# Patient Record
Sex: FEMALE | Race: WHITE | NOT HISPANIC OR LATINO | Employment: OTHER | URBAN - METROPOLITAN AREA
[De-identification: names, ages, dates, MRNs, and addresses within clinical notes are randomized per-mention and may not be internally consistent; named-entity substitution may affect disease eponyms.]

---

## 2019-05-15 ENCOUNTER — HISTORICAL (OUTPATIENT)
Dept: ADMINISTRATIVE | Facility: HOSPITAL | Age: 64
End: 2019-05-15

## 2019-05-15 LAB
ABS NEUT (OLG): 7.54 X10(3)/MCL (ref 2.1–9.2)
ALBUMIN SERPL-MCNC: 3.1 GM/DL (ref 3.4–5)
ALBUMIN/GLOB SERPL: 0.9 RATIO (ref 1.1–2)
ALP SERPL-CCNC: 129 UNIT/L (ref 45–117)
ALT SERPL-CCNC: 19 UNIT/L (ref 12–78)
APPEARANCE, UA: CLEAR
AST SERPL-CCNC: 20 UNIT/L (ref 15–37)
BACTERIA #/AREA URNS AUTO: ABNORMAL /[HPF]
BASOPHILS # BLD AUTO: 0.11 X10(3)/MCL
BASOPHILS NFR BLD AUTO: 1 %
BILIRUB SERPL-MCNC: 0.4 MG/DL (ref 0.2–1)
BILIRUB UR QL STRIP: NEGATIVE
BILIRUBIN DIRECT+TOT PNL SERPL-MCNC: 0.1 MG/DL
BILIRUBIN DIRECT+TOT PNL SERPL-MCNC: 0.3 MG/DL
BUN SERPL-MCNC: 19 MG/DL (ref 7–18)
CALCIUM SERPL-MCNC: 8.9 MG/DL (ref 8.5–10.1)
CHLORIDE SERPL-SCNC: 109 MMOL/L (ref 98–107)
CHOLEST SERPL-MCNC: 148 MG/DL
CHOLEST/HDLC SERPL: 2.6 {RATIO} (ref 0–4.4)
CO2 SERPL-SCNC: 25 MMOL/L (ref 21–32)
COLOR UR: YELLOW
CREAT SERPL-MCNC: 0.8 MG/DL (ref 0.6–1.3)
DEPRECATED CALCIDIOL+CALCIFEROL SERPL-MC: 19.59 NG/ML (ref 30–80)
EOSINOPHIL # BLD AUTO: 0.14 10*3/UL
EOSINOPHIL NFR BLD AUTO: 1 %
ERYTHROCYTE [DISTWIDTH] IN BLOOD BY AUTOMATED COUNT: 13 % (ref 11.5–14.5)
EST. AVERAGE GLUCOSE BLD GHB EST-MCNC: 114 MG/DL
GLOBULIN SER-MCNC: 3.6 GM/ML (ref 2.3–3.5)
GLUCOSE (UA): NORMAL
GLUCOSE SERPL-MCNC: 123 MG/DL (ref 74–106)
HAV IGM SERPL QL IA: NONREACTIVE
HBA1C MFR BLD: 5.6 % (ref 4.2–6.3)
HBV CORE IGM SERPL QL IA: NONREACTIVE
HBV SURFACE AG SERPL QL IA: NEGATIVE
HCT VFR BLD AUTO: 46 % (ref 35–46)
HCV AB SERPL QL IA: NONREACTIVE
HDLC SERPL-MCNC: 58 MG/DL
HGB BLD-MCNC: 14.5 GM/DL (ref 12–16)
HGB UR QL STRIP: NEGATIVE
HIV 1+2 AB+HIV1 P24 AG SERPL QL IA: NONREACTIVE
HYALINE CASTS #/AREA URNS LPF: ABNORMAL /[LPF]
IMM GRANULOCYTES # BLD AUTO: 0.04 10*3/UL
IMM GRANULOCYTES NFR BLD AUTO: 0 %
KETONES UR QL STRIP: NEGATIVE
LDLC SERPL CALC-MCNC: 54 MG/DL (ref 0–130)
LEUKOCYTE ESTERASE UR QL STRIP: NEGATIVE
LYMPHOCYTES # BLD AUTO: 2.56 X10(3)/MCL
LYMPHOCYTES NFR BLD AUTO: 23 % (ref 13–40)
MCH RBC QN AUTO: 29.6 PG (ref 26–34)
MCHC RBC AUTO-ENTMCNC: 31.5 GM/DL (ref 31–37)
MCV RBC AUTO: 93.9 FL (ref 80–100)
MONOCYTES # BLD AUTO: 0.57 X10(3)/MCL
MONOCYTES NFR BLD AUTO: 5 % (ref 4–12)
NEUTROPHILS # BLD AUTO: 7.54 X10(3)/MCL
NEUTROPHILS NFR BLD AUTO: 69 X10(3)/MCL
NITRITE UR QL STRIP: ABNORMAL
PH UR STRIP: 5.5 [PH] (ref 4.5–8)
PLATELET # BLD AUTO: 304 X10(3)/MCL (ref 130–400)
PMV BLD AUTO: 11.5 FL (ref 7.4–10.4)
POTASSIUM SERPL-SCNC: 3.8 MMOL/L (ref 3.5–5.1)
PROT SERPL-MCNC: 6.7 GM/DL (ref 6.4–8.2)
PROT UR QL STRIP: 10 MG/DL
RBC # BLD AUTO: 4.9 X10(6)/MCL (ref 4–5.2)
RBC #/AREA URNS AUTO: ABNORMAL /[HPF]
SODIUM SERPL-SCNC: 140 MMOL/L (ref 136–145)
SP GR UR STRIP: 1.03 (ref 1–1.03)
SQUAMOUS #/AREA URNS LPF: ABNORMAL /[LPF]
TRIGL SERPL-MCNC: 182 MG/DL
TSH SERPL-ACNC: 1.32 MIU/L (ref 0.36–3.74)
UROBILINOGEN UR STRIP-ACNC: NORMAL
VLDLC SERPL CALC-MCNC: 36 MG/DL
WBC # SPEC AUTO: 11 X10(3)/MCL (ref 4.5–11)
WBC #/AREA URNS AUTO: ABNORMAL /HPF

## 2019-05-17 ENCOUNTER — HISTORICAL (OUTPATIENT)
Dept: INTERNAL MEDICINE | Facility: CLINIC | Age: 64
End: 2019-05-17

## 2019-05-23 ENCOUNTER — HISTORICAL (OUTPATIENT)
Dept: RADIOLOGY | Facility: HOSPITAL | Age: 64
End: 2019-05-23

## 2019-05-28 ENCOUNTER — HISTORICAL (OUTPATIENT)
Dept: INTERNAL MEDICINE | Facility: CLINIC | Age: 64
End: 2019-05-28

## 2019-06-03 ENCOUNTER — HISTORICAL (OUTPATIENT)
Dept: ADMINISTRATIVE | Facility: HOSPITAL | Age: 64
End: 2019-06-03

## 2019-06-03 LAB
APPEARANCE, UA: CLEAR
BACTERIA #/AREA URNS AUTO: ABNORMAL /[HPF]
BILIRUB UR QL STRIP: 0.5 MG/DL
COLOR UR: YELLOW
GLUCOSE (UA): NORMAL
HGB UR QL STRIP: NEGATIVE
HYALINE CASTS #/AREA URNS LPF: ABNORMAL /[LPF]
KETONES UR QL STRIP: ABNORMAL
LEUKOCYTE ESTERASE UR QL STRIP: NEGATIVE
NITRITE UR QL STRIP: NEGATIVE
PH UR STRIP: 5.5 [PH] (ref 4.5–8)
PROT UR QL STRIP: 20 MG/DL
RBC #/AREA URNS AUTO: ABNORMAL /[HPF]
SP GR UR STRIP: 1.03 (ref 1–1.03)
SQUAMOUS #/AREA URNS LPF: ABNORMAL /[LPF]
UROBILINOGEN UR STRIP-ACNC: 3 MG/DL
WBC #/AREA URNS AUTO: ABNORMAL /HPF

## 2019-06-05 LAB — FINAL CULTURE: NORMAL

## 2019-06-11 ENCOUNTER — HISTORICAL (OUTPATIENT)
Dept: RESPIRATORY THERAPY | Facility: HOSPITAL | Age: 64
End: 2019-06-11

## 2019-06-20 ENCOUNTER — HISTORICAL (OUTPATIENT)
Dept: RADIOLOGY | Facility: HOSPITAL | Age: 64
End: 2019-06-20

## 2019-06-26 ENCOUNTER — HISTORICAL (OUTPATIENT)
Dept: INTERNAL MEDICINE | Facility: CLINIC | Age: 64
End: 2019-06-26

## 2019-06-26 LAB — CANCER AG19-9 SERPL-ACNC: 18.9 IU/ML (ref 0–35)

## 2019-09-23 ENCOUNTER — HISTORICAL (OUTPATIENT)
Dept: ADMINISTRATIVE | Facility: HOSPITAL | Age: 64
End: 2019-09-23

## 2019-09-23 LAB
APPEARANCE, UA: CLEAR
BACTERIA #/AREA URNS AUTO: ABNORMAL /[HPF]
BILIRUB UR QL STRIP: NEGATIVE
COLOR UR: YELLOW
GLUCOSE (UA): NORMAL
HGB UR QL STRIP: NEGATIVE
HYALINE CASTS #/AREA URNS LPF: ABNORMAL /[LPF]
KETONES UR QL STRIP: NEGATIVE
LEUKOCYTE ESTERASE UR QL STRIP: 75 LEU/UL
NITRITE UR QL STRIP: ABNORMAL
PH UR STRIP: 5.5 [PH] (ref 4.5–8)
PROT UR QL STRIP: NEGATIVE
RBC #/AREA URNS AUTO: ABNORMAL /[HPF]
SP GR UR STRIP: 1.02 (ref 1–1.03)
SQUAMOUS #/AREA URNS LPF: ABNORMAL /[LPF]
UROBILINOGEN UR STRIP-ACNC: NORMAL
WBC #/AREA URNS AUTO: ABNORMAL /HPF

## 2019-10-07 ENCOUNTER — HISTORICAL (OUTPATIENT)
Dept: ENDOSCOPY | Facility: HOSPITAL | Age: 64
End: 2019-10-07

## 2019-11-25 ENCOUNTER — HISTORICAL (OUTPATIENT)
Dept: ADMINISTRATIVE | Facility: HOSPITAL | Age: 64
End: 2019-11-25

## 2019-11-25 LAB
ABS NEUT (OLG): 7.32 X10(3)/MCL (ref 2.1–9.2)
ALBUMIN SERPL-MCNC: 3.2 GM/DL (ref 3.4–5)
ALBUMIN/GLOB SERPL: 0.9 RATIO (ref 1.1–2)
ALP SERPL-CCNC: 109 UNIT/L (ref 45–117)
ALT SERPL-CCNC: 16 UNIT/L (ref 12–78)
APPEARANCE, UA: CLEAR
AST SERPL-CCNC: 14 UNIT/L (ref 15–37)
BACTERIA #/AREA URNS AUTO: ABNORMAL /HPF
BASOPHILS # BLD AUTO: 0.2 X10(3)/MCL (ref 0–0.2)
BASOPHILS NFR BLD AUTO: 1 %
BILIRUB SERPL-MCNC: 0.2 MG/DL (ref 0.2–1)
BILIRUB UR QL STRIP: NEGATIVE
BILIRUBIN DIRECT+TOT PNL SERPL-MCNC: <0.1 MG/DL (ref 0–0.2)
BILIRUBIN DIRECT+TOT PNL SERPL-MCNC: >0.1 MG/DL
BUN SERPL-MCNC: 22 MG/DL (ref 7–18)
CALCIUM SERPL-MCNC: 9.2 MG/DL (ref 8.5–10.1)
CHLORIDE SERPL-SCNC: 114 MMOL/L (ref 98–107)
CO2 SERPL-SCNC: 27 MMOL/L (ref 21–32)
COLOR UR: YELLOW
CREAT SERPL-MCNC: 1 MG/DL (ref 0.6–1.3)
DEPRECATED CALCIDIOL+CALCIFEROL SERPL-MC: 35.22 NG/ML (ref 30–80)
EOSINOPHIL # BLD AUTO: 0.3 X10(3)/MCL (ref 0–0.9)
EOSINOPHIL NFR BLD AUTO: 2 %
ERYTHROCYTE [DISTWIDTH] IN BLOOD BY AUTOMATED COUNT: 16.2 % (ref 11.5–14.5)
GLOBULIN SER-MCNC: 3.6 GM/ML (ref 2.3–3.5)
GLUCOSE (UA): NEGATIVE
GLUCOSE SERPL-MCNC: 82 MG/DL (ref 74–106)
HCT VFR BLD AUTO: 46 % (ref 35–46)
HGB BLD-MCNC: 14.1 GM/DL (ref 12–16)
HGB UR QL STRIP: 0.03 MG/DL
HYALINE CASTS #/AREA URNS LPF: ABNORMAL /LPF
IMM GRANULOCYTES # BLD AUTO: 0.03 10*3/UL
IMM GRANULOCYTES NFR BLD AUTO: 0 %
KETONES UR QL STRIP: NEGATIVE
LEUKOCYTE ESTERASE UR QL STRIP: 500 LEU/UL
LYMPHOCYTES # BLD AUTO: 2.9 X10(3)/MCL (ref 0.6–4.6)
LYMPHOCYTES NFR BLD AUTO: 26 %
MCH RBC QN AUTO: 29.4 PG (ref 26–34)
MCHC RBC AUTO-ENTMCNC: 30.7 GM/DL (ref 31–37)
MCV RBC AUTO: 95.8 FL (ref 80–100)
MONOCYTES # BLD AUTO: 0.7 X10(3)/MCL (ref 0.1–1.3)
MONOCYTES NFR BLD AUTO: 6 %
MUCOUS THREADS URNS QL MICRO: ABNORMAL
NEUTROPHILS # BLD AUTO: 7.32 X10(3)/MCL (ref 2.1–9.2)
NEUTROPHILS NFR BLD AUTO: 64 %
NITRITE UR QL STRIP: NEGATIVE
PH UR STRIP: 5.5 [PH] (ref 4.5–8)
PLATELET # BLD AUTO: 284 X10(3)/MCL (ref 130–400)
PMV BLD AUTO: 11.5 FL (ref 7.4–10.4)
POTASSIUM SERPL-SCNC: 4.3 MMOL/L (ref 3.5–5.1)
PROT SERPL-MCNC: 6.8 GM/DL (ref 6.4–8.2)
PROT UR QL STRIP: 10 MG/DL
RBC # BLD AUTO: 4.8 X10(6)/MCL (ref 4–5.2)
RBC #/AREA URNS AUTO: ABNORMAL /HPF
SODIUM SERPL-SCNC: 146 MMOL/L (ref 136–145)
SP GR UR STRIP: 1.03 (ref 1–1.03)
SQUAMOUS #/AREA URNS LPF: ABNORMAL /LPF
UROBILINOGEN UR STRIP-ACNC: 3 MG/DL
WBC # SPEC AUTO: 11.4 X10(3)/MCL (ref 4.5–11)
WBC #/AREA URNS AUTO: ABNORMAL /HPF

## 2020-05-26 ENCOUNTER — HISTORICAL (OUTPATIENT)
Dept: ADMINISTRATIVE | Facility: HOSPITAL | Age: 65
End: 2020-05-26

## 2020-05-26 LAB
APPEARANCE, UA: CLEAR
BACTERIA #/AREA URNS AUTO: ABNORMAL /HPF
BILIRUB UR QL STRIP: NEGATIVE
COLOR UR: YELLOW
GLUCOSE (UA): NEGATIVE
HGB UR QL STRIP: NEGATIVE
HYALINE CASTS #/AREA URNS LPF: ABNORMAL /LPF
KETONES UR QL STRIP: ABNORMAL
LEUKOCYTE ESTERASE UR QL STRIP: 500 LEU/UL
NITRITE UR QL STRIP: ABNORMAL
PH UR STRIP: 5.5 [PH] (ref 4.5–8)
PROT UR QL STRIP: 30 MG/DL
RBC #/AREA URNS AUTO: ABNORMAL /HPF
SP GR UR STRIP: 1.04 (ref 1–1.03)
SQUAMOUS #/AREA URNS LPF: ABNORMAL /LPF
UROBILINOGEN UR STRIP-ACNC: 3 MG/DL
WBC #/AREA URNS AUTO: ABNORMAL /HPF

## 2020-05-29 ENCOUNTER — HISTORICAL (OUTPATIENT)
Dept: INTERNAL MEDICINE | Facility: CLINIC | Age: 65
End: 2020-05-29

## 2020-05-29 LAB
ABS NEUT (OLG): 9.52 X10(3)/MCL (ref 2.1–9.2)
ALBUMIN SERPL-MCNC: 3.3 GM/DL (ref 3.4–5)
ALBUMIN/GLOB SERPL: 0.8 RATIO (ref 1.1–2)
ALP SERPL-CCNC: 93 UNIT/L (ref 45–117)
ALT SERPL-CCNC: 17 UNIT/L (ref 12–78)
AST SERPL-CCNC: 14 UNIT/L (ref 15–37)
BASOPHILS # BLD AUTO: 0.1 X10(3)/MCL (ref 0–0.2)
BASOPHILS NFR BLD AUTO: 1 %
BILIRUB SERPL-MCNC: 0.2 MG/DL (ref 0.2–1)
BILIRUBIN DIRECT+TOT PNL SERPL-MCNC: <0.1 MG/DL (ref 0–0.2)
BILIRUBIN DIRECT+TOT PNL SERPL-MCNC: ABNORMAL MG/DL
BUN SERPL-MCNC: 17 MG/DL (ref 7–18)
CALCIUM SERPL-MCNC: 9 MG/DL (ref 8.5–10.1)
CHLORIDE SERPL-SCNC: 109 MMOL/L (ref 98–107)
CHOLEST SERPL-MCNC: 176 MG/DL
CHOLEST/HDLC SERPL: 2.6 {RATIO} (ref 0–4.4)
CO2 SERPL-SCNC: 25 MMOL/L (ref 21–32)
CREAT SERPL-MCNC: 1 MG/DL (ref 0.6–1.3)
DEPRECATED CALCIDIOL+CALCIFEROL SERPL-MC: 52.3 NG/ML (ref 30–80)
EOSINOPHIL # BLD AUTO: 0.1 X10(3)/MCL (ref 0–0.9)
EOSINOPHIL NFR BLD AUTO: 1 %
ERYTHROCYTE [DISTWIDTH] IN BLOOD BY AUTOMATED COUNT: 14.7 % (ref 11.5–14.5)
EST. AVERAGE GLUCOSE BLD GHB EST-MCNC: 111 MG/DL
GLOBULIN SER-MCNC: 3.9 GM/ML (ref 2.3–3.5)
GLUCOSE SERPL-MCNC: 86 MG/DL (ref 74–106)
HBA1C MFR BLD: 5.5 % (ref 4.2–6.3)
HCT VFR BLD AUTO: 41.6 % (ref 35–46)
HDLC SERPL-MCNC: 67 MG/DL (ref 40–59)
HGB BLD-MCNC: 12.9 GM/DL (ref 12–16)
IMM GRANULOCYTES # BLD AUTO: 0.05 10*3/UL
IMM GRANULOCYTES NFR BLD AUTO: 0 %
LDLC SERPL CALC-MCNC: 64 MG/DL
LYMPHOCYTES # BLD AUTO: 2.8 X10(3)/MCL (ref 0.6–4.6)
LYMPHOCYTES NFR BLD AUTO: 21 %
MCH RBC QN AUTO: 28 PG (ref 26–34)
MCHC RBC AUTO-ENTMCNC: 31 GM/DL (ref 31–37)
MCV RBC AUTO: 90.4 FL (ref 80–100)
MONOCYTES # BLD AUTO: 0.8 X10(3)/MCL (ref 0.1–1.3)
MONOCYTES NFR BLD AUTO: 6 %
NEUTROPHILS # BLD AUTO: 9.52 X10(3)/MCL (ref 2.1–9.2)
NEUTROPHILS NFR BLD AUTO: 71 %
PLATELET # BLD AUTO: 388 X10(3)/MCL (ref 130–400)
PMV BLD AUTO: 10.4 FL (ref 7.4–10.4)
POTASSIUM SERPL-SCNC: 3.8 MMOL/L (ref 3.5–5.1)
PROT SERPL-MCNC: 7.2 GM/DL (ref 6.4–8.2)
RBC # BLD AUTO: 4.6 X10(6)/MCL (ref 4–5.2)
SODIUM SERPL-SCNC: 141 MMOL/L (ref 136–145)
TRIGL SERPL-MCNC: 224 MG/DL
TSH SERPL-ACNC: 1.87 MIU/L (ref 0.36–3.74)
VLDLC SERPL CALC-MCNC: 45 MG/DL
WBC # SPEC AUTO: 13.4 X10(3)/MCL (ref 4.5–11)

## 2020-06-16 ENCOUNTER — HISTORICAL (OUTPATIENT)
Dept: INFUSION THERAPY | Facility: HOSPITAL | Age: 65
End: 2020-06-16

## 2020-06-30 ENCOUNTER — HISTORICAL (OUTPATIENT)
Dept: LAB | Facility: HOSPITAL | Age: 65
End: 2020-06-30

## 2020-06-30 LAB
APPEARANCE, UA: CLEAR
BACTERIA #/AREA URNS AUTO: ABNORMAL /HPF
BILIRUB UR QL STRIP: NEGATIVE
COLOR UR: YELLOW
GLUCOSE (UA): NEGATIVE
HGB UR QL STRIP: 0.2
HYALINE CASTS #/AREA URNS LPF: ABNORMAL /LPF
KETONES UR QL STRIP: NEGATIVE
LEUKOCYTE ESTERASE UR QL STRIP: 25 LEU/UL
MUCOUS THREADS URNS QL MICRO: ABNORMAL
NITRITE UR QL STRIP: NEGATIVE
PH UR STRIP: 5.5 [PH] (ref 4.5–8)
PROT UR QL STRIP: 20 MG/DL
RBC #/AREA URNS AUTO: ABNORMAL /HPF
SP GR UR STRIP: 1.03 (ref 1–1.03)
SQUAMOUS #/AREA URNS LPF: ABNORMAL /LPF
UROBILINOGEN UR STRIP-ACNC: NORMAL
WBC #/AREA URNS AUTO: ABNORMAL /HPF

## 2020-07-02 LAB — FINAL CULTURE: NO GROWTH

## 2020-09-04 LAB
ABS NEUT (OLG): 6.27 X10(3)/MCL (ref 2.1–9.2)
ALBUMIN SERPL-MCNC: 3.3 GM/DL (ref 3.4–4.8)
ALBUMIN/GLOB SERPL: 1.3 RATIO (ref 1.1–2)
ALP SERPL-CCNC: 73 UNIT/L (ref 40–150)
ALT SERPL-CCNC: 11 UNIT/L (ref 0–55)
AST SERPL-CCNC: 19 UNIT/L (ref 5–34)
BASOPHILS # BLD AUTO: 0.1 X10(3)/MCL (ref 0–0.2)
BASOPHILS NFR BLD AUTO: 1 %
BILIRUB SERPL-MCNC: 0.2 MG/DL
BILIRUBIN DIRECT+TOT PNL SERPL-MCNC: 0.1 MG/DL (ref 0–0.5)
BILIRUBIN DIRECT+TOT PNL SERPL-MCNC: 0.1 MG/DL (ref 0–0.8)
BUN SERPL-MCNC: 12.6 MG/DL (ref 9.8–20.1)
CALCIUM SERPL-MCNC: 8.4 MG/DL (ref 8.4–10.2)
CHLORIDE SERPL-SCNC: 105 MMOL/L (ref 98–107)
CO2 SERPL-SCNC: 23 MMOL/L (ref 23–31)
CREAT SERPL-MCNC: 0.72 MG/DL (ref 0.55–1.02)
EOSINOPHIL # BLD AUTO: 0.1 X10(3)/MCL (ref 0–0.9)
EOSINOPHIL NFR BLD AUTO: 2 %
ERYTHROCYTE [DISTWIDTH] IN BLOOD BY AUTOMATED COUNT: 16.4 % (ref 11.5–17)
GLOBULIN SER-MCNC: 2.6 GM/DL (ref 2.4–3.5)
GLUCOSE SERPL-MCNC: 78 MG/DL (ref 82–115)
HCT VFR BLD AUTO: 40.1 % (ref 37–47)
HGB BLD-MCNC: 11.9 GM/DL (ref 12–16)
INR PPP: 1 (ref 0–1.3)
LYMPHOCYTES # BLD AUTO: 1.7 X10(3)/MCL (ref 0.6–4.6)
LYMPHOCYTES NFR BLD AUTO: 19 %
MCH RBC QN AUTO: 27.4 PG (ref 27–31)
MCHC RBC AUTO-ENTMCNC: 29.7 GM/DL (ref 33–36)
MCV RBC AUTO: 92.4 FL (ref 80–94)
MONOCYTES # BLD AUTO: 0.6 X10(3)/MCL (ref 0.1–1.3)
MONOCYTES NFR BLD AUTO: 7 %
NEUTROPHILS # BLD AUTO: 6.27 X10(3)/MCL (ref 2.1–9.2)
NEUTROPHILS NFR BLD AUTO: 71 %
PLATELET # BLD AUTO: 334 X10(3)/MCL (ref 130–400)
PMV BLD AUTO: 10.6 FL (ref 9.4–12.4)
POTASSIUM SERPL-SCNC: 4.7 MMOL/L (ref 3.5–5.1)
PROT SERPL-MCNC: 5.9 GM/DL (ref 5.8–7.6)
PROTHROMBIN TIME: 12.6 SECOND(S) (ref 11.1–13.7)
RBC # BLD AUTO: 4.34 X10(6)/MCL (ref 4.2–5.4)
SODIUM SERPL-SCNC: 142 MMOL/L (ref 136–145)
WBC # SPEC AUTO: 8.8 X10(3)/MCL (ref 4.5–11.5)

## 2020-09-11 ENCOUNTER — HISTORICAL (OUTPATIENT)
Dept: ADMINISTRATIVE | Facility: HOSPITAL | Age: 65
End: 2020-09-11

## 2020-09-29 ENCOUNTER — HISTORICAL (OUTPATIENT)
Dept: ADMINISTRATIVE | Facility: HOSPITAL | Age: 65
End: 2020-09-29

## 2020-09-29 LAB
FERRITIN SERPL-MCNC: 12.2 NG/ML (ref 8–388)
IRON SATN MFR SERPL: 8.3 % (ref 15–50)
IRON SERPL-MCNC: 34 MCG/DL (ref 50–170)
RET# (OHS): 0.09 X10(6)/MCL (ref 0.02–0.08)
RETICULOCYTE COUNT AUTOMATED (OLG): 1.8 % (ref 0.5–1.5)
TIBC SERPL-MCNC: 409 MCG/DL (ref 250–450)
TRANSFERRIN SERPL-MCNC: 300 MG/DL (ref 200–360)

## 2020-10-05 ENCOUNTER — HISTORICAL (OUTPATIENT)
Dept: RADIOLOGY | Facility: HOSPITAL | Age: 65
End: 2020-10-05

## 2020-10-06 ENCOUNTER — HISTORICAL (OUTPATIENT)
Dept: RADIOLOGY | Facility: HOSPITAL | Age: 65
End: 2020-10-06

## 2020-12-15 ENCOUNTER — HISTORICAL (OUTPATIENT)
Dept: INFUSION THERAPY | Facility: HOSPITAL | Age: 65
End: 2020-12-15

## 2021-04-06 ENCOUNTER — HISTORICAL (OUTPATIENT)
Dept: RADIOLOGY | Facility: HOSPITAL | Age: 66
End: 2021-04-06

## 2021-04-23 ENCOUNTER — HISTORICAL (OUTPATIENT)
Dept: RADIOLOGY | Facility: HOSPITAL | Age: 66
End: 2021-04-23

## 2021-05-03 ENCOUNTER — HISTORICAL (OUTPATIENT)
Dept: ADMINISTRATIVE | Facility: HOSPITAL | Age: 66
End: 2021-05-03

## 2021-05-03 LAB — SARS-COV-2 RNA RESP QL NAA+PROBE: NOT DETECTED

## 2021-05-05 ENCOUNTER — HISTORICAL (OUTPATIENT)
Dept: SURGERY | Facility: HOSPITAL | Age: 66
End: 2021-05-05

## 2021-05-05 LAB
ABS NEUT (OLG): 8.41 X10(3)/MCL (ref 2.1–9.2)
ALBUMIN SERPL-MCNC: 2.9 GM/DL (ref 3.4–4.8)
ALP SERPL-CCNC: 61 UNIT/L (ref 40–150)
ALT SERPL-CCNC: 11 UNIT/L (ref 0–55)
AST SERPL-CCNC: 20 UNIT/L (ref 5–34)
BASOPHILS # BLD AUTO: 0.1 X10(3)/MCL (ref 0–0.2)
BASOPHILS NFR BLD AUTO: 1 %
BILIRUB SERPL-MCNC: 0.2 MG/DL
BILIRUBIN DIRECT+TOT PNL SERPL-MCNC: 0.1 MG/DL (ref 0–0.5)
BILIRUBIN DIRECT+TOT PNL SERPL-MCNC: 0.1 MG/DL (ref 0–0.8)
EOSINOPHIL # BLD AUTO: 0.1 X10(3)/MCL (ref 0–0.9)
EOSINOPHIL NFR BLD AUTO: 1 %
ERYTHROCYTE [DISTWIDTH] IN BLOOD BY AUTOMATED COUNT: 13.9 % (ref 11.5–14.5)
HCT VFR BLD AUTO: 41.6 % (ref 35–46)
HGB BLD-MCNC: 13.3 GM/DL (ref 12–16)
IMM GRANULOCYTES # BLD AUTO: 0.07 10*3/UL
IMM GRANULOCYTES NFR BLD AUTO: 1 %
LYMPHOCYTES # BLD AUTO: 1.4 X10(3)/MCL (ref 0.6–4.6)
LYMPHOCYTES NFR BLD AUTO: 14 %
MCH RBC QN AUTO: 33 PG (ref 26–34)
MCHC RBC AUTO-ENTMCNC: 32 GM/DL (ref 31–37)
MCV RBC AUTO: 103.2 FL (ref 80–100)
MONOCYTES # BLD AUTO: 0.5 X10(3)/MCL (ref 0.1–1.3)
MONOCYTES NFR BLD AUTO: 4 %
NEUTROPHILS # BLD AUTO: 8.41 X10(3)/MCL (ref 2.1–9.2)
NEUTROPHILS NFR BLD AUTO: 80 %
PLATELET # BLD AUTO: 248 X10(3)/MCL (ref 130–400)
PMV BLD AUTO: 9.6 FL (ref 7.4–10.4)
PROT SERPL-MCNC: 5.6 GM/DL (ref 5.8–7.6)
RBC # BLD AUTO: 4.03 X10(6)/MCL (ref 4–5.2)
WBC # SPEC AUTO: 10.6 X10(3)/MCL (ref 4.5–11)

## 2021-06-15 ENCOUNTER — HISTORICAL (OUTPATIENT)
Dept: ADMINISTRATIVE | Facility: HOSPITAL | Age: 66
End: 2021-06-15

## 2021-06-15 ENCOUNTER — HISTORICAL (OUTPATIENT)
Dept: INFUSION THERAPY | Facility: HOSPITAL | Age: 66
End: 2021-06-15

## 2021-06-15 LAB
APPEARANCE, UA: ABNORMAL
BACTERIA #/AREA URNS AUTO: ABNORMAL /HPF
BILIRUB UR QL STRIP: NEGATIVE
COLOR UR: YELLOW
GLUCOSE (UA): NEGATIVE
HGB UR QL STRIP: 0.03 MG/DL
HYALINE CASTS #/AREA URNS LPF: ABNORMAL /LPF
KETONES UR QL STRIP: NEGATIVE
LEUKOCYTE ESTERASE UR QL STRIP: 25 LEU/UL
NITRITE UR QL STRIP: ABNORMAL
PH UR STRIP: 5.5 [PH] (ref 4.5–8)
PROT UR QL STRIP: 30 MG/DL
RBC #/AREA URNS AUTO: ABNORMAL /HPF
SP GR UR STRIP: 1.02 (ref 1–1.03)
SQUAMOUS #/AREA URNS LPF: ABNORMAL /LPF
UROBILINOGEN UR STRIP-ACNC: NORMAL
WBC #/AREA URNS AUTO: ABNORMAL /HPF

## 2021-07-07 ENCOUNTER — HISTORICAL (OUTPATIENT)
Dept: ADMINISTRATIVE | Facility: HOSPITAL | Age: 66
End: 2021-07-07

## 2021-07-07 LAB
ABS NEUT (OLG): 11.75 X10(3)/MCL (ref 2.1–9.2)
ALBUMIN SERPL-MCNC: 2.4 GM/DL (ref 3.4–4.8)
ALBUMIN/GLOB SERPL: 0.7 RATIO (ref 1.1–2)
ALP SERPL-CCNC: 145 UNIT/L (ref 40–150)
ALT SERPL-CCNC: 20 UNIT/L (ref 0–55)
AST SERPL-CCNC: 19 UNIT/L (ref 5–34)
BASOPHILS # BLD AUTO: 0.2 X10(3)/MCL (ref 0–0.2)
BASOPHILS NFR BLD AUTO: 1 %
BILIRUB SERPL-MCNC: 0.1 MG/DL
BILIRUBIN DIRECT+TOT PNL SERPL-MCNC: 0 MG/DL (ref 0–0.8)
BILIRUBIN DIRECT+TOT PNL SERPL-MCNC: 0.1 MG/DL (ref 0–0.5)
BUN SERPL-MCNC: 16.4 MG/DL (ref 9.8–20.1)
CALCIUM SERPL-MCNC: 8.4 MG/DL (ref 8.4–10.2)
CHLORIDE SERPL-SCNC: 113 MMOL/L (ref 98–107)
CO2 SERPL-SCNC: 21 MMOL/L (ref 23–31)
CREAT SERPL-MCNC: 0.86 MG/DL (ref 0.55–1.02)
CREAT UR-MCNC: 81.3 MG/DL (ref 45–106)
EOSINOPHIL # BLD AUTO: 0.1 X10(3)/MCL (ref 0–0.9)
EOSINOPHIL NFR BLD AUTO: 0 %
ERYTHROCYTE [DISTWIDTH] IN BLOOD BY AUTOMATED COUNT: 19 % (ref 11.5–14.5)
ERYTHROCYTE [SEDIMENTATION RATE] IN BLOOD: 3 MM/HR (ref 0–20)
EST. AVERAGE GLUCOSE BLD GHB EST-MCNC: <68.1 MG/DL
GGT SERPL-CCNC: 49 U/L (ref 9–36)
GLOBULIN SER-MCNC: 3.4 GM/DL (ref 2.4–3.5)
GLUCOSE SERPL-MCNC: 83 MG/DL (ref 82–115)
HAPTOGLOB SERPL-MCNC: 208 MG/DL (ref 63–273)
HBA1C MFR BLD: <4 %
HCT VFR BLD AUTO: 37.2 % (ref 35–46)
HGB BLD-MCNC: 11.2 GM/DL (ref 12–16)
IMM GRANULOCYTES # BLD AUTO: 0.08 10*3/UL
IMM GRANULOCYTES NFR BLD AUTO: 0 %
IRON SATN MFR SERPL: 17 % (ref 20–50)
IRON SERPL-MCNC: 29 UG/DL (ref 50–170)
LDH SERPL-CCNC: 273 UNIT/L (ref 140–271)
LYMPHOCYTES # BLD AUTO: 1.8 X10(3)/MCL (ref 0.6–4.6)
LYMPHOCYTES NFR BLD AUTO: 12 %
MCH RBC QN AUTO: 33.9 PG (ref 26–34)
MCHC RBC AUTO-ENTMCNC: 30.1 GM/DL (ref 31–37)
MCV RBC AUTO: 112.7 FL (ref 80–100)
MONOCYTES # BLD AUTO: 0.9 X10(3)/MCL (ref 0.1–1.3)
MONOCYTES NFR BLD AUTO: 6 %
NEUTROPHILS # BLD AUTO: 11.75 X10(3)/MCL (ref 2.1–9.2)
NEUTROPHILS NFR BLD AUTO: 80 %
NRBC BLD AUTO-RTO: 0 % (ref 0–0.2)
PLATELET # BLD AUTO: 355 X10(3)/MCL (ref 130–400)
PMV BLD AUTO: 8.9 FL (ref 7.4–10.4)
POTASSIUM SERPL-SCNC: 4.8 MMOL/L (ref 3.5–5.1)
PROT SERPL-MCNC: 5.8 GM/DL (ref 5.8–7.6)
PROT UR STRIP-MCNC: 19.8 MG/DL
PROT/CREAT UR-RTO: 243.5 MG/GM CR
RBC # BLD AUTO: 3.3 X10(6)/MCL (ref 4–5.2)
SODIUM SERPL-SCNC: 142 MMOL/L (ref 136–145)
TIBC SERPL-MCNC: 145 UG/DL (ref 70–310)
TIBC SERPL-MCNC: 174 UG/DL (ref 250–450)
TRANSFERRIN SERPL-MCNC: 144 MG/DL (ref 173–360)
WBC # SPEC AUTO: 14.8 X10(3)/MCL (ref 4.5–11)

## 2021-07-19 ENCOUNTER — HISTORICAL (OUTPATIENT)
Dept: RADIOLOGY | Facility: HOSPITAL | Age: 66
End: 2021-07-19

## 2021-07-22 ENCOUNTER — HISTORICAL (OUTPATIENT)
Dept: CARDIOLOGY | Facility: HOSPITAL | Age: 66
End: 2021-07-22

## 2021-07-29 ENCOUNTER — HISTORICAL (OUTPATIENT)
Dept: INTERNAL MEDICINE | Facility: CLINIC | Age: 66
End: 2021-07-29

## 2021-07-29 LAB
ABS NEUT (OLG): 11.14 X10(3)/MCL (ref 2.1–9.2)
ALBUMIN % SPEP (OHS): 47.74 % (ref 48.1–59.5)
ALBUMIN SERPL-MCNC: 1.9 GM/DL (ref 3.4–4.8)
ALBUMIN/GLOB SERPL: 0.6 RATIO (ref 1.1–2)
ALPHA 1 GLOB (OHS): 0.29 GM/DL (ref 0–0.4)
ALPHA 1 GLOB% (OHS): 5.71 % (ref 2.3–4.9)
ALPHA 2 GLOB % (OHS): 15.67 % (ref 6.9–13)
ALPHA 2 GLOB (OHS): 0.8 GM/DL (ref 0.4–1)
BASOPHILS # BLD AUTO: 0.2 X10(3)/MCL (ref 0–0.2)
BASOPHILS NFR BLD AUTO: 1 %
BETA GLOB (OHS): 0.73 GM/DL (ref 0.7–1.3)
BETA GLOB% (OHS): 14.24 % (ref 13.8–19.7)
EOSINOPHIL # BLD AUTO: 0.1 X10(3)/MCL (ref 0–0.9)
EOSINOPHIL NFR BLD AUTO: 1 %
ERYTHROCYTE [DISTWIDTH] IN BLOOD BY AUTOMATED COUNT: 15 % (ref 11.5–14.5)
GAMMA GLOBULIN % (OHS): 16.63 % (ref 10.1–21.9)
GAMMA GLOBULIN (OHS): 0.85 GM/DL (ref 0.4–1.8)
GLOBULIN SER-MCNC: 3.2 GM/DL (ref 2.4–3.5)
HCT VFR BLD AUTO: 39.1 % (ref 35–46)
HGB BLD-MCNC: 12.2 GM/DL (ref 12–16)
IFE INTERPRETATION (OHS): ABNORMAL
IGA SERPL-MCNC: 122 MG/DL (ref 69–517)
IGG SERPL-MCNC: 653 MG/DL (ref 552–1632)
IGM SERPL-MCNC: 332 MG/DL (ref 33–293)
IMM GRANULOCYTES # BLD AUTO: 0.09 10*3/UL
IMM GRANULOCYTES NFR BLD AUTO: 1 %
LYMPHOCYTES # BLD AUTO: 2 X10(3)/MCL (ref 0.6–4.6)
LYMPHOCYTES NFR BLD AUTO: 14 %
M SPIKE % (OHS): ABNORMAL
M SPIKE (OHS): ABNORMAL
MCH RBC QN AUTO: 34.1 PG (ref 26–34)
MCHC RBC AUTO-ENTMCNC: 31.2 GM/DL (ref 31–37)
MCV RBC AUTO: 109.2 FL (ref 80–100)
MONOCYTES # BLD AUTO: 0.8 X10(3)/MCL (ref 0.1–1.3)
MONOCYTES NFR BLD AUTO: 6 %
NEUTROPHILS # BLD AUTO: 11.14 X10(3)/MCL (ref 2.1–9.2)
NEUTROPHILS NFR BLD AUTO: 78 %
NRBC BLD AUTO-RTO: 0 % (ref 0–0.2)
PEP GRAPH (OHS): ABNORMAL
PLATELET # BLD AUTO: 378 X10(3)/MCL (ref 130–400)
PMV BLD AUTO: 9.1 FL (ref 7.4–10.4)
PROT SERPL-MCNC: 5.1 GM/DL (ref 5.8–7.6)
RBC # BLD AUTO: 3.58 X10(6)/MCL (ref 4–5.2)
WBC # SPEC AUTO: 14.2 X10(3)/MCL (ref 4.5–11)

## 2021-08-11 ENCOUNTER — HISTORICAL (OUTPATIENT)
Dept: RADIOLOGY | Facility: HOSPITAL | Age: 66
End: 2021-08-11

## 2021-08-20 ENCOUNTER — HISTORICAL (OUTPATIENT)
Dept: ADMINISTRATIVE | Facility: HOSPITAL | Age: 66
End: 2021-08-20

## 2021-08-20 LAB
ABS NEUT (OLG): 9.62 X10(3)/MCL (ref 2.1–9.2)
ALBUMIN SERPL-MCNC: 2.5 GM/DL (ref 3.4–4.8)
ALBUMIN/GLOB SERPL: 0.7 RATIO (ref 1.1–2)
ALP SERPL-CCNC: 103 UNIT/L (ref 40–150)
ALT SERPL-CCNC: 11 UNIT/L (ref 0–55)
AST SERPL-CCNC: 19 UNIT/L (ref 5–34)
BASOPHILS # BLD AUTO: 0.1 X10(3)/MCL (ref 0–0.2)
BASOPHILS NFR BLD AUTO: 1 %
BILIRUB SERPL-MCNC: 0.1 MG/DL
BILIRUBIN DIRECT+TOT PNL SERPL-MCNC: 0 MG/DL (ref 0–0.8)
BILIRUBIN DIRECT+TOT PNL SERPL-MCNC: 0.1 MG/DL (ref 0–0.5)
BUN SERPL-MCNC: 16.5 MG/DL (ref 9.8–20.1)
CALCIUM SERPL-MCNC: 8.4 MG/DL (ref 8.4–10.2)
CHLORIDE SERPL-SCNC: 109 MMOL/L (ref 98–107)
CO2 SERPL-SCNC: 23 MMOL/L (ref 23–31)
CREAT SERPL-MCNC: 0.77 MG/DL (ref 0.55–1.02)
CRP SERPL-MCNC: 0.44 MG/DL
EOSINOPHIL # BLD AUTO: 0 X10(3)/MCL (ref 0–0.9)
EOSINOPHIL NFR BLD AUTO: 0 %
ERYTHROCYTE [DISTWIDTH] IN BLOOD BY AUTOMATED COUNT: 13.4 % (ref 11.5–14.5)
ERYTHROCYTE [SEDIMENTATION RATE] IN BLOOD: 7 MM/HR (ref 0–20)
FERRITIN SERPL-MCNC: 27.92 NG/ML (ref 4.63–204)
GLOBULIN SER-MCNC: 3.4 GM/DL (ref 2.4–3.5)
GLUCOSE SERPL-MCNC: 60 MG/DL (ref 82–115)
HCT VFR BLD AUTO: 40.1 % (ref 35–46)
HGB BLD-MCNC: 12.2 GM/DL (ref 12–16)
IMM GRANULOCYTES # BLD AUTO: 0.13 10*3/UL
IMM GRANULOCYTES NFR BLD AUTO: 1 %
IRON SATN MFR SERPL: 13 % (ref 20–50)
IRON SERPL-MCNC: 32 UG/DL (ref 50–170)
LYMPHOCYTES # BLD AUTO: 1.9 X10(3)/MCL (ref 0.6–4.6)
LYMPHOCYTES NFR BLD AUTO: 16 %
MCH RBC QN AUTO: 32.9 PG (ref 26–34)
MCHC RBC AUTO-ENTMCNC: 30.4 GM/DL (ref 31–37)
MCV RBC AUTO: 108.1 FL (ref 80–100)
MONOCYTES # BLD AUTO: 0.6 X10(3)/MCL (ref 0.1–1.3)
MONOCYTES NFR BLD AUTO: 4 %
NEUTROPHILS # BLD AUTO: 9.62 X10(3)/MCL (ref 2.1–9.2)
NEUTROPHILS NFR BLD AUTO: 78 %
NRBC BLD AUTO-RTO: 0 % (ref 0–0.2)
PLATELET # BLD AUTO: 326 X10(3)/MCL (ref 130–400)
PMV BLD AUTO: 9.7 FL (ref 7.4–10.4)
POTASSIUM SERPL-SCNC: 3.9 MMOL/L (ref 3.5–5.1)
PROT SERPL-MCNC: 5.9 GM/DL (ref 5.8–7.6)
RBC # BLD AUTO: 3.71 X10(6)/MCL (ref 4–5.2)
RET# (OHS): 0.08 X10(6)/MCL (ref 0.02–0.08)
RETICULOCYTE COUNT AUTOMATED (OLG): 2.2 % (ref 0.5–1.5)
SODIUM SERPL-SCNC: 142 MMOL/L (ref 136–145)
TIBC SERPL-MCNC: 206 UG/DL (ref 70–310)
TIBC SERPL-MCNC: 238 UG/DL (ref 250–450)
TRANSFERRIN SERPL-MCNC: 200 MG/DL (ref 173–360)
WBC # SPEC AUTO: 12.4 X10(3)/MCL (ref 4.5–11)

## 2021-09-14 ENCOUNTER — HISTORICAL (OUTPATIENT)
Dept: ENDOSCOPY | Facility: HOSPITAL | Age: 66
End: 2021-09-14

## 2021-09-14 LAB
ERYTHROCYTE [DISTWIDTH] IN BLOOD BY AUTOMATED COUNT: 13.2 % (ref 11.5–14.5)
HCT VFR BLD AUTO: 43.3 % (ref 35–46)
HGB BLD-MCNC: 13.7 GM/DL (ref 12–16)
INR PPP: 0.92 (ref 0.9–1.2)
MCH RBC QN AUTO: 32.5 PG (ref 26–34)
MCHC RBC AUTO-ENTMCNC: 31.6 GM/DL (ref 31–37)
MCV RBC AUTO: 102.6 FL (ref 80–100)
NRBC BLD AUTO-RTO: 0 % (ref 0–0.2)
PLATELET # BLD AUTO: 334 X10(3)/MCL (ref 130–400)
PMV BLD AUTO: 8.7 FL (ref 7.4–10.4)
PROTHROMBIN TIME: 12.1 SECOND(S) (ref 11.9–14.4)
RBC # BLD AUTO: 4.22 X10(6)/MCL (ref 4–5.2)
WBC # SPEC AUTO: 10.4 X10(3)/MCL (ref 4.5–11)

## 2021-11-10 ENCOUNTER — HISTORICAL (OUTPATIENT)
Dept: INTERNAL MEDICINE | Facility: CLINIC | Age: 66
End: 2021-11-10

## 2021-11-10 LAB
ALBUMIN SERPL-MCNC: 2.3 GM/DL (ref 3.4–4.8)
ALBUMIN/GLOB SERPL: 0.6 RATIO (ref 1.1–2)
ALP SERPL-CCNC: 180 UNIT/L (ref 40–150)
ALT SERPL-CCNC: 17 UNIT/L (ref 0–55)
APPEARANCE, UA: CLEAR
AST SERPL-CCNC: 23 UNIT/L (ref 5–34)
BACTERIA #/AREA URNS AUTO: ABNORMAL /HPF
BILIRUB SERPL-MCNC: 0.2 MG/DL
BILIRUB UR QL STRIP: NEGATIVE
BILIRUBIN DIRECT+TOT PNL SERPL-MCNC: 0.1 MG/DL (ref 0–0.5)
BILIRUBIN DIRECT+TOT PNL SERPL-MCNC: 0.1 MG/DL (ref 0–0.8)
BUN SERPL-MCNC: 16 MG/DL (ref 9.8–20.1)
CALCIUM SERPL-MCNC: 8.8 MG/DL (ref 8.7–10.5)
CHLORIDE SERPL-SCNC: 111 MMOL/L (ref 98–107)
CHOLEST SERPL-MCNC: 146 MG/DL
CHOLEST/HDLC SERPL: 2 {RATIO} (ref 0–5)
CO2 SERPL-SCNC: 20 MMOL/L (ref 23–31)
COLOR UR: YELLOW
CREAT SERPL-MCNC: 0.74 MG/DL (ref 0.55–1.02)
GLOBULIN SER-MCNC: 3.8 GM/DL (ref 2.4–3.5)
GLUCOSE (UA): NEGATIVE
GLUCOSE SERPL-MCNC: 72 MG/DL (ref 82–115)
HDLC SERPL-MCNC: 79 MG/DL (ref 35–60)
HGB UR QL STRIP: 0.03 MG/DL
HYALINE CASTS #/AREA URNS LPF: ABNORMAL /LPF
KETONES UR QL STRIP: NEGATIVE
LDLC SERPL CALC-MCNC: 45 MG/DL (ref 50–140)
LEUKOCYTE ESTERASE UR QL STRIP: 250 LEU/UL
NITRITE UR QL STRIP: ABNORMAL
PH UR STRIP: 5.5 [PH] (ref 4.5–8)
POTASSIUM SERPL-SCNC: 4.6 MMOL/L (ref 3.5–5.1)
PROT SERPL-MCNC: 6.1 GM/DL (ref 5.8–7.6)
PROT UR QL STRIP: 10 MG/DL
RBC #/AREA URNS AUTO: ABNORMAL /HPF
SODIUM SERPL-SCNC: 141 MMOL/L (ref 136–145)
SP GR UR STRIP: 1.02 (ref 1–1.03)
SQUAMOUS #/AREA URNS LPF: ABNORMAL /LPF
TRIGL SERPL-MCNC: 108 MG/DL (ref 37–140)
UROBILINOGEN UR STRIP-ACNC: NORMAL
VLDLC SERPL CALC-MCNC: 22 MG/DL
WBC #/AREA URNS AUTO: ABNORMAL /HPF

## 2021-12-08 ENCOUNTER — HISTORICAL (OUTPATIENT)
Dept: RADIOLOGY | Facility: HOSPITAL | Age: 66
End: 2021-12-08

## 2021-12-15 ENCOUNTER — HISTORICAL (OUTPATIENT)
Dept: INFUSION THERAPY | Facility: HOSPITAL | Age: 66
End: 2021-12-15

## 2021-12-15 LAB
ABS NEUT (OLG): 10.73 X10(3)/MCL (ref 2.1–9.2)
ALBUMIN SERPL-MCNC: 2.3 GM/DL (ref 3.4–4.8)
ALBUMIN/GLOB SERPL: 0.7 RATIO (ref 1.1–2)
ALP SERPL-CCNC: 170 UNIT/L (ref 40–150)
ALT SERPL-CCNC: 16 UNIT/L (ref 0–55)
AST SERPL-CCNC: 20 UNIT/L (ref 5–34)
BASOPHILS # BLD AUTO: 0.2 X10(3)/MCL (ref 0–0.2)
BASOPHILS NFR BLD AUTO: 1 %
BILIRUB SERPL-MCNC: 0.2 MG/DL
BILIRUBIN DIRECT+TOT PNL SERPL-MCNC: 0.1 MG/DL (ref 0–0.5)
BILIRUBIN DIRECT+TOT PNL SERPL-MCNC: 0.1 MG/DL (ref 0–0.8)
BUN SERPL-MCNC: 18.3 MG/DL (ref 9.8–20.1)
CALCIUM SERPL-MCNC: 8.6 MG/DL (ref 8.7–10.5)
CHLORIDE SERPL-SCNC: 107 MMOL/L (ref 98–107)
CO2 SERPL-SCNC: 24 MMOL/L (ref 23–31)
CREAT SERPL-MCNC: 0.85 MG/DL (ref 0.55–1.02)
EOSINOPHIL # BLD AUTO: 0 X10(3)/MCL (ref 0–0.9)
EOSINOPHIL NFR BLD AUTO: 0 %
ERYTHROCYTE [DISTWIDTH] IN BLOOD BY AUTOMATED COUNT: 14.7 % (ref 11.5–14.5)
FERRITIN SERPL-MCNC: 31.33 NG/ML (ref 4.63–204)
GLOBULIN SER-MCNC: 3.5 GM/DL (ref 2.4–3.5)
GLUCOSE SERPL-MCNC: 95 MG/DL (ref 82–115)
HCT VFR BLD AUTO: 41 % (ref 35–46)
HGB BLD-MCNC: 13 GM/DL (ref 12–16)
IMM GRANULOCYTES # BLD AUTO: 0.1 10*3/UL
IMM GRANULOCYTES NFR BLD AUTO: 1 %
IRON SATN MFR SERPL: 23 % (ref 20–50)
IRON SERPL-MCNC: 45 UG/DL (ref 50–170)
LYMPHOCYTES # BLD AUTO: 2.6 X10(3)/MCL (ref 0.6–4.6)
LYMPHOCYTES NFR BLD AUTO: 18 %
MCH RBC QN AUTO: 31.9 PG (ref 26–34)
MCHC RBC AUTO-ENTMCNC: 31.7 GM/DL (ref 31–37)
MCV RBC AUTO: 100.5 FL (ref 80–100)
MONOCYTES # BLD AUTO: 0.6 X10(3)/MCL (ref 0.1–1.3)
MONOCYTES NFR BLD AUTO: 4 %
NEUTROPHILS # BLD AUTO: 10.73 X10(3)/MCL (ref 2.1–9.2)
NEUTROPHILS NFR BLD AUTO: 75 %
NRBC BLD AUTO-RTO: 0 % (ref 0–0.2)
PLATELET # BLD AUTO: 420 X10(3)/MCL (ref 130–400)
PMV BLD AUTO: 8.9 FL (ref 7.4–10.4)
POTASSIUM SERPL-SCNC: 4.3 MMOL/L (ref 3.5–5.1)
PROT SERPL-MCNC: 5.8 GM/DL (ref 5.8–7.6)
RBC # BLD AUTO: 4.08 X10(6)/MCL (ref 4–5.2)
SODIUM SERPL-SCNC: 141 MMOL/L (ref 136–145)
TIBC SERPL-MCNC: 154 UG/DL (ref 70–310)
TIBC SERPL-MCNC: 199 UG/DL (ref 250–450)
TRANSFERRIN SERPL-MCNC: 185 MG/DL (ref 173–360)
WBC # SPEC AUTO: 14.3 X10(3)/MCL (ref 4.5–11)

## 2022-04-05 PROBLEM — M85.80 OSTEOPENIA: Status: ACTIVE | Noted: 2022-04-05

## 2022-04-05 PROBLEM — M81.0 OSTEOPOROSIS: Status: ACTIVE | Noted: 2022-04-05

## 2022-04-11 ENCOUNTER — HISTORICAL (OUTPATIENT)
Dept: ADMINISTRATIVE | Facility: HOSPITAL | Age: 67
End: 2022-04-11

## 2022-04-27 VITALS
DIASTOLIC BLOOD PRESSURE: 73 MMHG | HEIGHT: 64 IN | BODY MASS INDEX: 17.5 KG/M2 | OXYGEN SATURATION: 96 % | SYSTOLIC BLOOD PRESSURE: 120 MMHG | WEIGHT: 102.5 LBS

## 2022-04-30 NOTE — OP NOTE
Patient:   Nimo Dill            MRN: [id number]            FIN: [id number]               Age:   65 years     Sex:  Female     :  1955   Associated Diagnoses:   None   Author:   Brandon Esparza MD      Name: Nimo Dill  : [birth year]    Date: 21    Operation performed: Laparoscopic cholecystectomy    Staff surgeon: Dr. Arana    Resident surgeon: Dr. Esparza & Dr. Carcamo    Pre-op diagnosis: symptomatic cholelithiasis    Post-op diagnosis: same    Indication: 65 year old female with multiple abdominal surgeries including Art patch for perforated duodenal ulcer, vagotomy, partial gastrectomy, bypass for duodenal stricture and hysterectomy that presented to surgery clinic with complaints of intermittent RUQ pain exacerbated by fatty foods. US showed stones and sludge. Risks, benefits and alternatives to surgical therapy were discussed and consents obtained.    Anesthesia: General Endotracheal    Technique: Patient was taken to the OR and laid supine. General anesthesia was induced without complications. The abdominal wall was prepped and draped. Time out was held. Abdominal cavity was entered in the supraumbilical position with a Landaverde technique. Insufflation was obtained and diagnostic laparoscopy performed. There were adhesions in upper abdomen making it difficult to locate the liver, stomach and gallbladder. There was an opening in the RUQ allowing us to place a 5 mm trocar under direct visualization. The capsule of the liver was torn with this trocar placement. There was venous bleeding and the decision was made to observe it for the time being. Hot scissors were used to take down filmy adhesions from the epigastric region and around the liver allowing us to place a second 5 mm trochar in the right ramakrishna abdomen. A 12 mm trocar was placed in the subxiphoid position. Both were placed under direct visualization. Omentum and adhesions were taken down from the liver exposing the  "fundus of the gallbladder. This was grasped and lifted cephalad. Further omentum was and adhesions were removed from the body and neck. The neck was splayed laterally and the peritoneum removed exposing the cystic duct. The cystic artery was isolated, clipped and ligated. The duct was doubly clipped and ligated. The gallbladder was dissected from the liver bed with cautery. Hemostasis on the liver bed was obtained. The gallbladder was removed from the subxiphoid trocar site without an Endocatch bag. Reinspection of the liver from the previous trocar injury revealed hemostasis. Blood was suctioned from the left paracolic gutter. Omentum that was taken down from the gallbladder and liver was noted to be hemostatic. Insufflation was evacuated and trocars removed. The subxiphoid and supraumbilical port site fascia was closed with 0-Vicryl. Skin was closed with Monocryl. Final lap and instrument counts were correct x2. Sterile dressings were applied. She was transported to PACU in stable condition. Dr. Arana was present for the entire operation.     EBL: 20 mL    Drains: none    Plan: return to same-day surgery for observation and discharge after recovery from anesthesia. Return to clinic in two weeks for post operative check.     JUVENCIO Esparza MD  [facility], PGY3    This certifies "I was present during the entire period between opening and closing" of the surgical field.      Robin Arana MD        "

## 2022-05-05 NOTE — HISTORICAL [facility] CERNER
This is a historical note converted from [employer]. Formatting and pictures may have been removed.  Please reference [employer] for original formatting and attached multimedia. Patient seen and examined, no changes to H&P completed within the last 30 days.  -To OR today for laparoscopic cholecystectomy  -Informed consent in chart  ?  Shara Carcamo MD  [facility], PGY-1  ?  Pt seen in holding, history reviewed with pt and pt examined.  Pt cautioned that her procedure may be converted to open blanca.  ?  Robin Arana MD  ?

## 2022-05-05 NOTE — HISTORICAL [facility] CERNER
This is a historical note converted from [employer]. Formatting and pictures may have been removed.  Please reference Chidi for original formatting and attached multimedia. Chief Complaint  FOLLOW UP APPOINTMENT  History of Present Illness  This patient is a 63 year old  female here today for follow up to discuss scan and lab results. She has a past medical history of PUD?(with ulcer perforation in the 1980s - NSAID induced), osteoporosis, and cervical cancer (s/p hysterectomy in 1998).?Reports no improvement of dysuria, low back pain or urinary frequency?since?completing second round of Cipro. Urine Culture positive for?10-25k E Coli sensitive to Cipro. Will?repeat today. Discussed CT?Thorax and Abdomen results.?Multiple subcentimeter pulmonary nodules.?She is a current smoker of 1/2 ppd x 1 year - previously smoked 1.5 ppd x 24?years. She is not yet ready to quit. CT Abdomen shows intrahepatic biliary ductal dilatation as well as extrahepatic biliary ductal dilatation with dilatation of the pancreatic duct. Denies illicit drug use or alcohol use.?Reviewed and discussed lab results.?No other complaints today.  Review of Systems  See HPI for details  ?   Constitutional: Denies Change in appetite. Denies Chills. Denies?Fever. Denies Night sweats.  Eye: Denies Blurred vision. Denies Discharge. Denies?Eye pain.  ENT: Denies Decreased hearing. Denies Sore throat. Denies Swollen glands.  Respiratory: Denies Cough. Denies Shortness of breath. Denies Shortness of breath with exertion. Denies Wheezing.  Cardiovascular: Denies Chest pain at rest. Denies Chest pain with exertion. Denies Irregular heartbeat. Denies Palpitations.  Gastrointestinal: Denies Abdominal pain.?Admits Diarrhea. Denies Nausea. Denies Vomiting. Denies Hematemesis or Hematochezia.  Genitourinary: Denies Dysuria. Denies Urinary frequency. Denies Urinary urgency. Denies Blood in urine.  Endocrine: Denies Cold intolerance. Denies Excessive thirst.  Denies Heat intolerance. Denies Weight loss. Denies Weight gain.  Musculoskeletal: Denies Painful joints. Denies Weakness.  Integumentary: Denies Rash. Denies Itching. Denies Dry skin.  Neurologic: Denies Dizziness. Denies Fainting. Denies Headache.  Psychiatric: Denies Depression. Denies Anxiety. Denies Suicidal/Homicidal ideations.  ?   All Other ROS: Negative except as stated in HPI.  Physical Exam  Vitals & Measurements  T:?38? ?C (Oral)? HR:?70(Peripheral)? RR:?18? BP:?126/75?  HT:?163?cm? WT:?57?kg? BMI:?21.45?  General: Alert and oriented, No acute distress.  Head: Normocephalic, Atraumatic.  Eye: Pupils are equal, round and reactive to light, Extraocular movements are intact, Sclera non-icteric.  Ears/Nose/Throat:Normal, Mucosa moist,Clear.  Neck/Thyroid: Supple, Non-tender,No carotid bruit,No palpable thyromegaly or thyroid nodule,?No lymphadenopathy, No JVD, Full range of motion.  Respiratory:Clear to auscultation bilaterally; No wheezes, rales or rhonchi,Non-labored respirations, Symmetrical chest wall expansion.  Cardiovascular:Regular rate and rhythm,S1/S2 normal,No murmurs, rubs or gallops.  Gastrointestinal:Soft,Non-tender,Non-distended,Normal bowel sounds,No palpable organomegaly. Multiple healed post surgical scars /?previous PEG placement?noted.  Musculoskeletal: Normal range of motion.  Integumentary: Warm, Dry, Intact,No suspicious lesions or rashes.  Extremities:No clubbing, cyanosis or edema  Neurologic: No focal deficits, Cranial Nerves II-XII are grossly intact, Motor strength normal upper and lower extremities, Sensory exam intact.  Psychiatric: Normal interaction,Coherent speech,?Euthymic mood,Appropriate affect  Assessment/Plan  1.?Positive FIT (fecal immunochemical test)?R19.5  ?Referred for colonoscopy - appt pending.  ?  2.?Urinary tract infection?N39.0  ?+ Klebsiella in 4/2019 and E Coli in 5/2019 x 2 cultures.  Treated with Cipro x 2.  Repeat UA + C/S  today.  Ordered:  Office/Outpatient Visit Level 4 Established 42762 PC, Urinary tract infection  Splenic lesion  Abnormal liver CT  Pulmonary nodule, [facility], 06/03/19 11:20:00 CDT  Urine Culture 13527, Routine collect, 06/03/19 11:29:00 CDT, Urine, Nurse collect, by CLIENT, Urine, Stop date 06/03/19 11:29:00 CDT, Urinary tract infection  ?  3.?Abnormal liver CT?R93.2  CT Abdomen and Pelvis W Contrast 5/2019 - Intrahepatic biliary ductal dilatation as well as extrahepatic biliary ductal dilatation with dilatation of the pancreatic duct. No evidence for mass. Ampullary mass versus stricture cannot be excluded. 2cm nonenhancing splenic lesion.  MRCP ordered.  Ordered:  Office/Outpatient Visit Level 4 Established 94742 PC, Urinary tract infection  Splenic lesion  Abnormal liver CT  Pulmonary nodule, [facility], 06/03/19 11:20:00 CDT  ?  4.?Pulmonary nodule?R91.1  ?CT Thorax 5/2019 - Stable subcentimeter pulmonary nodules. Emphysema.  CT Abdomen 8/2018 - Multiple subcentimeter bibasilar pulmonary nodules noted.  Repeat CT Thorax in 1 year.  Ordered:  Office/Outpatient Visit Level 4 Established 35556 PC, Urinary tract infection  Splenic lesion  Abnormal liver CT  Pulmonary nodule, [facility], 06/03/19 11:20:00 CDT  ?  5.?Splenic lesion?D73.9  Ordered:  Office/Outpatient Visit Level 4 Established 34162 PC, Urinary tract infection  Splenic lesion  Abnormal liver CT  Pulmonary nodule, [facility], 06/03/19 11:20:00 CDT  ?  6.?Tobacco user?Z72.0  Consider Smoking?Cessation.?Patient is not yet ready to quit.?36 pack year smoking history.  Consider decreasing.  Discussed benefits?including improved health, decreased cardiac/stroke/vascular/pulmonary risks?and saving money.  Will discuss readiness to quit at next?visit.  ?  Follow up as scheduled for 7/2019 and PRN.   Problem List/Past Medical History  Ongoing  Tobacco user  Historical  Cachexia  Smoker  Ulcerative colitis  Procedure/Surgical  History  Colon operation (06/01/2018)  Right hip fracture s/p repair (2013)  hysterectomy (1998)  PEG - Percutaneous endoscopic gastrostomy (01/01/1988)  VAGOTOMY AND PYLOROPLASTY (01/01/1986)  PREFORATED STOMACH ULCER (01/01/1985)   Medications  Carafate 1 g/10 mL oral suspension, 1 gm= 10 mL, Oral, QIDACHS, 5 refills  cholecalciferol 50,000 intl units oral capsule, 17127 IntUnit= 1 cap(s), Oral, qWeek  ciprofloxacin 250 mg oral tablet, 250 mg= 1 tab(s), Oral, BID,? ?Not Taking, Completed Rx  ciprofloxacin 500 mg oral tablet, 500 mg= 1 tab(s), Oral, BID,? ?Not Taking, Completed Rx  Pantoprazole 40 mg ORAL EC-Tablet, 40 mg= 1 tab(s), Oral, Daily, 11 refills  Allergies  penicillins?(blisters)  Social History  Alcohol  Never, 04/24/2019  Employment/School  Unemployed, 05/15/2019  Exercise  Exercise type: Walking., 05/15/2019  Home/Environment  Lives with Alone., 05/15/2019  Nutrition/Health  Regular, 05/15/2019  Substance Abuse  Never, 04/24/2019  Tobacco  10 or more cigarettes (1/2 pack or more)/day in last 30 days, Cigarettes, No, 30 per day., 05/15/2019  Family History  Alcohol abuse: Mother.  Aneurysm: Father.  CAD - Coronary artery disease: Father.  Hypertension.: Father.  Immunizations  Vaccine Date Status   tetanus/diphtheria/pertussis, acel(Tdap) 05/15/2019 Given   Health Maintenance  Health Maintenance  ???Pending?(in the next year)  ??? ??OverDue  ??? ? ? ?Diabetes Screening due??and every?  ??? ? ? ?Smoking Cessation due??01/01/19??Variable frequency  ??? ??Due?  ??? ? ? ?Aspirin Therapy for CVD Prevention due??06/03/19??and every 1??year(s)  ??? ? ? ?Breast Cancer Screening due??06/03/19??and every?  ??? ? ? ?Cervical Cancer Screening due??06/03/19??and every?  ??? ? ? ?Depression Screening due??06/03/19??and every?  ??? ? ? ?Lung Cancer Screening due??06/03/19??and every 1??year(s)  ??? ? ? ?Zoster Vaccine due??06/03/19??and every 100??year(s)  ??? ??Due In Future?  ??? ? ? ?Alcohol Misuse Screening not  due until??01/01/20??and every 1??year(s)  ??? ? ? ?Obesity Screening not due until??01/01/20??and every 1??year(s)  ??? ? ? ?ADL Screening not due until??05/15/20??and every 1??year(s)  ??? ? ? ?Colorectal Screening not due until??05/16/20??and every 1??year(s)  ??? ? ? ?Blood Pressure Screening not due until??06/02/20??and every 1??year(s)  ??? ? ? ?Body Mass Index Check not due until??06/02/20??and every 1??year(s)  ???Satisfied?(in the past 1 year)  ??? ??Satisfied?  ??? ? ? ?ADL Screening on??05/15/19.??Satisfied by rKistie Patrick LPN.  ??? ? ? ?Alcohol Misuse Screening on??05/15/19.??Satisfied by Mesfin Burnette  ??? ? ? ?Blood Pressure Screening on??06/03/19.??Satisfied by Kristie Patrick LPN.  ??? ? ? ?Body Mass Index Check on??06/03/19.??Satisfied by Kristie Patrick LPN.  ??? ? ? ?Colorectal Screening on??05/17/19.??Satisfied by Ro Garrison  ??? ? ? ?Diabetes Screening on??05/15/19.??Satisfied by Atul Shelton Jr.  ??? ? ? ?Lipid Screening on??05/15/19.??Satisfied by Atul Shelton Jr.  ??? ? ? ?Obesity Screening on??06/03/19.??Satisfied by Kristie Patrick LPN.  ??? ? ? ?Tetanus Vaccine on??05/15/19.??Satisfied by Kristie Patrick LPN.  ?  ?
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
## 2022-05-17 DIAGNOSIS — R42 DIZZINESS: Primary | ICD-10-CM

## 2022-06-15 ENCOUNTER — INFUSION (OUTPATIENT)
Dept: INFUSION THERAPY | Facility: HOSPITAL | Age: 67
End: 2022-06-15
Attending: INTERNAL MEDICINE
Payer: MEDICARE

## 2022-06-15 ENCOUNTER — HOSPITAL ENCOUNTER (EMERGENCY)
Facility: HOSPITAL | Age: 67
Discharge: HOME | End: 2022-06-15
Attending: INTERNAL MEDICINE
Payer: MEDICARE

## 2022-06-15 ENCOUNTER — APPOINTMENT (OUTPATIENT)
Dept: HEMATOLOGY/ONCOLOGY | Facility: CLINIC | Age: 67
End: 2022-06-15
Payer: COMMERCIAL

## 2022-06-15 VITALS
BODY MASS INDEX: 17.54 KG/M2 | DIASTOLIC BLOOD PRESSURE: 87 MMHG | OXYGEN SATURATION: 96 % | HEART RATE: 100 BPM | HEIGHT: 64 IN | SYSTOLIC BLOOD PRESSURE: 146 MMHG | TEMPERATURE: 99 F | RESPIRATION RATE: 18 BRPM

## 2022-06-15 VITALS
OXYGEN SATURATION: 99 % | HEIGHT: 64 IN | DIASTOLIC BLOOD PRESSURE: 70 MMHG | SYSTOLIC BLOOD PRESSURE: 128 MMHG | WEIGHT: 102.19 LBS | HEART RATE: 87 BPM | BODY MASS INDEX: 17.45 KG/M2 | RESPIRATION RATE: 20 BRPM

## 2022-06-15 DIAGNOSIS — M85.80 OSTEOPENIA, UNSPECIFIED LOCATION: ICD-10-CM

## 2022-06-15 DIAGNOSIS — R19.7 DIARRHEA: ICD-10-CM

## 2022-06-15 DIAGNOSIS — R21 RASH: Primary | ICD-10-CM

## 2022-06-15 DIAGNOSIS — N76.0 ACUTE VULVOVAGINITIS: ICD-10-CM

## 2022-06-15 DIAGNOSIS — K25.1 ACUTE GASTRIC ULCER WITH PERFORATION: Primary | ICD-10-CM

## 2022-06-15 DIAGNOSIS — M81.0 AGE-RELATED OSTEOPOROSIS WITHOUT CURRENT PATHOLOGICAL FRACTURE: Primary | ICD-10-CM

## 2022-06-15 LAB
ANION GAP SERPL CALC-SCNC: 10 MEQ/L
ANION GAP SERPL CALC-SCNC: 9 MEQ/L
APPEARANCE UR: CLEAR
BACTERIA #/AREA URNS AUTO: ABNORMAL /HPF
BASOPHILS # BLD AUTO: 0.15 X10(3)/MCL (ref 0–0.2)
BASOPHILS NFR BLD AUTO: 0.7 %
BILIRUB UR QL STRIP.AUTO: NEGATIVE MG/DL
BUN SERPL-MCNC: 17 MG/DL (ref 9.8–20.1)
BUN SERPL-MCNC: 17.5 MG/DL (ref 9.8–20.1)
CALCIUM SERPL-MCNC: 8.5 MG/DL (ref 8.4–10.2)
CALCIUM SERPL-MCNC: 8.7 MG/DL (ref 8.4–10.2)
CHLORIDE SERPL-SCNC: 105 MMOL/L (ref 98–107)
CHLORIDE SERPL-SCNC: 108 MMOL/L (ref 98–107)
CO2 SERPL-SCNC: 24 MMOL/L (ref 23–31)
CO2 SERPL-SCNC: 28 MMOL/L (ref 23–31)
COLOR UR AUTO: ABNORMAL
CREAT SERPL-MCNC: 0.76 MG/DL (ref 0.55–1.02)
CREAT SERPL-MCNC: 0.78 MG/DL (ref 0.55–1.02)
CREAT/UREA NIT SERPL: 22
CREAT/UREA NIT SERPL: 23
EOSINOPHIL # BLD AUTO: 0.13 X10(3)/MCL (ref 0–0.9)
EOSINOPHIL NFR BLD AUTO: 0.6 %
ERYTHROCYTE [DISTWIDTH] IN BLOOD BY AUTOMATED COUNT: 16 % (ref 11.5–17)
GLUCOSE SERPL-MCNC: 81 MG/DL (ref 82–115)
GLUCOSE SERPL-MCNC: 87 MG/DL (ref 82–115)
GLUCOSE UR QL STRIP.AUTO: NORMAL MG/DL
HCT VFR BLD AUTO: 35.3 % (ref 37–47)
HGB BLD-MCNC: 10.2 GM/DL (ref 12–16)
HYALINE CASTS #/AREA URNS LPF: ABNORMAL /LPF
IMM GRANULOCYTES # BLD AUTO: 0.23 X10(3)/MCL (ref 0–0.02)
IMM GRANULOCYTES NFR BLD AUTO: 1.1 % (ref 0–0.43)
KETONES UR QL STRIP.AUTO: NEGATIVE MG/DL
LEUKOCYTE ESTERASE UR QL STRIP.AUTO: NEGATIVE UNIT/L
LYMPHOCYTES # BLD AUTO: 2.27 X10(3)/MCL (ref 0.6–4.6)
LYMPHOCYTES NFR BLD AUTO: 11.3 %
MCH RBC QN AUTO: 28.1 PG (ref 27–31)
MCHC RBC AUTO-ENTMCNC: 28.9 MG/DL (ref 33–36)
MCV RBC AUTO: 97.2 FL (ref 80–94)
MONOCYTES # BLD AUTO: 0.91 X10(3)/MCL (ref 0.1–1.3)
MONOCYTES NFR BLD AUTO: 4.5 %
MUCOUS THREADS URNS QL MICRO: ABNORMAL /LPF
NEUTROPHILS # BLD AUTO: 16.4 X10(3)/MCL (ref 2.1–9.2)
NEUTROPHILS NFR BLD AUTO: 81.8 %
NITRITE UR QL STRIP.AUTO: ABNORMAL
NRBC BLD AUTO-RTO: 0 %
PH UR STRIP.AUTO: 5.5 [PH]
PLATELET # BLD AUTO: 426 X10(3)/MCL (ref 130–400)
PMV BLD AUTO: 8.8 FL (ref 9.4–12.4)
POTASSIUM SERPL-SCNC: 4.6 MMOL/L (ref 3.5–5.1)
POTASSIUM SERPL-SCNC: 5 MMOL/L (ref 3.5–5.1)
PROT UR QL STRIP.AUTO: NEGATIVE MG/DL
RBC # BLD AUTO: 3.63 X10(6)/MCL (ref 4.2–5.4)
RBC #/AREA URNS AUTO: ABNORMAL /HPF
RBC UR QL AUTO: NEGATIVE UNIT/L
SODIUM SERPL-SCNC: 142 MMOL/L (ref 136–145)
SODIUM SERPL-SCNC: 142 MMOL/L (ref 136–145)
SP GR UR STRIP.AUTO: 1.02
SQUAMOUS #/AREA URNS LPF: ABNORMAL /HPF
UROBILINOGEN UR STRIP-ACNC: NORMAL MG/DL
WBC # SPEC AUTO: 20.1 X10(3)/MCL (ref 4.5–11.5)
WBC #/AREA URNS AUTO: ABNORMAL /HPF

## 2022-06-15 PROCEDURE — 96372 THER/PROPH/DIAG INJ SC/IM: CPT | Performed by: NURSE PRACTITIONER

## 2022-06-15 PROCEDURE — 96372 THER/PROPH/DIAG INJ SC/IM: CPT | Performed by: PHYSICIAN ASSISTANT

## 2022-06-15 PROCEDURE — 96372 THER/PROPH/DIAG INJ SC/IM: CPT

## 2022-06-15 PROCEDURE — 80048 BASIC METABOLIC PNL TOTAL CA: CPT | Performed by: PHYSICIAN ASSISTANT

## 2022-06-15 PROCEDURE — 36415 COLL VENOUS BLD VENIPUNCTURE: CPT

## 2022-06-15 PROCEDURE — 87077 CULTURE AEROBIC IDENTIFY: CPT | Performed by: PHYSICIAN ASSISTANT

## 2022-06-15 PROCEDURE — 63600175 PHARM REV CODE 636 W HCPCS: Mod: JG | Performed by: NURSE PRACTITIONER

## 2022-06-15 PROCEDURE — 85025 COMPLETE CBC W/AUTO DIFF WBC: CPT | Performed by: PHYSICIAN ASSISTANT

## 2022-06-15 PROCEDURE — 80048 BASIC METABOLIC PNL TOTAL CA: CPT

## 2022-06-15 PROCEDURE — 99285 EMERGENCY DEPT VISIT HI MDM: CPT | Mod: 25

## 2022-06-15 PROCEDURE — 63600175 PHARM REV CODE 636 W HCPCS: Performed by: PHYSICIAN ASSISTANT

## 2022-06-15 PROCEDURE — 25000003 PHARM REV CODE 250: Performed by: PHYSICIAN ASSISTANT

## 2022-06-15 PROCEDURE — 81001 URINALYSIS AUTO W/SCOPE: CPT | Performed by: PHYSICIAN ASSISTANT

## 2022-06-15 RX ORDER — KETOCONAZOLE 20 MG/G
CREAM TOPICAL DAILY
Qty: 60 G | Refills: 0 | Status: ON HOLD | OUTPATIENT
Start: 2022-06-15 | End: 2022-10-28 | Stop reason: HOSPADM

## 2022-06-15 RX ORDER — MORPHINE SULFATE 2 MG/ML
4 INJECTION, SOLUTION INTRAMUSCULAR; INTRAVENOUS
Status: COMPLETED | OUTPATIENT
Start: 2022-06-15 | End: 2022-06-15

## 2022-06-15 RX ORDER — FLUCONAZOLE 200 MG/1
200 TABLET ORAL NIGHTLY
Qty: 3 TABLET | Refills: 0 | Status: SHIPPED | OUTPATIENT
Start: 2022-06-15 | End: 2022-06-23

## 2022-06-15 RX ORDER — HYDROCODONE BITARTRATE AND ACETAMINOPHEN 7.5; 325 MG/1; MG/1
1 TABLET ORAL EVERY 8 HOURS PRN
Qty: 12 TABLET | Refills: 0 | Status: SHIPPED | OUTPATIENT
Start: 2022-06-15 | End: 2022-06-20

## 2022-06-15 RX ORDER — SULFAMETHOXAZOLE AND TRIMETHOPRIM 800; 160 MG/1; MG/1
1 TABLET ORAL 2 TIMES DAILY
Qty: 14 TABLET | Refills: 0 | Status: SHIPPED | OUTPATIENT
Start: 2022-06-15 | End: 2022-06-22

## 2022-06-15 RX ORDER — HYDROCODONE BITARTRATE AND ACETAMINOPHEN 5; 325 MG/1; MG/1
1 TABLET ORAL ONCE
Status: COMPLETED | OUTPATIENT
Start: 2022-06-15 | End: 2022-06-15

## 2022-06-15 RX ORDER — KETOROLAC TROMETHAMINE 30 MG/ML
15 INJECTION, SOLUTION INTRAMUSCULAR; INTRAVENOUS
Status: COMPLETED | OUTPATIENT
Start: 2022-06-15 | End: 2022-06-15

## 2022-06-15 RX ADMIN — MORPHINE SULFATE 4 MG: 2 INJECTION, SOLUTION INTRAMUSCULAR; INTRAVENOUS at 07:06

## 2022-06-15 RX ADMIN — DENOSUMAB 60 MG: 60 INJECTION SUBCUTANEOUS at 02:06

## 2022-06-15 RX ADMIN — KETOROLAC TROMETHAMINE 15 MG: 30 INJECTION, SOLUTION INTRAMUSCULAR at 07:06

## 2022-06-15 RX ADMIN — HYDROCODONE BITARTRATE AND ACETAMINOPHEN 1 TABLET: 5; 325 TABLET ORAL at 05:06

## 2022-06-15 NOTE — ED PROVIDER NOTES
Encounter Date: 6/15/2022       History     Chief Complaint   Patient presents with    Rash     PT REPORTS HAVING PERIODS OF DIARRHEA AS SIDE AFFECT FROM STOMACH SURG 4 YRS AGO.  REPORTS HAVING SEVERE RASH TO VAGINAL AND  BUTTOCKS FROM FREQ. BM.       Patient reports a rash to her groin/buttocks region for the past x3 days; patient reports a chronic history of diarrhea following an abdominal surgery several years ago with occasional bouts of a similar rash that are typically resolved with OTC A&D cream    The history is provided by the patient.   Rash   This is a recurrent problem. The current episode started two days ago. The problem has been unchanged. The rash is present on the genitalia. The pain is at a severity of 3/10. The pain has been constant since onset. Associated symptoms include pain. Treatments tried: A&D cream. The treatment provided no relief.     Review of patient's allergies indicates:   Allergen Reactions    Pcn [penicillins] Hives     Past Medical History:   Diagnosis Date    Common bile duct dilatation     Epigastric abdominal pain     Gastric ulcer, acute with hemorrhage and perforation     PUD (peptic ulcer disease)      Past Surgical History:   Procedure Laterality Date    APPENDECTOMY       SECTION, CLASSIC      CHOLECYSTECTOMY      Distal gastrectomy      GASTRECTOMY      HYSTERECTOMY      Rectovaginal fistula repair      TONSILLECTOMY      VAGOTOMY AND PYLOROPLASTY       Family History   Problem Relation Age of Onset    Stroke Father     Hypertension Father      Social History     Tobacco Use    Smoking status: Current Every Day Smoker     Packs/day: 1.00     Years: 40.00     Pack years: 40.00     Types: Cigarettes    Smokeless tobacco: Never Used   Substance Use Topics    Alcohol use: No    Drug use: No     Review of Systems   Constitutional: Negative for fever.   HENT: Negative for sore throat.    Eyes: Negative.    Respiratory: Negative for shortness of  breath.    Cardiovascular: Negative for chest pain.   Gastrointestinal: Positive for diarrhea. Negative for abdominal pain, blood in stool and nausea.   Genitourinary: Negative for dysuria.   Musculoskeletal: Negative for back pain.   Skin: Positive for rash.   Neurological: Negative for weakness.   Hematological: Does not bruise/bleed easily.   Psychiatric/Behavioral: Negative.        Physical Exam     Initial Vitals [06/15/22 1521]   BP Pulse Resp Temp SpO2   (!) 143/69 96 16 97.9 °F (36.6 °C) 97 %      MAP       --         Physical Exam    Nursing note and vitals reviewed.  Constitutional: She appears well-developed and well-nourished.   HENT:   Head: Normocephalic and atraumatic.   Eyes: EOM are normal.   Neck: Neck supple.   Normal range of motion.  Cardiovascular: Normal rate and regular rhythm.   Pulmonary/Chest: Breath sounds normal.   Abdominal: Abdomen is soft. Bowel sounds are normal.   Musculoskeletal:         General: Normal range of motion.      Cervical back: Normal range of motion and neck supple.     Neurological: She is alert and oriented to person, place, and time.   Skin: Skin is warm and dry. No abrasion, no ecchymosis, no laceration and no abscess noted. There is erythema.        consistent with dermatitis   Psychiatric: She has a normal mood and affect. Her behavior is normal. Judgment and thought content normal.         ED Course   Procedures  Labs Reviewed   BASIC METABOLIC PANEL - Abnormal; Notable for the following components:       Result Value    Glucose Level 81 (*)     All other components within normal limits   URINALYSIS, REFLEX TO URINE CULTURE - Abnormal; Notable for the following components:    Color, UA Light-Yellow (*)     Nitrites, UA 2+ (*)     Bacteria, UA Occ (*)     Squamous Epithelial Cells, UA Trace (*)     Mucous, UA Trace (*)     Hyaline Casts, UA 3-5 (*)     All other components within normal limits   CBC WITH DIFFERENTIAL - Abnormal; Notable for the following  components:    WBC 20.1 (*)     RBC 3.63 (*)     Hgb 10.2 (*)     Hct 35.3 (*)     MCV 97.2 (*)     MCHC 28.9 (*)     Platelet 426 (*)     MPV 8.8 (*)     Neut # 16.4 (*)     IG# 0.23 (*)     IG% 1.1 (*)     All other components within normal limits   CULTURE, URINE   CBC W/ AUTO DIFFERENTIAL    Narrative:     The following orders were created for panel order CBC auto differential.  Procedure                               Abnormality         Status                     ---------                               -----------         ------                     CBC with Differential[[id number]]         Abnormal            Final result                 Please view results for these tests on the individual orders.   EXTRA TUBES    Narrative:     The following orders were created for panel order EXTRA TUBES.  Procedure                               Abnormality         Status                     ---------                               -----------         ------                     Light Blue Top Hold[[id number]]                               In process                 Gold Top Hold[[id number]]                                     In process                   Please view results for these tests on the individual orders.   LIGHT BLUE TOP HOLD   GOLD TOP HOLD          Imaging Results          CT Abdomen Pelvis  Without Contrast (Final result)  Result time 06/15/22 21:23:20    Final result by Link Crane MD (06/15/22 21:23:20)                 Impression:      1. No acute abdominopelvic findings on this noncontrast scan.  2. Irregular bibasilar opacities are new compared to 2021.  Multifocal pneumonia is possible.  Recommend a follow-up chest CT in 3 months to monitor.      Electronically signed by: Link Crane  Date:    06/15/2022  Time:    21:23             Narrative:    EXAMINATION:  CT ABDOMEN PELVIS WITHOUT CONTRAST    CLINICAL HISTORY:  rash to her groin/lower buttocks region for the past x3 days - similar to diaper dermatitis;  patient reports a chronic history of diarrhea that often causes similar symptoms;    TECHNIQUE:  Helical acquisition through the abdomen and pelvis without IV contrast.  Lack of contrast limits evaluation of solid organs and vascular structures .  Three plane reconstructions were provided for review.  mGycm. Automatic exposure control, adjustment of mA/kV or iterative reconstruction technique was used to reduce radiation.    COMPARISON:  6 April 2021    FINDINGS:  There are irregular bibasilar opacities which are new compared to 2021.    No acute findings noncontrast liver, spleen or adrenals.  There is some pancreatic atrophy.  The gallbladder is not clearly seen, question cholecystectomy.  Mild biliary ductal dilatation may relate to cholecystectomy.    There is no hydronephrosis.  There are bilateral renal calculi.  No ureteral calculus is seen.    No bowel obstruction. No significant inflammatory changes of the bowel.  There is colonic diverticulosis.  No free air.  There is no drainable peritoneal collection.  There are postoperative changes of the stomach.    Urinary bladder is unremarkable.  No pelvic free fluid.  Abdominal aorta normal in caliber.  Dense atherosclerotic disease.    There are no acute osseous findings.  There is right hip heidi and screw.  Mild fat stranding is seen along bilateral inferior buttocks but no drainable collection.                               X-Ray Abdomen Flat And Erect (Final result)  Result time 06/15/22 18:14:24    Final result by Dorothea Pollard MD (06/15/22 18:14:24)                 Impression:      Nonspecific bowel gas pattern with paucity of small bowel gas    Calcifications project over the lower pole right kidney.      Electronically signed by: Dorothea Pollard  Date:    06/15/2022  Time:    18:14             Narrative:    EXAMINATION:  XR ABDOMEN FLAT AND ERECT    CLINICAL HISTORY:  Diarrhea, unspecified    TECHNIQUE:  Supine and upright views of the  abdomen performed.    COMPARISON:  CT chest 07/19/2021, abdominal radiograph 04/24/2019    FINDINGS:  No dilated bowel loops seen.  There is a paucity of small bowel gas.  Surgical clips and chain suture is seen within the abdomen.    No evidence of free intraperitoneal air.    There are basilar interstitial opacities.    Partially visible orthopedic hardware at the right hip.    Calcified phleboliths in the pelvis.  Calcifications project over the lower pole of the right kidney.                                 Medications   HYDROcodone-acetaminophen 5-325 mg per tablet 1 tablet (1 tablet Oral Given 6/15/22 1701)   morphine injection 4 mg (4 mg Intramuscular Given 6/15/22 1932)   ketorolac injection 15 mg (15 mg Intramuscular Given 6/15/22 1932)                Attending Attestation:   Physician Attestation Statement for Resident:  As the supervising MD   Physician Attestation Statement: I have personally seen and examined this patient.   I agree with the above history. -:   As the supervising MD I agree with the above PE.   -: Patient has significant rash around her and all opening and lower part of the Vulvovaginal opening, there is excoriation, minimal serous discharge, no major in duration in the area,   As the supervising MD I agree with the above treatment, course, plan, and disposition.   -: We have decided to put patient on medicine for vulvovaginal Candida DSs, also Bactrim for infection, CT scan has been done which is negative for any intra-abdominal or pelvic pathology, she does not have any abscess formation either in the perineum, patient has had these symptoms for a long time because of diarrhea.  I have advised her that she must see a gastroenterologist for follow-up we are giving her referral for further evaluation.  She will be seeing her family doctor.  I will discharge her home.  I have reviewed and agree with the residents interpretation of the following: lab data.                ED Course as of  06/15/22 2141   Wed Nico 15, 2022   1807 Patient reports decreased pain to the rash area [AL]   2109 Patient reports complete resolution of pain [AL]   2119 Transitioned to Dr Yuen [AL]      ED Course User Index  [AL] ARIANA Aquino             Clinical Impression:   Final diagnoses:  [R19.7] Diarrhea  [R21] Rash (Primary)  [N76.0] Acute vulvovaginitis                 Meredith Singh MD  06/15/22 2141

## 2022-06-18 LAB — BACTERIA UR CULT: ABNORMAL

## 2022-10-06 ENCOUNTER — HOSPITAL ENCOUNTER (INPATIENT)
Facility: HOSPITAL | Age: 67
LOS: 5 days | Discharge: HOME | DRG: 871 | End: 2022-10-11
Attending: STUDENT IN AN ORGANIZED HEALTH CARE EDUCATION/TRAINING PROGRAM | Admitting: INTERNAL MEDICINE
Payer: MEDICARE

## 2022-10-06 DIAGNOSIS — K91.89 IRON DEFICIENCY ANEMIA AFTER GASTRECTOMY: Chronic | ICD-10-CM

## 2022-10-06 DIAGNOSIS — M81.0 AGE-RELATED OSTEOPOROSIS WITHOUT CURRENT PATHOLOGICAL FRACTURE: ICD-10-CM

## 2022-10-06 DIAGNOSIS — R55 NEAR SYNCOPE: ICD-10-CM

## 2022-10-06 DIAGNOSIS — D50.8 IRON DEFICIENCY ANEMIA AFTER GASTRECTOMY: Chronic | ICD-10-CM

## 2022-10-06 DIAGNOSIS — R29.6 FALLS FREQUENTLY: Chronic | ICD-10-CM

## 2022-10-06 DIAGNOSIS — J18.9 COMMUNITY ACQUIRED PNEUMONIA, UNSPECIFIED LATERALITY: ICD-10-CM

## 2022-10-06 DIAGNOSIS — R53.1 WEAKNESS: ICD-10-CM

## 2022-10-06 DIAGNOSIS — D72.829 LEUKOCYTOSIS, UNSPECIFIED TYPE: Primary | ICD-10-CM

## 2022-10-06 DIAGNOSIS — M85.80 OSTEOPENIA, UNSPECIFIED LOCATION: ICD-10-CM

## 2022-10-06 DIAGNOSIS — R42 DIZZINESS: ICD-10-CM

## 2022-10-06 DIAGNOSIS — N30.00 ACUTE CYSTITIS WITHOUT HEMATURIA: ICD-10-CM

## 2022-10-06 DIAGNOSIS — I63.9 CVA (CEREBRAL VASCULAR ACCIDENT): ICD-10-CM

## 2022-10-06 DIAGNOSIS — I63.40 CEREBROVASCULAR ACCIDENT (CVA) DUE TO EMBOLISM OF CEREBRAL ARTERY: ICD-10-CM

## 2022-10-06 DIAGNOSIS — D62 ACUTE BLOOD LOSS ANEMIA: ICD-10-CM

## 2022-10-06 DIAGNOSIS — E44.0 MODERATE PROTEIN-CALORIE MALNUTRITION: Chronic | ICD-10-CM

## 2022-10-06 DIAGNOSIS — R79.89 ELEVATED TROPONIN: ICD-10-CM

## 2022-10-06 DIAGNOSIS — I10 PRIMARY HYPERTENSION: ICD-10-CM

## 2022-10-06 DIAGNOSIS — K25.5 CHRONIC GASTRIC ULCER WITH PERFORATION: ICD-10-CM

## 2022-10-06 DIAGNOSIS — F17.200 TOBACCO DEPENDENCE: ICD-10-CM

## 2022-10-06 DIAGNOSIS — R07.9 CHEST PAIN: ICD-10-CM

## 2022-10-06 DIAGNOSIS — R55 SYNCOPE: ICD-10-CM

## 2022-10-06 LAB
ALBUMIN SERPL-MCNC: 2 GM/DL (ref 3.4–4.8)
ALBUMIN/GLOB SERPL: 0.5 RATIO (ref 1.1–2)
ALP SERPL-CCNC: 195 UNIT/L (ref 40–150)
ALT SERPL-CCNC: 26 UNIT/L (ref 0–55)
APPEARANCE UR: ABNORMAL
AST SERPL-CCNC: 20 UNIT/L (ref 5–34)
BACTERIA #/AREA URNS AUTO: ABNORMAL /HPF
BASOPHILS # BLD AUTO: 0.08 X10(3)/MCL (ref 0–0.2)
BASOPHILS NFR BLD AUTO: 0.4 %
BILIRUB UR QL STRIP.AUTO: NEGATIVE MG/DL
BILIRUBIN DIRECT+TOT PNL SERPL-MCNC: 0.8 MG/DL
BUN SERPL-MCNC: 31.5 MG/DL (ref 9.8–20.1)
CALCIUM SERPL-MCNC: 8.5 MG/DL (ref 8.4–10.2)
CHLORIDE SERPL-SCNC: 109 MMOL/L (ref 98–107)
CO2 SERPL-SCNC: 21 MMOL/L (ref 23–31)
COLOR UR AUTO: YELLOW
CREAT SERPL-MCNC: 0.74 MG/DL (ref 0.55–1.02)
EOSINOPHIL # BLD AUTO: 0.06 X10(3)/MCL (ref 0–0.9)
EOSINOPHIL NFR BLD AUTO: 0.3 %
ERYTHROCYTE [DISTWIDTH] IN BLOOD BY AUTOMATED COUNT: 17.9 % (ref 11.5–17)
GFR SERPLBLD CREATININE-BSD FMLA CKD-EPI: >60 MLS/MIN/1.73/M2
GLOBULIN SER-MCNC: 4.3 GM/DL (ref 2.4–3.5)
GLUCOSE SERPL-MCNC: 80 MG/DL (ref 82–115)
GLUCOSE UR QL STRIP.AUTO: NEGATIVE MG/DL
HCT VFR BLD AUTO: 32.5 % (ref 37–47)
HGB BLD-MCNC: 9.7 GM/DL (ref 12–16)
IMM GRANULOCYTES # BLD AUTO: 0.28 X10(3)/MCL (ref 0–0.04)
IMM GRANULOCYTES NFR BLD AUTO: 1.5 %
KETONES UR QL STRIP.AUTO: NEGATIVE MG/DL
LEUKOCYTE ESTERASE UR QL STRIP.AUTO: ABNORMAL UNIT/L
LYMPHOCYTES # BLD AUTO: 0.94 X10(3)/MCL (ref 0.6–4.6)
LYMPHOCYTES NFR BLD AUTO: 5.1 %
MAGNESIUM SERPL-MCNC: 1.7 MG/DL (ref 1.6–2.6)
MCH RBC QN AUTO: 25.2 PG (ref 27–31)
MCHC RBC AUTO-ENTMCNC: 29.8 MG/DL (ref 33–36)
MCV RBC AUTO: 84.4 FL (ref 80–94)
MONOCYTES # BLD AUTO: 0.66 X10(3)/MCL (ref 0.1–1.3)
MONOCYTES NFR BLD AUTO: 3.5 %
NEUTROPHILS # BLD AUTO: 16.6 X10(3)/MCL (ref 2.1–9.2)
NEUTROPHILS NFR BLD AUTO: 89.2 %
NITRITE UR QL STRIP.AUTO: POSITIVE
NRBC BLD AUTO-RTO: 0.1 %
PH UR STRIP.AUTO: 5.5 [PH]
PLATELET # BLD AUTO: 133 X10(3)/MCL (ref 130–400)
PMV BLD AUTO: ABNORMAL FL
POTASSIUM SERPL-SCNC: 4 MMOL/L (ref 3.5–5.1)
PROT SERPL-MCNC: 6.3 GM/DL (ref 5.8–7.6)
PROT UR QL STRIP.AUTO: ABNORMAL MG/DL
RBC # BLD AUTO: 3.85 X10(6)/MCL (ref 4.2–5.4)
RBC #/AREA URNS AUTO: 14 /HPF
RBC UR QL AUTO: ABNORMAL UNIT/L
SODIUM SERPL-SCNC: 139 MMOL/L (ref 136–145)
SP GR UR STRIP.AUTO: 1.02 (ref 1–1.03)
SQUAMOUS #/AREA URNS AUTO: <5 /HPF
TROPONIN I SERPL-MCNC: 0.35 NG/ML (ref 0–0.04)
UROBILINOGEN UR STRIP-ACNC: 1 MG/DL
WBC # SPEC AUTO: 18.6 X10(3)/MCL (ref 4.5–11.5)
WBC #/AREA URNS AUTO: 30 /HPF

## 2022-10-06 PROCEDURE — 93010 ELECTROCARDIOGRAM REPORT: CPT | Mod: ,,, | Performed by: INTERNAL MEDICINE

## 2022-10-06 PROCEDURE — 80053 COMPREHEN METABOLIC PANEL: CPT | Performed by: PHYSICIAN ASSISTANT

## 2022-10-06 PROCEDURE — 85025 COMPLETE CBC W/AUTO DIFF WBC: CPT | Performed by: PHYSICIAN ASSISTANT

## 2022-10-06 PROCEDURE — 87040 BLOOD CULTURE FOR BACTERIA: CPT | Performed by: NURSE PRACTITIONER

## 2022-10-06 PROCEDURE — 25000003 PHARM REV CODE 250: Performed by: PHYSICIAN ASSISTANT

## 2022-10-06 PROCEDURE — 93005 ELECTROCARDIOGRAM TRACING: CPT

## 2022-10-06 PROCEDURE — 87186 SC STD MICRODIL/AGAR DIL: CPT | Performed by: PHYSICIAN ASSISTANT

## 2022-10-06 PROCEDURE — 63600175 PHARM REV CODE 636 W HCPCS: Performed by: NURSE PRACTITIONER

## 2022-10-06 PROCEDURE — 36415 COLL VENOUS BLD VENIPUNCTURE: CPT | Performed by: PHYSICIAN ASSISTANT

## 2022-10-06 PROCEDURE — 81001 URINALYSIS AUTO W/SCOPE: CPT | Performed by: PHYSICIAN ASSISTANT

## 2022-10-06 PROCEDURE — 99285 EMERGENCY DEPT VISIT HI MDM: CPT | Mod: 25

## 2022-10-06 PROCEDURE — 36415 COLL VENOUS BLD VENIPUNCTURE: CPT | Performed by: NURSE PRACTITIONER

## 2022-10-06 PROCEDURE — 84484 ASSAY OF TROPONIN QUANT: CPT | Performed by: PHYSICIAN ASSISTANT

## 2022-10-06 PROCEDURE — 21400001 HC TELEMETRY ROOM

## 2022-10-06 PROCEDURE — 83735 ASSAY OF MAGNESIUM: CPT | Performed by: PHYSICIAN ASSISTANT

## 2022-10-06 PROCEDURE — 11000001 HC ACUTE MED/SURG PRIVATE ROOM

## 2022-10-06 PROCEDURE — 93010 EKG 12-LEAD: ICD-10-PCS | Mod: ,,, | Performed by: INTERNAL MEDICINE

## 2022-10-06 RX ORDER — SODIUM CHLORIDE, SODIUM LACTATE, POTASSIUM CHLORIDE, CALCIUM CHLORIDE 600; 310; 30; 20 MG/100ML; MG/100ML; MG/100ML; MG/100ML
INJECTION, SOLUTION INTRAVENOUS CONTINUOUS
Status: DISCONTINUED | OUTPATIENT
Start: 2022-10-06 | End: 2022-10-08

## 2022-10-06 RX ORDER — ENOXAPARIN SODIUM 100 MG/ML
40 INJECTION SUBCUTANEOUS
Status: COMPLETED | OUTPATIENT
Start: 2022-10-06 | End: 2022-10-06

## 2022-10-06 RX ADMIN — SODIUM CHLORIDE 1000 ML: 9 INJECTION, SOLUTION INTRAVENOUS at 08:10

## 2022-10-06 RX ADMIN — ENOXAPARIN SODIUM 40 MG: 40 INJECTION SUBCUTANEOUS at 10:10

## 2022-10-06 NOTE — Clinical Note
Diagnosis: Leukocytosis, unspecified type [[id number]]   Admitting Provider:: JIMY RODRIGUEZ [[id number]]   Future Attending Provider: JIMY RODRIGUEZ [[id number]]   Reason for IP Medical Treatment  (Clinical interventions that can only be accomplished in the IP setting? ) :: iv   Estimated Length of Stay:: 2 midnights   I certify that Inpatient services for greater than or equal to 2 midnights are medically necessary:: Yes   Plans for Post-Acute care--if anticipated (pick the single best option):: A. No post acute care anticipated at this time   Special Needs:: No Special Needs [1]

## 2022-10-07 PROBLEM — I63.9 STROKE: Status: ACTIVE | Noted: 2022-10-07

## 2022-10-07 LAB
ANION GAP SERPL CALC-SCNC: 9 MEQ/L
APTT PPP: 31.1 SECONDS (ref 23.2–33.7)
AV INDEX (PROSTH): 0.53
AV MEAN GRADIENT: 7 MMHG
AV PEAK GRADIENT: 13 MMHG
AV VALVE AREA: 1.67 CM2
AV VELOCITY RATIO: 0.56
BASOPHILS # BLD AUTO: 0.03 X10(3)/MCL (ref 0–0.2)
BASOPHILS NFR BLD AUTO: 0.2 %
BSA FOR ECHO PROCEDURE: 1.42 M2
BUN SERPL-MCNC: 23.2 MG/DL (ref 9.8–20.1)
CALCIUM SERPL-MCNC: 8.3 MG/DL (ref 8.4–10.2)
CHLORIDE SERPL-SCNC: 111 MMOL/L (ref 98–107)
CO2 SERPL-SCNC: 20 MMOL/L (ref 23–31)
CREAT SERPL-MCNC: 0.78 MG/DL (ref 0.55–1.02)
CREAT/UREA NIT SERPL: 30
CRP SERPL-MCNC: 208.9 MG/L
CV ECHO LV RWT: 0.42 CM
D DIMER PPP IA.FEU-MCNC: 2.59 UG/ML FEU (ref 0–0.5)
DOP CALC AO PEAK VEL: 1.79 M/S
DOP CALC AO VTI: 28.6 CM
DOP CALC LVOT AREA: 3.1 CM2
DOP CALC LVOT DIAMETER: 2 CM
DOP CALC LVOT PEAK VEL: 1 M/S
DOP CALC LVOT STROKE VOLUME: 47.73 CM3
DOP CALC MV VTI: 25 CM
DOP CALCLVOT PEAK VEL VTI: 15.2 CM
E WAVE DECELERATION TIME: 156 MSEC
E/A RATIO: 0.77
ECHO LV POSTERIOR WALL: 0.9 CM (ref 0.6–1.1)
EJECTION FRACTION: 57 %
EOSINOPHIL # BLD AUTO: 0.03 X10(3)/MCL (ref 0–0.9)
EOSINOPHIL NFR BLD AUTO: 0.2 %
ERYTHROCYTE [DISTWIDTH] IN BLOOD BY AUTOMATED COUNT: 17.8 % (ref 11.5–17)
ERYTHROCYTE [SEDIMENTATION RATE] IN BLOOD: 73 MM/HR (ref 0–20)
EST. AVERAGE GLUCOSE BLD GHB EST-MCNC: 122.6 MG/DL
FERRITIN SERPL-MCNC: 184.35 NG/ML (ref 4.63–204)
FLUAV AG UPPER RESP QL IA.RAPID: NOT DETECTED
FLUBV AG UPPER RESP QL IA.RAPID: NOT DETECTED
FOLATE SERPL-MCNC: 8.9 NG/ML (ref 7–31.4)
FRACTIONAL SHORTENING: 37 % (ref 28–44)
GFR SERPLBLD CREATININE-BSD FMLA CKD-EPI: >60 MLS/MIN/1.73/M2
GLUCOSE SERPL-MCNC: 62 MG/DL (ref 82–115)
GROUP & RH: NORMAL
HBA1C MFR BLD: 5.9 %
HCT VFR BLD AUTO: 24.5 % (ref 37–47)
HCT VFR BLD AUTO: 28.1 % (ref 37–47)
HGB BLD-MCNC: 7.4 GM/DL (ref 12–16)
HGB BLD-MCNC: 8.3 GM/DL (ref 12–16)
IMM GRANULOCYTES # BLD AUTO: 0.17 X10(3)/MCL (ref 0–0.04)
IMM GRANULOCYTES NFR BLD AUTO: 1.1 %
INDIRECT COOMBS GEL: NORMAL
INTERVENTRICULAR SEPTUM: 0.78 CM (ref 0.6–1.1)
IRON SATN MFR SERPL: 6 % (ref 20–50)
IRON SERPL-MCNC: 10 UG/DL (ref 50–170)
LACTATE SERPL-SCNC: 1.6 MMOL/L (ref 0.5–2.2)
LACTATE SERPL-SCNC: 2.2 MMOL/L (ref 0.5–2.2)
LEFT ATRIUM SIZE: 2.9 CM
LEFT ATRIUM VOLUME INDEX MOD: 19.4 ML/M2
LEFT ATRIUM VOLUME MOD: 28.1 CM3
LEFT CCA DIST DIAS: 11 CM/S
LEFT CCA DIST SYS: 79 CM/S
LEFT CCA PROX DIAS: 13 CM/S
LEFT CCA PROX SYS: 80 CM/S
LEFT ECA DIAS: 15 CM/S
LEFT ECA SYS: 80 CM/S
LEFT ICA DIST DIAS: 29 CM/S
LEFT ICA DIST SYS: 77 CM/S
LEFT ICA MID DIAS: 56 CM/S
LEFT ICA MID SYS: 93 CM/S
LEFT ICA PROX DIAS: 12 CM/S
LEFT ICA PROX SYS: 62 CM/S
LEFT INTERNAL DIMENSION IN SYSTOLE: 2.69 CM (ref 2.1–4)
LEFT VENTRICLE DIASTOLIC VOLUME INDEX: 56.34 ML/M2
LEFT VENTRICLE DIASTOLIC VOLUME: 81.7 ML
LEFT VENTRICLE MASS INDEX: 77 G/M2
LEFT VENTRICLE SYSTOLIC VOLUME INDEX: 18.5 ML/M2
LEFT VENTRICLE SYSTOLIC VOLUME: 26.8 ML
LEFT VENTRICULAR INTERNAL DIMENSION IN DIASTOLE: 4.27 CM (ref 3.5–6)
LEFT VENTRICULAR MASS: 111.08 G
LEFT VERTEBRAL DIAS: 17 CM/S
LEFT VERTEBRAL SYS: 100 CM/S
LVOT MG: 2 MMHG
LVOT MV: 0.67 CM/S
LYMPHOCYTES # BLD AUTO: 0.76 X10(3)/MCL (ref 0.6–4.6)
LYMPHOCYTES NFR BLD AUTO: 5 %
MAGNESIUM SERPL-MCNC: 1.7 MG/DL (ref 1.6–2.6)
MCH RBC QN AUTO: 24.8 PG (ref 27–31)
MCHC RBC AUTO-ENTMCNC: 29.5 MG/DL (ref 33–36)
MCV RBC AUTO: 83.9 FL (ref 80–94)
MONOCYTES # BLD AUTO: 0.59 X10(3)/MCL (ref 0.1–1.3)
MONOCYTES NFR BLD AUTO: 3.9 %
MR PISA EROA: 0.27 CM2
MV MEAN GRADIENT: 3 MMHG
MV PEAK A VEL: 1.14 M/S
MV PEAK E VEL: 0.88 M/S
MV PEAK GRADIENT: 5 MMHG
MV STENOSIS PRESSURE HALF TIME: 39 MS
MV VALVE AREA BY CONTINUITY EQUATION: 1.91 CM2
MV VALVE AREA P 1/2 METHOD: 5.64 CM2
NEUTROPHILS # BLD AUTO: 13.5 X10(3)/MCL (ref 2.1–9.2)
NEUTROPHILS NFR BLD AUTO: 89.6 %
NRBC BLD AUTO-RTO: 0.1 %
OHS CV CAROTID RIGHT ICA EDV HIGHEST: 15
OHS CV CAROTID ULTRASOUND LEFT ICA/CCA RATIO: 1.18
OHS CV CAROTID ULTRASOUND RIGHT ICA/CCA RATIO: 0.91
OHS CV PV CAROTID LEFT HIGHEST CCA: 80
OHS CV PV CAROTID LEFT HIGHEST ICA: 93
OHS CV PV CAROTID RIGHT HIGHEST CCA: 73
OHS CV PV CAROTID RIGHT HIGHEST ICA: 61
OHS CV US CAROTID LEFT HIGHEST EDV: 56
PHOSPHATE SERPL-MCNC: 2.9 MG/DL (ref 2.3–4.7)
PISA MRMAX VEL: 5.7 M/S
PISA RADIUS: 0.8 CM
PISA TR MAX VEL: 2.83 M/S
PISA VN NYQUIST MS: 0.39 M/S
PISA VN NYQUIST: 0.39 M/S
PLATELET # BLD AUTO: 142 X10(3)/MCL (ref 130–400)
PMV BLD AUTO: 11.8 FL (ref 7.4–10.4)
POTASSIUM SERPL-SCNC: 3.3 MMOL/L (ref 3.5–5.1)
PV PEAK VELOCITY: 0.87 CM/S
RA PRESSURE: 3 MMHG
RBC # BLD AUTO: 3.35 X10(6)/MCL (ref 4.2–5.4)
RIGHT CCA DIST DIAS: 16 CM/S
RIGHT CCA DIST SYS: 67 CM/S
RIGHT CCA PROX DIAS: 16 CM/S
RIGHT CCA PROX SYS: 73 CM/S
RIGHT ECA DIAS: 7 CM/S
RIGHT ECA SYS: 90 CM/S
RIGHT ICA DIST DIAS: 13 CM/S
RIGHT ICA DIST SYS: 61 CM/S
RIGHT ICA MID DIAS: 15 CM/S
RIGHT ICA MID SYS: 58 CM/S
RIGHT ICA PROX DIAS: 12 CM/S
RIGHT ICA PROX SYS: 46 CM/S
RIGHT VERTEBRAL DIAS: 14 CM/S
RIGHT VERTEBRAL SYS: 75 CM/S
SARS-COV-2 RNA RESP QL NAA+PROBE: NOT DETECTED
SODIUM SERPL-SCNC: 140 MMOL/L (ref 136–145)
TIBC SERPL-MCNC: 168 UG/DL (ref 70–310)
TIBC SERPL-MCNC: 178 UG/DL (ref 250–450)
TR MAX PG: 32 MMHG
TRANSFERRIN SERPL-MCNC: 158 MG/DL (ref 173–360)
TRICUSPID ANNULAR PLANE SYSTOLIC EXCURSION: 2.17 CM
TROPONIN I SERPL-MCNC: 0.36 NG/ML (ref 0–0.04)
TROPONIN I SERPL-MCNC: 0.37 NG/ML (ref 0–0.04)
TROPONIN I SERPL-MCNC: 0.39 NG/ML (ref 0–0.04)
TSH SERPL-ACNC: 3.24 UIU/ML (ref 0.35–4.94)
TV REST PULMONARY ARTERY PRESSURE: 35 MMHG
VIT B12 SERPL-MCNC: >2000 PG/ML (ref 213–816)
WBC # SPEC AUTO: 15.1 X10(3)/MCL (ref 4.5–11.5)

## 2022-10-07 PROCEDURE — 82607 VITAMIN B-12: CPT | Performed by: NURSE PRACTITIONER

## 2022-10-07 PROCEDURE — 85379 FIBRIN DEGRADATION QUANT: CPT | Performed by: NURSE PRACTITIONER

## 2022-10-07 PROCEDURE — 80061 LIPID PANEL: CPT | Performed by: NURSE PRACTITIONER

## 2022-10-07 PROCEDURE — 97162 PT EVAL MOD COMPLEX 30 MIN: CPT

## 2022-10-07 PROCEDURE — 99223 PR INITIAL HOSPITAL CARE,LEVL III: ICD-10-PCS | Mod: ,,, | Performed by: PSYCHIATRY & NEUROLOGY

## 2022-10-07 PROCEDURE — 83540 ASSAY OF IRON: CPT | Performed by: NURSE PRACTITIONER

## 2022-10-07 PROCEDURE — 84100 ASSAY OF PHOSPHORUS: CPT | Performed by: NURSE PRACTITIONER

## 2022-10-07 PROCEDURE — 25000003 PHARM REV CODE 250: Performed by: NURSE PRACTITIONER

## 2022-10-07 PROCEDURE — 85730 THROMBOPLASTIN TIME PARTIAL: CPT | Performed by: INTERNAL MEDICINE

## 2022-10-07 PROCEDURE — 36415 COLL VENOUS BLD VENIPUNCTURE: CPT | Performed by: NURSE PRACTITIONER

## 2022-10-07 PROCEDURE — 97166 OT EVAL MOD COMPLEX 45 MIN: CPT

## 2022-10-07 PROCEDURE — 25500020 PHARM REV CODE 255: Performed by: INTERNAL MEDICINE

## 2022-10-07 PROCEDURE — 85025 COMPLETE CBC W/AUTO DIFF WBC: CPT | Performed by: NURSE PRACTITIONER

## 2022-10-07 PROCEDURE — S4991 NICOTINE PATCH NONLEGEND: HCPCS | Performed by: NURSE PRACTITIONER

## 2022-10-07 PROCEDURE — 25000003 PHARM REV CODE 250: Performed by: INTERNAL MEDICINE

## 2022-10-07 PROCEDURE — 87798 DETECT AGENT NOS DNA AMP: CPT | Performed by: NURSE PRACTITIONER

## 2022-10-07 PROCEDURE — 86140 C-REACTIVE PROTEIN: CPT | Performed by: NURSE PRACTITIONER

## 2022-10-07 PROCEDURE — C9113 INJ PANTOPRAZOLE SODIUM, VIA: HCPCS | Performed by: NURSE PRACTITIONER

## 2022-10-07 PROCEDURE — 85651 RBC SED RATE NONAUTOMATED: CPT | Performed by: NURSE PRACTITIONER

## 2022-10-07 PROCEDURE — 84484 ASSAY OF TROPONIN QUANT: CPT | Performed by: NURSE PRACTITIONER

## 2022-10-07 PROCEDURE — 83605 ASSAY OF LACTIC ACID: CPT | Performed by: NURSE PRACTITIONER

## 2022-10-07 PROCEDURE — 83735 ASSAY OF MAGNESIUM: CPT | Performed by: NURSE PRACTITIONER

## 2022-10-07 PROCEDURE — 0241U COVID/FLU A&B PCR: CPT | Performed by: NURSE PRACTITIONER

## 2022-10-07 PROCEDURE — 85014 HEMATOCRIT: CPT | Performed by: INTERNAL MEDICINE

## 2022-10-07 PROCEDURE — 82746 ASSAY OF FOLIC ACID SERUM: CPT | Performed by: NURSE PRACTITIONER

## 2022-10-07 PROCEDURE — 86850 RBC ANTIBODY SCREEN: CPT | Performed by: INTERNAL MEDICINE

## 2022-10-07 PROCEDURE — 21400001 HC TELEMETRY ROOM

## 2022-10-07 PROCEDURE — 99223 1ST HOSP IP/OBS HIGH 75: CPT | Mod: ,,, | Performed by: PSYCHIATRY & NEUROLOGY

## 2022-10-07 PROCEDURE — 82728 ASSAY OF FERRITIN: CPT | Performed by: NURSE PRACTITIONER

## 2022-10-07 PROCEDURE — 63600175 PHARM REV CODE 636 W HCPCS: Performed by: INTERNAL MEDICINE

## 2022-10-07 PROCEDURE — 83036 HEMOGLOBIN GLYCOSYLATED A1C: CPT | Performed by: NURSE PRACTITIONER

## 2022-10-07 PROCEDURE — 84443 ASSAY THYROID STIM HORMONE: CPT | Performed by: NURSE PRACTITIONER

## 2022-10-07 PROCEDURE — 80048 BASIC METABOLIC PNL TOTAL CA: CPT | Performed by: NURSE PRACTITIONER

## 2022-10-07 PROCEDURE — 63600175 PHARM REV CODE 636 W HCPCS: Performed by: NURSE PRACTITIONER

## 2022-10-07 RX ORDER — PROCHLORPERAZINE EDISYLATE 5 MG/ML
5 INJECTION INTRAMUSCULAR; INTRAVENOUS EVERY 6 HOURS PRN
Status: DISCONTINUED | OUTPATIENT
Start: 2022-10-07 | End: 2022-10-11 | Stop reason: HOSPADM

## 2022-10-07 RX ORDER — ATORVASTATIN CALCIUM 40 MG/1
40 TABLET, FILM COATED ORAL DAILY
Status: DISCONTINUED | OUTPATIENT
Start: 2022-10-07 | End: 2022-10-11 | Stop reason: HOSPADM

## 2022-10-07 RX ORDER — AMOXICILLIN 250 MG
1 CAPSULE ORAL 2 TIMES DAILY PRN
Status: DISCONTINUED | OUTPATIENT
Start: 2022-10-07 | End: 2022-10-11 | Stop reason: HOSPADM

## 2022-10-07 RX ORDER — PANTOPRAZOLE SODIUM 40 MG/10ML
80 INJECTION, POWDER, LYOPHILIZED, FOR SOLUTION INTRAVENOUS ONCE
Status: COMPLETED | OUTPATIENT
Start: 2022-10-07 | End: 2022-10-07

## 2022-10-07 RX ORDER — ONDANSETRON 2 MG/ML
4 INJECTION INTRAMUSCULAR; INTRAVENOUS EVERY 4 HOURS PRN
Status: DISCONTINUED | OUTPATIENT
Start: 2022-10-07 | End: 2022-10-11 | Stop reason: HOSPADM

## 2022-10-07 RX ORDER — ASPIRIN 325 MG
325 TABLET ORAL DAILY
Status: DISCONTINUED | OUTPATIENT
Start: 2022-10-07 | End: 2022-10-08

## 2022-10-07 RX ORDER — PANTOPRAZOLE SODIUM 40 MG/10ML
40 INJECTION, POWDER, LYOPHILIZED, FOR SOLUTION INTRAVENOUS 2 TIMES DAILY
Status: DISCONTINUED | OUTPATIENT
Start: 2022-10-07 | End: 2022-10-10

## 2022-10-07 RX ORDER — POLYETHYLENE GLYCOL 3350 17 G/17G
17 POWDER, FOR SOLUTION ORAL 2 TIMES DAILY PRN
Status: DISCONTINUED | OUTPATIENT
Start: 2022-10-07 | End: 2022-10-11 | Stop reason: HOSPADM

## 2022-10-07 RX ORDER — ACETAMINOPHEN 500 MG
1000 TABLET ORAL EVERY 6 HOURS PRN
Status: DISCONTINUED | OUTPATIENT
Start: 2022-10-07 | End: 2022-10-11 | Stop reason: HOSPADM

## 2022-10-07 RX ORDER — TRAMADOL HYDROCHLORIDE 50 MG/1
50 TABLET ORAL EVERY 6 HOURS PRN
Status: DISCONTINUED | OUTPATIENT
Start: 2022-10-07 | End: 2022-10-11 | Stop reason: HOSPADM

## 2022-10-07 RX ORDER — ACETAMINOPHEN 325 MG/1
650 TABLET ORAL EVERY 4 HOURS PRN
Status: DISCONTINUED | OUTPATIENT
Start: 2022-10-07 | End: 2022-10-11 | Stop reason: HOSPADM

## 2022-10-07 RX ORDER — SODIUM CHLORIDE 0.9 % (FLUSH) 0.9 %
10 SYRINGE (ML) INJECTION
Status: DISCONTINUED | OUTPATIENT
Start: 2022-10-07 | End: 2022-10-11 | Stop reason: HOSPADM

## 2022-10-07 RX ORDER — TALC
6 POWDER (GRAM) TOPICAL NIGHTLY PRN
Status: DISCONTINUED | OUTPATIENT
Start: 2022-10-07 | End: 2022-10-11 | Stop reason: HOSPADM

## 2022-10-07 RX ORDER — NYSTATIN AND TRIAMCINOLONE ACETONIDE 100000; 1 [USP'U]/G; MG/G
CREAM TOPICAL 3 TIMES DAILY
Status: DISCONTINUED | OUTPATIENT
Start: 2022-10-07 | End: 2022-10-11 | Stop reason: HOSPADM

## 2022-10-07 RX ORDER — MAG HYDROX/ALUMINUM HYD/SIMETH 200-200-20
30 SUSPENSION, ORAL (FINAL DOSE FORM) ORAL 4 TIMES DAILY PRN
Status: DISCONTINUED | OUTPATIENT
Start: 2022-10-07 | End: 2022-10-11 | Stop reason: HOSPADM

## 2022-10-07 RX ORDER — IBUPROFEN 200 MG
1 TABLET ORAL DAILY
Status: DISCONTINUED | OUTPATIENT
Start: 2022-10-07 | End: 2022-10-11 | Stop reason: HOSPADM

## 2022-10-07 RX ORDER — IPRATROPIUM BROMIDE AND ALBUTEROL SULFATE 2.5; .5 MG/3ML; MG/3ML
3 SOLUTION RESPIRATORY (INHALATION) EVERY 4 HOURS PRN
Status: DISCONTINUED | OUTPATIENT
Start: 2022-10-07 | End: 2022-10-11 | Stop reason: HOSPADM

## 2022-10-07 RX ORDER — SIMETHICONE 80 MG
1 TABLET,CHEWABLE ORAL 4 TIMES DAILY PRN
Status: DISCONTINUED | OUTPATIENT
Start: 2022-10-07 | End: 2022-10-11 | Stop reason: HOSPADM

## 2022-10-07 RX ADMIN — PANTOPRAZOLE SODIUM 80 MG: 40 INJECTION, POWDER, FOR SOLUTION INTRAVENOUS at 06:10

## 2022-10-07 RX ADMIN — CEFTRIAXONE SODIUM 1 G: 1 INJECTION, POWDER, FOR SOLUTION INTRAMUSCULAR; INTRAVENOUS at 02:10

## 2022-10-07 RX ADMIN — PANTOPRAZOLE SODIUM 40 MG: 40 INJECTION, POWDER, FOR SOLUTION INTRAVENOUS at 08:10

## 2022-10-07 RX ADMIN — POTASSIUM BICARBONATE 50 MEQ: 977.5 TABLET, EFFERVESCENT ORAL at 02:10

## 2022-10-07 RX ADMIN — SODIUM CHLORIDE, POTASSIUM CHLORIDE, SODIUM LACTATE AND CALCIUM CHLORIDE: 600; 310; 30; 20 INJECTION, SOLUTION INTRAVENOUS at 08:10

## 2022-10-07 RX ADMIN — AZITHROMYCIN MONOHYDRATE 500 MG: 500 INJECTION, POWDER, LYOPHILIZED, FOR SOLUTION INTRAVENOUS at 11:10

## 2022-10-07 RX ADMIN — SODIUM CHLORIDE 125 MG: 9 INJECTION, SOLUTION INTRAVENOUS at 10:10

## 2022-10-07 RX ADMIN — IOPAMIDOL 60 ML: 755 INJECTION, SOLUTION INTRAVENOUS at 05:10

## 2022-10-07 RX ADMIN — SODIUM CHLORIDE, POTASSIUM CHLORIDE, SODIUM LACTATE AND CALCIUM CHLORIDE: 600; 310; 30; 20 INJECTION, SOLUTION INTRAVENOUS at 10:10

## 2022-10-07 RX ADMIN — TRAMADOL HYDROCHLORIDE 50 MG: 50 TABLET, COATED ORAL at 08:10

## 2022-10-07 RX ADMIN — ZINC OXIDE: 400 OINTMENT TOPICAL at 08:10

## 2022-10-07 RX ADMIN — NYSTATIN AND TRIAMCINOLONE ACETONIDE: 100000; 1 CREAM TOPICAL at 09:10

## 2022-10-07 RX ADMIN — NICOTINE 1 PATCH: 14 PATCH TRANSDERMAL at 10:10

## 2022-10-07 RX ADMIN — NYSTATIN AND TRIAMCINOLONE ACETONIDE: 100000; 1 CREAM TOPICAL at 03:10

## 2022-10-07 RX ADMIN — SODIUM CHLORIDE, POTASSIUM CHLORIDE, SODIUM LACTATE AND CALCIUM CHLORIDE: 600; 310; 30; 20 INJECTION, SOLUTION INTRAVENOUS at 12:10

## 2022-10-07 NOTE — ED NOTES
Pt. C/O dizziness and generalized weakness becoming worse over the past 2 weeks with multiple falls. Reports being seen at [facility] on 10/4 for same complaint and had an MRI. Pt was D/C home but did not F/U with neurologist. Pt currently AAOx4. Neurovascular status intact. Bruising noted to bilateral upper and lower arms. Pt reports bruising is from multiple falls.

## 2022-10-07 NOTE — PLAN OF CARE
Problem: Physical Therapy  Goal: Physical Therapy Goal  Description: Goals to be met by: 2022    Patient will increase functional independence with mobility by performin. Supine to sit with Contact Guard Assistance  2. Sit to stand transfer with Contact Guard Assistance  3. Gait  x 200 feet with Contact Guard Assistance using Rolling Walker.     Outcome: Ongoing, Progressing

## 2022-10-07 NOTE — PT/OT/SLP EVAL
"Occupational Therapy   Evaluation    Name: Nimo Dill  MRN: [id number]  Admitting Diagnosis:  <principal problem not specified>  Recent Surgery: * No surgery found *      Recommendations:     Discharge Recommendations: rehabilitation facility, nursing facility, skilled  Discharge Equipment Recommendations:  walker, rolling, bedside commode  Barriers to discharge:       Assessment:     Nimo Dill is a 66 y.o. female with a medical Hx of CVAs who presented to [facility] 10/06 with dizziness, weakness and decreased appetite. Pt pt/family, pt was fully independent until ~3 weeks ago and since then has had multiple falls during the weeks where she reports she both loses balance and "passes out". She does not present with any focal weakness but with significant generalized weakness and deconditioning and decreased balance. She presents with the following performance deficits affecting function: weakness, impaired endurance, impaired self care skills, impaired functional mobility, gait instability, pain, decreased safety awareness.      Rehab Prognosis: Good; patient would benefit from acute skilled OT services to address these deficits and reach maximum level of function.       Plan:     Patient to be seen 5 x/week to address the above listed problems via self-care/home management, therapeutic activities, therapeutic exercises  Plan of Care Expires: 11/11/22  Plan of Care Reviewed with: patient, spouse    Subjective     Chief Complaint: Weakness, Fatigue  Patient/Family Comments/goals: Get stronger in order to return to ADLs.     Occupational Profile:  Living Environment: Lives at home with , ramp with rails to enter, shoulder/tub combo,   Previous level of function: Recently progressed to using rollator to ambulate at all times around the house  - 2 weeks ago pt was able to drive and go to the grocery store.   Roles and Routines: Unstated  Equipment Used at Home:  rollator, shower chair, grab bar  Assistance " upon Discharge:  states he will be able to assist pt as needed     Pain/Comfort:  Location 1:  (pt reports of body aches without number)  Pain Addressed 1: Distraction, Reposition    Patients cultural, spiritual, Scientologist conflicts given the current situation: no    Objective:     Communicated with: poonam prior to session.  Patient found HOB elevated with telemetry, peripheral IV upon OT entry to room.    General Precautions: Standard, fall   Orthopedic Precautions:    Braces:    Respiratory Status: Room air    Occupational Performance:    Bed Mobility:    Patient completed Supine to Sit with moderate assistance    Functional Mobility/Transfers:  Patient completed Sit <> Stand Transfer with moderate assistance  with  rolling walker  @ EOB  Pt able to take 1 step to R in standing @ EOB with Mod A and RW before needing to sit 2/2 weakness     Cognitive/Visual Perceptual:  F > F : WFL  F > N : WFL     Physical Exam:  B UE WFL    Treatment & Education:  Pt and  educated on POC and safety.     Patient left HOB elevated with all lines intact, call button in reach, and  present    GOALS:   Multidisciplinary Problems       Occupational Therapy Goals          Problem: Occupational Therapy    Goal Priority Disciplines Outcome Interventions   Occupational Therapy Goal     OT, PT/OT Ongoing, Progressing    Description: Goals to be met by: 11/11/22     Patient will increase functional independence with ADLs by performing:    UE Dressing with Modified [place].  LE Dressing with Modified [place].  Grooming while seated with Modified [place].  Toileting from bedside commode with Modified [place] for hygiene and clothing management.   Toilet transfer to bedside commode with Modified [place].                         History:     Past Medical History:   Diagnosis Date    Common bile duct dilatation     Epigastric abdominal pain     Gastric ulcer, acute with hemorrhage and perforation      PUD (peptic ulcer disease)          Past Surgical History:   Procedure Laterality Date    APPENDECTOMY       SECTION, CLASSIC      CHOLECYSTECTOMY      Distal gastrectomy      GASTRECTOMY      HYSTERECTOMY      Rectovaginal fistula repair      TONSILLECTOMY      VAGOTOMY AND PYLOROPLASTY         Time Tracking:     OT Date of Treatment:    OT Start Time: 1327  OT Stop Time: 1351  OT Total Time (min): 24 min    Billable Minutes:Evaluation Mod Complexity - 24    10/7/2022

## 2022-10-07 NOTE — CONSULTS
"Nimo Dill   MRN: [id number]   ADMISSION DATE: 10/6/2022  : 1955  AGE: 66 y.o.    DATE :  10/07/2022       PROVIDER: CARLY MARIE    REASON FOR REFERRAL  Anemia, dark stools    Mrs. Dill is a 67 yo female with pmhx tobacco use, PUD/gastric perforation s/p gastrectomy in 2018, recurrent falls ongoing for the past year and 60 lbs unintentional weight loss. She is not a good historian. She underwent an MRI Brain w/o on 22 due to recurrent falls with results showing old cerebellar infarcts and an old right centrum semi oval infarct with chronic age related volume loss and chronic microvascular disease. She states "my  couldn't take it anymore so he sent me here". She states she has been falling more frequently over the past 2 weeks, also reports subjective fever, cough, dysuria, and intermittent episodes of chest pain however there is none present today.  She has urinary incontinence and wears a diaper.      Initial ED VS: Temperature 97.9°, heart rate 103, respirations 20, /71, oxygenation 97% room air.  CT head negative for acute intracranial finding.  Labs remarkable for leukocytosis of 18,600 with a left shift, hemoglobin 9.7, hematocrit 32.5, troponin 0.347 . EKG BRENNA with non-specific ST changes, right atrial enlargement..  UA positive for UTI., BUN 31.5. Pt is being admitted to . [facility] consulted, will follow.      Patient seen and examined this evening, she is a poor historian.  She denies history of CAD/AMI.  She reports dark tarry stools for the last 4 weeks or so.  No NSAID use, hx of PUD with gastric ulcer perf in the past.  She reports multiple recurrent falls over the past year however they have worsened over the past few weeks and does state that at times she loses consciousness with her falls She has never had routine colonoscopy screening. I found MRI results after I saw the patient so I was not able to tell her that she had old strokes.    SUBJECTIVE:  Patient has complained " of generalized weakness.  History of recurrent falls.  History of lightheadedness and dizziness.  Occasional shortness of breath.  No significant chest pain at this time.  History of dark stool reported in the last 4 weeks or so.  No hematochezia.  No hematemesis.  No significant nausea vomiting.  No fever or chills.      Review of patient's allergies indicates:   Allergen Reactions    Penicillins Hives and Blisters         aspirin  325 mg Oral Daily    atorvastatin  40 mg Oral Daily    azithromycin (ZITHROMAX) 500 mg IVPB  500 mg Intravenous Q24H    cefTRIAXone (ROCEPHIN) IVPB  1 g Intravenous Q24H    ferric gluconate (FERRLECIT) IVPB  125 mg Intravenous Daily    multivitamin  1 tablet Oral Daily    nicotine  1 patch Transdermal Daily    nystatin-triamcinolone   Topical (Top) TID    pantoprazole  40 mg Intravenous BID    potassium bicarbonate  50 mEq Oral Once       There are no discontinued medications.      lactated ringers 125 mL/hr at 10/07/22 1058        Past Medical History:   Diagnosis Date    Common bile duct dilatation     Epigastric abdominal pain     Gastric ulcer, acute with hemorrhage and perforation     PUD (peptic ulcer disease)       Social History     Socioeconomic History    Marital status:    Tobacco Use    Smoking status: Every Day     Packs/day: 1.00     Years: 40.00     Pack years: 40.00     Types: Cigarettes    Smokeless tobacco: Never   Substance and Sexual Activity    Alcohol use: No    Drug use: No    Sexual activity: Yes     Partners: Male      Past Surgical History:   Procedure Laterality Date    APPENDECTOMY       SECTION, CLASSIC      CHOLECYSTECTOMY      Distal gastrectomy      GASTRECTOMY      HYSTERECTOMY      Rectovaginal fistula repair      TONSILLECTOMY      VAGOTOMY AND PYLOROPLASTY          Family History   Problem Relation Age of Onset    Stroke Father     Hypertension Father       OBJECTIVE:     Vitals:    10/07/22 0258 10/07/22 0700 10/07/22 1100 10/07/22 1530    BP: 128/71 130/81 (!) 144/82 129/78   Pulse: 103 105 96 96   Resp: 20 20 19    Temp: 97.9 °F (36.6 °C) 99 °F (37.2 °C) 98.6 °F (37 °C) 98.3 °F (36.8 °C)   TempSrc: Oral   Oral   SpO2:  95% 96%    Weight:       Height:           Physical Exam   HEENT examination:  Pale conjunctiva anicteric sclera   Neck:  Supple,   Chest:  Decreased breath sounds bilaterally.  Occasional bibasilar crackles.    Heart examination:  S1, S2 audible  Abdomen:  Bowel sounds positive.  Soft.  Nontender.  Extremities:  No significant edema.  She does have diffuse ecchymoses in bilateral upper and lower extremities which according to patient has been there for some time.      LABS    Recent Labs   Lab 10/06/22  1841 10/07/22  1026   WBC 18.6* 15.1*   HGB 9.7* 8.3*   HCT 32.5* 28.1*    142      Recent Labs   Lab 10/06/22  1841 10/07/22  0715    140   K 4.0 3.3*   CO2 21* 20*   BUN 31.5* 23.2*   CREATININE 0.74 0.78   CALCIUM 8.5 8.3*   BILITOT 0.8  --    ALKPHOS 195*  --    ALT 26  --    AST 20  --    GLUCOSE 80* 62*    No results for input(s): INR in the last 168 hours. No results for input(s): AMYLASE in the last 168 hours. No results for input(s): APTT, INR, PTT in the last 168 hours.        RESULTS: X-Ray Chest PA And Lateral    Result Date: 10/7/2022  EXAMINATION: XR CHEST PA AND LATERAL CLINICAL HISTORY: Weakness COMPARISON: 07/07/2021 FINDINGS: PA and lateral views of the chest show patchy consolidation in the right greater than left lung bases.  No pneumothorax or large effusion.  Aorta is partially calcified.  Cardiac silhouette and pulmonary vasculature are normal.  No acute osseous findings.     Right greater than left lung consolidation compatible with atypical infection Electronically signed by: Arnoldo Grant MD Date:    10/07/2022 Time:    06:15    CT Head Without Contrast    Result Date: 10/6/2022  EXAMINATION: CT HEAD WITHOUT CONTRAST CLINICAL HISTORY: Dizziness for 2 weeks TECHNIQUE: Axial CT images were  obtained through the head without contrast.  Coronal and sagittal reformations were also performed.  Total .  Automated exposure control utilized. COMPARISON: None. FINDINGS: No hemorrhage, mass effect or CT evidence of acute cortical infarction.  White-matter hypodensities are nonspecific but likely related to small vessel ischemic disease.  Chronic lacunar infarcts are in the right basal ganglia and right cerebellum.  Ventricles and sulci are proportionate. No abnormal extra-axial fluid collections. No hyperdense artery or vein. No skull fracture or aggressive osseous process.  Visualized paranasal sinuses and mastoid air cells are clear.     No acute intracranial findings. Electronically signed by: Arnoldo Grant MD Date:    10/06/2022 Time:    19:32    CTA Chest Non-Coronary (PE Studies)    Result Date: 10/7/2022  EXAMINATION: CTA CHEST NON CORONARY (PE STUDIES) CLINICAL HISTORY: Chest pain TECHNIQUE: Axial CT images were obtained through the chest after IV administration of 60 cc contrast.  MIP, coronal and sagittal reconstructions submitted and interpreted.  Total .  Automated exposure control utilized. COMPARISON: Chest x-ray 10/06/2022 FINDINGS: No filling defects are in the main pulmonary artery or segmental branches. Patchy opacities are in the lower lungs bilaterally with areas of bronchiectasis and pleural base reticular opacities.  Small effusions are present.  No pneumothorax. Heart is normal size.  Thoracic aorta is normal in caliber.  Central airways are clear. No enlarged lymph nodes. No acute osseous findings. Visualized portions of the upper abdomen are normal.     1. No CT evidence of pulmonary thromboembolism. 2. Patchy opacities in both lower lungs suggestive of atypical infection. 3. Small effusions. 4.  Findings are compatible with the preliminary report. Electronically signed by: Arnoldo Grant MD Date:    10/07/2022 Time:    07:19    MRI Brain Without Contrast    Result Date:  10/7/2022  EXAMINATION: MRI BRAIN WITHOUT CONTRAST CLINICAL HISTORY: Transient ischemic attack (TIA);Stroke, follow up; TECHNIQUE: Multiplanar MRI sequences were performed of the brain without contrast. COMPARISON: CT brain without contrast October 6, 2022 FINDINGS: There are several acute nonhemorrhagic lacunar infarcts which involve bilateral cerebellar hemispheres, right aspect of the [place]ust[place], left occipital lobe, right basal ganglia and bilateral frontoparietal lobes.  These infarcts show diffusion weighted restriction with signal dropout on the ADC map and T2 FLAIR hyperintensity.  There is no significant mass effect or midline shift.  There also old lacunar infarcts which involve bilateral cerebellar hemispheres and the right corona radiata.  No hydrocephalus.  Chronic microvascular ischemic changes are mild to moderate with periventricular and deep white matter T2 FLAIR hyperintense signals.  Generalized cerebral cortical volume loss. Gradient echo sequences demonstrate no evidence of de phasing artifact to suggest hemorrhagic byproducts. The cerebellar tonsils are normally positioned.  No acute extra axial fluid collections identified. The mastoid air cells are clear.     1.  Several acute pontine and infratentorial supratentorial nonhemorrhagic lacunar infarct. 2.  Old lacunar infarcts, chronic microangiopathic ischemia and atrophy. . Electronically signed by: Yousif Chiu Date:    10/07/2022 Time:    07:17    CV Ultrasound Bilateral Doppler Carotid    Result Date: 10/7/2022  The right internal carotid artery demonstrated no hemodynamically significant stenosis. The left internal carotid artery demonstrated no hemodynamically significant stenosis. Bilateral vertebral arteries were patent with antegrade flow.          ICD-10-CM ICD-9-CM   1. Leukocytosis, unspecified type  D72.829 288.60   2. Dizziness  R42 780.4   3. Weakness  R53.1 780.79   4. Elevated troponin  R77.8 790.6   5. Near syncope  R55 780.2   6.  "Syncope  R55 780.2   7. CVA (cerebral vascular accident)  I63.9 434.91   8. Chest pain  R07.9 786.50        ASSESSMENT & PLAN:     Mrs. Dill is a 67 yo female with pmhx tobacco use, PUD/gastric perforation s/p gastrectomy in 2018, recurrent falls ongoing for the past year and 60 lbs unintentional weight loss. She is not a good historian. She underwent an MRI Brain w/o on 9/13/22 due to recurrent falls with results showing old cerebellar infarcts and an old right centrum semi oval infarct with chronic age related volume loss and chronic microvascular disease. She states "my  couldn't take it anymore so he sent me here". She states she has been falling more frequently over the past 2 weeks, also reports subjective fever, cough, dysuria, and intermittent episodes of chest pain however there is none present today.  She has urinary incontinence and wears a diaper.      Initial ED VS: Temperature 97.9°, heart rate 103, respirations 20, /71, oxygenation 97% room air.  CT head negative for acute intracranial finding.  Labs remarkable for leukocytosis of 18,600 with a left shift, hemoglobin 9.7, hematocrit 32.5, troponin 0.347 . EKG BRENNA with non-specific ST changes, right atrial enlargement..  UA positive for UTI., BUN 31.5. Pt is being admitted to . [facility] consulted, will follow.      Patient seen and examined this evening, she is a poor historian.  She denies history of CAD/AMI.  She reports dark tarry stools for the last 4 weeks or so.  No NSAID use, hx of PUD with gastric ulcer perf in the past.  She reports multiple recurrent falls over the past year however they have worsened over the past few weeks and does state that at times she loses consciousness with her falls She has never had routine colonoscopy screening. I found MRI results after I saw the patient so I was not able to tell her that she had old strokes.    Recommendations:  1. Clear liquid diet for now.    2. Intravenous proton pump inhibitors.  "   3. Serial hemoglobin/hematocrit.  Transfuse packed RBC if hemoglobin less than 8.    4. Neurology workup in progress.    5. Avoid anticoagulants or NSAIDs.  6. Patient is also noted to have elevated troponins.  Recommend Cardiology consultation as well.  7. Patient would likely need upper endoscopy during this hospitalization.  This can be done over the weekend or possibly Monday pending course in hospital.  Patient will need cardiology as well as neurology clearance for upper endoscopy.     Thank for the consultation, will follow.  Discussed above with the patient and family in details.  Also discussed with the nursing staff.    Patient was seen earlier by Cardiology Service.  Cardiology consult noted.

## 2022-10-07 NOTE — CONSULTS
[facility] - 6th Floor Medical Telemetry  Cardiology  Consult Note    Patient Name: Nimo Dill  MRN: [id number]  Admission Date: 10/6/2022  Hospital Length of Stay: 1 days  Code Status: Full Code   Attending Provider: Timothy Shipley MD   Consulting Provider: Alla Pillai MD  Primary Care Physician: NELI Hodges  Principal Problem:<principal problem not specified>    Patient information was obtained from patient, relative(s), past medical records, and ER records.     Subjective:     Chief Complaint:  Consult for NSTEMI     HPI: Nimo Dill is a 66 year old female, unknown to [facility] with a PMH significant for PUD w/ hx of gastric perforation s/p gastrectomy 2018, tobacco use, AoCD, Hx of CVAs who presented to [facility] 10/06 with dizziness, weakness and decreased appetite. Of note MRI 9/13/2022 revealed old cerebellum infarcts. In the ED VS mostly stable with the exception of tachycardia to 105. Notable labs, WBC 18.6 with left shift, H/H 9.7/32.5, Troponin 0.347. EKG revealed NSR with right atrial enlargement and nonspecific ST changes. UA consistent with UTI. CT head negative for acute intracranial findings. MRI revealed severe acute pontine and and infratentorial supratentorial nonhemorrhagic lacunar infarct. Old lacunar infarcts, chronic microangiopathic ischemia and atrophy. Patient is admitted to hospital service. Carotid US showed no significant stenosis. [facility] consulted for NSTEMI.    Patient seen and evaluated at bedside this morning. She denies CP. Reports intermittent palpitations, SOB with minimal exertion. She denies history of cardiac disease, has never been evaluated by a cardiologists.       PMH: CVAs, PUD w/ hx of gastric perforation s/p gastrectomy 2018, tobacco use, AoCD  PSH: Appendectomy, CS, Cholecystectomy, Gastrectomy, Hysterectomy, Rectovaginal fistula repair, Tonsillectomy  Family History:   Social History: smokes 1/2PPD with >30pack year; no etoh/illicit drug  use    Previous Cardiac Diagnostics:     Preliminary Echo 10.07.22  The estimated ejection fraction is 55-60%.  Normal systolic function.  Normal left ventricular diastolic function.  Moderate-to-severe mitral regurgitation.  Mild tricuspid regurgitation.  Normal central venous pressure (3 mmHg).  The estimated PA systolic pressure is 35 mmHg.    Echo 22  Left ventricular ejectin fraction i measured at approximately 60%  E/A flow reversal noted. Suggestive of diastolic dysfunction        Past Medical History:   Diagnosis Date    Common bile duct dilatation     Epigastric abdominal pain     Gastric ulcer, acute with hemorrhage and perforation     PUD (peptic ulcer disease)        Past Surgical History:   Procedure Laterality Date    APPENDECTOMY       SECTION, CLASSIC      CHOLECYSTECTOMY      Distal gastrectomy      GASTRECTOMY      HYSTERECTOMY      Rectovaginal fistula repair      TONSILLECTOMY      VAGOTOMY AND PYLOROPLASTY         Review of patient's allergies indicates:   Allergen Reactions    Penicillins Hives and Blisters       No current facility-administered medications on file prior to encounter.     Current Outpatient Medications on File Prior to Encounter   Medication Sig    alprazolam (XANAX) 0.5 MG tablet Take 0.5 mg by mouth 3 (three) times daily as needed.     ARIPiprazole (ABILIFY) 10 MG Tab Take 10 mg by mouth once daily.    cyproheptadine (PERIACTIN) 4 mg tablet     HYDROcodone-acetaminophen (NORCO) 5-325 mg per tablet Take 1 tablet by mouth every 6 (six) hours as needed.    ketoconazole (NIZORAL) 2 % cream Apply topically once daily. for 7 days    naproxen (NAPROSYN) 500 MG tablet TAKE 1 TABLET BY MOUTH WITH FOOD OR MILK AS NEEDED EVERY 12 HOURS AFTER COMPLETING NORCO    omeprazole (PRILOSEC) 40 MG capsule Take 1 capsule (40 mg total) by mouth 2 (two) times daily before meals.    pantoprazole (PROTONIX) 40 MG tablet Take 40 mg by mouth once daily.    paroxetine (PAXIL) 10 MG tablet  Take 10 mg by mouth once daily.    paroxetine (PAXIL) 40 MG tablet Take 40 mg by mouth once daily.    promethazine (PHENERGAN) 25 MG tablet Take 1 tablet (25 mg total) by mouth every 6 (six) hours as needed for Nausea.    sucralfate (CARAFATE) 1 gram tablet Take 1 g by mouth 4 (four) times daily.    varenicline (CHANTIX ROSA) 0.5 (11)-1 (42) mg tablet Take one 0.5mg tablet by mouth once daily for 3 days, then increase to one 0.5mg tablet twice daily for 4 days, then increase to one 1mg tablet twice daily.    varenicline (CHANTIX) 1 mg Tab Take 1 tablet (1 mg total) by mouth 2 (two) times daily.    zolpidem (AMBIEN) 10 mg Tab Take 10 mg by mouth nightly as needed.     Family History       Problem Relation (Age of Onset)    Hypertension Father    Stroke Father          Tobacco Use    Smoking status: Every Day     Packs/day: 1.00     Years: 40.00     Pack years: 40.00     Types: Cigarettes    Smokeless tobacco: Never   Substance and Sexual Activity    Alcohol use: No    Drug use: No    Sexual activity: Yes     Partners: Male       Review of Systems   Constitutional:  Positive for fatigue.   Respiratory:  Positive for cough and shortness of breath. Negative for chest tightness.    Cardiovascular:  Positive for palpitations. Negative for chest pain and leg swelling.   Gastrointestinal:  Negative for abdominal pain, diarrhea and nausea.        Melena   Neurological:  Positive for dizziness and weakness. Negative for headaches.   Psychiatric/Behavioral:  Negative for confusion.      Objective:     Vital Signs (Most Recent):  Temp: 99 °F (37.2 °C) (10/07/22 0700)  Pulse: 105 (10/07/22 0700)  Resp: 20 (10/07/22 0700)  BP: 130/81 (10/07/22 0700)  SpO2: 95 % (10/07/22 0700) Vital Signs (24h Range):  Temp:  [97.5 °F (36.4 °C)-99 °F (37.2 °C)] 99 °F (37.2 °C)  Pulse:  [] 105  Resp:  [11-22] 20  SpO2:  [95 %-99 %] 95 %  BP: (114-135)/(49-81) 130/81     Weight: 44.6 kg (98 lb 5.2 oz)  Body mass index is 16.88 kg/m².    SpO2:  95 %  O2 Device (Oxygen Therapy): room air    No intake or output data in the 24 hours ending 10/07/22 0932    Lines/Drains/Airways       Peripheral Intravenous Line  Duration                  Peripheral IV - Single Lumen 10/06/22 2040 22 G Distal;Posterior;Right Forearm <1 day                    Significant Labs:  Recent Results (from the past 72 hour(s))   Comprehensive metabolic panel    Collection Time: 10/06/22  6:41 PM   Result Value Ref Range    Sodium Level 139 136 - 145 mmol/L    Potassium Level 4.0 3.5 - 5.1 mmol/L    Chloride 109 (H) 98 - 107 mmol/L    Carbon Dioxide 21 (L) 23 - 31 mmol/L    Glucose Level 80 (L) 82 - 115 mg/dL    Blood Urea Nitrogen 31.5 (H) 9.8 - 20.1 mg/dL    Creatinine 0.74 0.55 - 1.02 mg/dL    Calcium Level Total 8.5 8.4 - 10.2 mg/dL    Protein Total 6.3 5.8 - 7.6 gm/dL    Albumin Level 2.0 (L) 3.4 - 4.8 gm/dL    Globulin 4.3 (H) 2.4 - 3.5 gm/dL    Albumin/Globulin Ratio 0.5 (L) 1.1 - 2.0 ratio    Bilirubin Total 0.8 <=1.5 mg/dL    Alkaline Phosphatase 195 (H) 40 - 150 unit/L    Alanine Aminotransferase 26 0 - 55 unit/L    Aspartate Aminotransferase 20 5 - 34 unit/L    eGFR >60 mls/min/1.73/m2   Magnesium    Collection Time: 10/06/22  6:41 PM   Result Value Ref Range    Magnesium Level 1.70 1.60 - 2.60 mg/dL   Troponin I    Collection Time: 10/06/22  6:41 PM   Result Value Ref Range    Troponin-I 0.347 (H) 0.000 - 0.045 ng/mL   CBC with Differential    Collection Time: 10/06/22  6:41 PM   Result Value Ref Range    WBC 18.6 (H) 4.5 - 11.5 x10(3)/mcL    RBC 3.85 (L) 4.20 - 5.40 x10(6)/mcL    Hgb 9.7 (L) 12.0 - 16.0 gm/dL    Hct 32.5 (L) 37.0 - 47.0 %    MCV 84.4 80.0 - 94.0 fL    MCH 25.2 (L) 27.0 - 31.0 pg    MCHC 29.8 (L) 33.0 - 36.0 mg/dL    RDW 17.9 (H) 11.5 - 17.0 %    Platelet 133 130 - 400 x10(3)/mcL    MPV      Neut % 89.2 %    Lymph % 5.1 %    Mono % 3.5 %    Eos % 0.3 %    Basophil % 0.4 %    Lymph # 0.94 0.6 - 4.6 x10(3)/mcL    Neut # 16.6 (H) 2.1 - 9.2 x10(3)/mcL    Mono #  0.66 0.1 - 1.3 x10(3)/mcL    Eos # 0.06 0 - 0.9 x10(3)/mcL    Baso # 0.08 0 - 0.2 x10(3)/mcL    IG# 0.28 (H) 0 - 0.04 x10(3)/mcL    IG% 1.5 %    NRBC% 0.1 %   Urinalysis, Reflex to Urine Culture Urine, Clean Catch    Collection Time: 10/06/22  9:46 PM    Specimen: Urine   Result Value Ref Range    Color, UA Yellow Yellow, Colorless, Other, Clear    Appearance, UA Cloudy (A) Clear    Specific Gravity, UA 1.016 1.001 - 1.030    pH, UA 5.5 5.0, 5.5, 6.0, 6.5, 7.0, 7.5, 8.0, 8.5    Protein, UA 1+ (A) Negative mg/dL    Glucose, UA Negative Negative, Normal mg/dL    Ketones, UA Negative Negative mg/dL    Blood, UA 2+ (A) Negative unit/L    Bilirubin, UA Negative Negative mg/dL    Urobilinogen, UA 1.0 0.2, 1.0, Normal mg/dL    Nitrites, UA Positive (A) Negative    Leukocyte Esterase, UA 2+ (A) Negative unit/L   Urinalysis, Microscopic    Collection Time: 10/06/22  9:46 PM   Result Value Ref Range    RBC, UA 14 (H) <=5 /HPF    WBC, UA 30 (H) <=5 /HPF    Squamous Epithelial Cells, UA <5 <=5 /HPF    Bacteria, UA 4+ (A) None Seen, Rare, Occasional /HPF   Troponin I    Collection Time: 10/07/22 12:00 AM   Result Value Ref Range    Troponin-I 0.370 (H) 0.000 - 0.045 ng/mL   Iron and TIBC    Collection Time: 10/07/22 12:00 AM   Result Value Ref Range    Iron Binding Capacity Unsaturated 168 70 - 310 ug/dL    Iron Level 10 (L) 50 - 170 ug/dL    Transferrin 158 (L) 173 - 360 mg/dL    Iron Binding Capacity Total 178 (L) 250 - 450 ug/dL    Iron Saturation 6 (L) 20 - 50 %   Ferritin    Collection Time: 10/07/22 12:00 AM   Result Value Ref Range    Ferritin Level 184.35 4.63 - 204.00 ng/mL   Vitamin B12    Collection Time: 10/07/22 12:00 AM   Result Value Ref Range    Vitamin B12 Level >2,000 (H) 213 - 816 pg/mL   C-Reactive Protein    Collection Time: 10/07/22 12:00 AM   Result Value Ref Range    C-Reactive Protein 208.90 (H) <5.00 mg/L   TSH    Collection Time: 10/07/22 12:00 AM   Result Value Ref Range    Thyroid Stimulating Hormone  3.2412 0.3500 - 4.9400 uIU/mL   Troponin I    Collection Time: 10/07/22  7:15 AM   Result Value Ref Range    Troponin-I 0.386 (H) 0.000 - 0.045 ng/mL   Basic Metabolic Panel    Collection Time: 10/07/22  7:15 AM   Result Value Ref Range    Sodium Level 140 136 - 145 mmol/L    Potassium Level 3.3 (L) 3.5 - 5.1 mmol/L    Chloride 111 (H) 98 - 107 mmol/L    Carbon Dioxide 20 (L) 23 - 31 mmol/L    Glucose Level 62 (L) 82 - 115 mg/dL    Blood Urea Nitrogen 23.2 (H) 9.8 - 20.1 mg/dL    Creatinine 0.78 0.55 - 1.02 mg/dL    BUN/Creatinine Ratio 30     Calcium Level Total 8.3 (L) 8.4 - 10.2 mg/dL    Anion Gap 9.0 mEq/L    eGFR >60 mls/min/1.73/m2   Phosphorus    Collection Time: 10/07/22  7:15 AM   Result Value Ref Range    Phosphorus Level 2.9 2.3 - 4.7 mg/dL   Magnesium    Collection Time: 10/07/22  7:15 AM   Result Value Ref Range    Magnesium Level 1.70 1.60 - 2.60 mg/dL   Lactic Acid, Plasma    Collection Time: 10/07/22  7:15 AM   Result Value Ref Range    Lactic Acid Level 2.2 0.5 - 2.2 mmol/L   Folate    Collection Time: 10/07/22  7:15 AM   Result Value Ref Range    Folate Level 8.9 7.0 - 31.4 ng/mL   D-Dimer, Quantitative    Collection Time: 10/07/22  7:15 AM   Result Value Ref Range    D-Dimer 2.59 (H) 0.00 - 0.50 ug/mL FEU   Hemoglobin A1C    Collection Time: 10/07/22  7:15 AM   Result Value Ref Range    Hemoglobin A1c 5.9 <=7.0 %    Estimated Average Glucose 122.6 mg/dL   CV Ultrasound Bilateral Doppler Carotid    Collection Time: 10/07/22  8:18 AM   Result Value Ref Range    Left ICA/CCA ratio 1.18     Right ICA/CCA ratio 0.91     Left Highest ICA 93.00     Left Highest CCA 80     Right Highest ICA 61.00     Right Highest CCA 73     Right Highest EDV 15.00     LT Highest EDV 56.00     Right CCA prox sys 73 cm/s    Right CCA prox cordero 16 cm/s    Right CCA dist sys 67 cm/s    Right CCA dist cordero 16 cm/s    Right ICA prox sys 46 cm/s    Right ICA prox cordero 12 cm/s    Right ICA mid sys 58 cm/s    Right ICA mid cordero  15 cm/s    Right ICA dist sys 61 cm/s    Right ICA dist cordero 13 cm/s    Right ECA sys 90 cm/s    Right vertebral sys 75 cm/s    Left CCA prox sys 80 cm/s    Left CCA prox cordero 13 cm/s    Left CCA dist sys 79 cm/s    Left CCA dist cordero 11 cm/s    Left ICA prox sys 62 cm/s    Left ICA prox cordero 12 cm/s    Left ICA mid sys 93 cm/s    Left ICA mid cordero 56 cm/s    Left ICA dist sys 77 cm/s    Left ICA dist cordero 29 cm/s    Left ECA sys 80 cm/s    Left vertebral sys 100 cm/s    Right vertebral cordero 14 cm/s    Right ECA cordero 7 cm/s    Left vertebral cordero 17 cm/s    Left ECA cordero 15 cm/s   Echo    Collection Time: 10/07/22  8:55 AM   Result Value Ref Range    BSA 1.42 m2    Right Atrial Pressure (from IVC) 3 mmHg    EF 57 %    Left Ventricular Outflow Tract Mean Velocity 0.67 cm/s    Left Ventricular Outflow Tract Mean Gradient 2.00 mmHg    PV PEAK VELOCITY 0.87 cm/s    LVIDd 4.27 3.5 - 6.0 cm    IVS 0.78 0.6 - 1.1 cm    Posterior Wall 0.90 0.6 - 1.1 cm    LVIDs 2.69 2.1 - 4.0 cm    FS 37 28 - 44 %    LV mass 111.08 g    LA size 2.90 cm    TAPSE 2.17 cm    Left Ventricle Relative Wall Thickness 0.42 cm    AV mean gradient 7 mmHg    AV valve area 1.67 cm2    AV Velocity Ratio 0.56     AV index (prosthetic) 0.53     MV mean gradient 3 mmHg    MV valve area p 1/2 method 5.64 cm2    MV valve area by continuity eq 1.91 cm2    E/A ratio 0.77     E wave deceleration time 156.00 msec    LVOT diameter 2.00 cm    LVOT area 3.1 cm2    LVOT peak martin 1.00 m/s    LVOT peak VTI 15.20 cm    Ao peak martin 1.79 m/s    Ao VTI 28.6 cm    Vn Nyquist 0.39 m/s    Radius 0.80 cm    Mr max martin 5.70 m/s    LVOT stroke volume 47.73 cm3    AV peak gradient 13 mmHg    MV peak gradient 5 mmHg    TV rest pulmonary artery pressure 35 mmHg    Vn Nyquist MS 0.39 m/s    MV Peak E Martin 0.88 m/s    MR PISA EROA 0.27 cm2    TR Max Martin 2.83 m/s    MV VTI 25.0 cm    MV stenosis pressure 1/2 time 39.00 ms    MV Peak A Martin 1.14 m/s    LV Systolic Volume 26.80 mL    LV  Systolic Volume Index 18.5 mL/m2    LV Diastolic Volume 81.70 mL    LV Diastolic Volume Index 56.34 mL/m2    LV Mass Index 77 g/m2    Triscuspid Valve Regurgitation Peak Gradient 32 mmHg    LA Volume Index (Mod) 19.4 mL/m2    LA volume (mod) 28.10 cm3       Significant Imaging:  Imaging Results              X-Ray Chest PA And Lateral (Final result)  Result time 10/07/22 06:15:11      Final result by Arnoldo Grant MD (10/07/22 06:15:11)                   Impression:      Right greater than left lung consolidation compatible with atypical infection      Electronically signed by: Arnoldo Grant MD  Date:    10/07/2022  Time:    06:15               Narrative:    EXAMINATION:  XR CHEST PA AND LATERAL    CLINICAL HISTORY:  Weakness    COMPARISON:  07/07/2021    FINDINGS:  PA and lateral views of the chest show patchy consolidation in the right greater than left lung bases.  No pneumothorax or large effusion.  Aorta is partially calcified.  Cardiac silhouette and pulmonary vasculature are normal.  No acute osseous findings.                                       CT Head Without Contrast (Final result)  Result time 10/06/22 19:32:02      Final result by Arnoldo Grant MD (10/06/22 19:32:02)                   Impression:      No acute intracranial findings.      Electronically signed by: Arnoldo Grant MD  Date:    10/06/2022  Time:    19:32               Narrative:    EXAMINATION:  CT HEAD WITHOUT CONTRAST    CLINICAL HISTORY:  Dizziness for 2 weeks    TECHNIQUE:  Axial CT images were obtained through the head without contrast.  Coronal and sagittal reformations were also performed.  Total .  Automated exposure control utilized.    COMPARISON:  None.    FINDINGS:  No hemorrhage, mass effect or CT evidence of acute cortical infarction.  White-matter hypodensities are nonspecific but likely related to small vessel ischemic disease.  Chronic lacunar infarcts are in the right basal ganglia and right cerebellum.   Ventricles and sulci are proportionate.    No abnormal extra-axial fluid collections.    No hyperdense artery or vein.    No skull fracture or aggressive osseous process.  Visualized paranasal sinuses and mastoid air cells are clear.                                      EKG:    Results for orders placed or performed during the hospital encounter of 10/06/22   EKG 12-lead    Narrative    Test Reason : R42,    Vent. Rate : 088 BPM     Atrial Rate : 088 BPM     P-R Int : 114 ms          QRS Dur : 076 ms      QT Int : 346 ms       P-R-T Axes : 076 057 051 degrees     QTc Int : 418 ms    Normal sinus rhythm  Right atrial enlargement  Nonspecific ST abnormality  Abnormal ECG  No previous ECGs available  Confirmed by Chas Gibson MD ([id number]) on 10/7/2022 5:34:57 AM    Referred By: RAMSES   SELF           Confirmed By:Chas Gibson MD       Telemetry:  NR    Physical Exam  Vitals reviewed.   Constitutional:       General: She is not in acute distress.     Appearance: She is ill-appearing. She is not toxic-appearing or diaphoretic.   HENT:      Head: Normocephalic and atraumatic.   Cardiovascular:      Rate and Rhythm: Regular rhythm. Tachycardia present.      Heart sounds: No murmur heard.  Pulmonary:      Effort: Pulmonary effort is normal. No respiratory distress.      Breath sounds: Normal breath sounds. No wheezing.   Abdominal:      General: There is no distension.      Palpations: Abdomen is soft.      Tenderness: There is no abdominal tenderness. There is no guarding.   Musculoskeletal:      Right lower leg: No edema.      Left lower leg: No edema.   Skin:     General: Skin is warm and dry.      Findings: Bruising (multiple ecchymosis on bilateral upper extremities) present.   Neurological:      Mental Status: She is alert and oriented to person, place, and time.      Motor: Weakness present.   Psychiatric:         Thought Content: Thought content normal.         Judgment: Judgment normal.       Home Medications:   No  current facility-administered medications on file prior to encounter.     Current Outpatient Medications on File Prior to Encounter   Medication Sig Dispense Refill    alprazolam (XANAX) 0.5 MG tablet Take 0.5 mg by mouth 3 (three) times daily as needed.       ARIPiprazole (ABILIFY) 10 MG Tab Take 10 mg by mouth once daily.      cyproheptadine (PERIACTIN) 4 mg tablet       HYDROcodone-acetaminophen (NORCO) 5-325 mg per tablet Take 1 tablet by mouth every 6 (six) hours as needed.      ketoconazole (NIZORAL) 2 % cream Apply topically once daily. for 7 days 60 g 0    naproxen (NAPROSYN) 500 MG tablet TAKE 1 TABLET BY MOUTH WITH FOOD OR MILK AS NEEDED EVERY 12 HOURS AFTER COMPLETING NORCO      omeprazole (PRILOSEC) 40 MG capsule Take 1 capsule (40 mg total) by mouth 2 (two) times daily before meals. 60 capsule 2    pantoprazole (PROTONIX) 40 MG tablet Take 40 mg by mouth once daily.      paroxetine (PAXIL) 10 MG tablet Take 10 mg by mouth once daily.      paroxetine (PAXIL) 40 MG tablet Take 40 mg by mouth once daily.      promethazine (PHENERGAN) 25 MG tablet Take 1 tablet (25 mg total) by mouth every 6 (six) hours as needed for Nausea. 15 tablet 0    sucralfate (CARAFATE) 1 gram tablet Take 1 g by mouth 4 (four) times daily.      varenicline (CHANTIX ROSA) 0.5 (11)-1 (42) mg tablet Take one 0.5mg tablet by mouth once daily for 3 days, then increase to one 0.5mg tablet twice daily for 4 days, then increase to one 1mg tablet twice daily. 42 tablet 0    varenicline (CHANTIX) 1 mg Tab Take 1 tablet (1 mg total) by mouth 2 (two) times daily. 60 tablet 3    zolpidem (AMBIEN) 10 mg Tab Take 10 mg by mouth nightly as needed.         Current Inpatient Medications:    Current Facility-Administered Medications:     acetaminophen tablet 1,000 mg, 1,000 mg, Oral, Q6H PRN, Timothy Shipley MD    acetaminophen tablet 650 mg, 650 mg, Oral, Q4H PRN, Timothy Shipley MD    albuterol-ipratropium 2.5 mg-0.5 mg/3 mL nebulizer solution 3 mL, 3 mL,  Nebulization, Q4H PRN, NELI Singh    aluminum-magnesium hydroxide-simethicone 200-200-20 mg/5 mL suspension 30 mL, 30 mL, Oral, QID PRN, Timothy Shipley MD    atorvastatin tablet 40 mg, 40 mg, Oral, Daily, Timothy Shipley MD    AZITHROMYCIN 500 MG/250 ML D5W (READY TO MIX SYSTEM) 500 mg IVPB, 500 mg, Intravenous, Q24H, NELI Singh    cefTRIAXone (ROCEPHIN) 1 g in dextrose 5 % in water (D5W) 5 % 50 mL IVPB (MB+), 1 g, Intravenous, Q24H, NELI Sinhg, Stopped at 10/07/22 0235    ferric gluconate (FERRLECIT) 125 mg in sodium chloride 0.9% 100 mL IVPB, 125 mg, Intravenous, Daily, Timothy Shipley MD    lactated ringers infusion, , Intravenous, Continuous, NELI Singh, Last Rate: 125 mL/hr at 10/07/22 0049, Rate Change at 10/07/22 0049    melatonin tablet 6 mg, 6 mg, Oral, Nightly PRN, Timothy Shipley MD    multivitamin tablet, 1 tablet, Oral, Daily, Timothy Shipley MD    nicotine 14 mg/24 hr 1 patch, 1 patch, Transdermal, Daily, NELI Singh    ondansetron injection 4 mg, 4 mg, Intravenous, Q4H PRN, Timothy Shipley MD    pantoprazole injection 40 mg, 40 mg, Intravenous, BID, NELI Singh    polyethylene glycol packet 17 g, 17 g, Oral, BID PRN, Timothy Shilpey MD    potassium bicarbonate disintegrating tablet 50 mEq, 50 mEq, Oral, Once, Timothy Shipley MD    prochlorperazine injection Soln 5 mg, 5 mg, Intravenous, Q6H PRN, Timothy Shipley MD    senna-docusate 8.6-50 mg per tablet 1 tablet, 1 tablet, Oral, BID PRN, Timothy Shipley MD    simethicone chewable tablet 80 mg, 1 tablet, Oral, QID PRN, Timothy Shipley MD    sodium chloride 0.9% flush 10 mL, 10 mL, Intravenous, PRN, Timothy Shipley MD    zinc oxide-cod liver oil 40 % paste, , Topical (Top), PRN, Dorothy Small, ISRRAELP         VTE Risk Mitigation (From admission, onward)           Ordered     IP VTE LOW RISK PATIENT  Once         10/07/22 0815     Place sequential compression device  Until discontinued         10/07/22 0815                    Assessment:   NSTEMI  II 2/2 demand ischemia 2/2 Sepsis vs Anemia  Tachycardia  Abnormal EKG  Hx of CVAs   -MRI with several acute and chronic infarcts   -CUS no stenosis/occlusion   -CTA head and neck, Echo w/bubble pending  Sepsis with presumed UTI source  Hypokalemia   -50meq PO Potassium ordered  Hx of PUD   -hx of gastric perforation, s/p gastrectomy 2018  Anemia of chronic disease   -GI consulted   -currently on IV Fe  Tobacco use      Plan:   Will hold of on any invasive cardiac work up due to acute medical condition and co-morbidities  Patient unable to tolerate DAPT given hx of PUD with gastric perforation requiring gastrectomy  Can discontinue troponin trend   Continue statin, consider increase to Lipitor 80mg  Recommend initiating metoprolol 25mg daily   With a hx of recurrent stroke, plan for 2 week MCT to evaluate for arrhythmias, if abnormal recommend MARIUM/linq  Will follow up outpatient upon discharge for chemical stress test  3 point drop in Hbg  from 6/2022. Recommend transfusing if hgb <7  Discussed tobacco cessation   Remainder of care per primary    Will sign off at this time, please call with any questions  Thank you for your consult.     I have reviewed and concur with the resident's history, physical, assessment, and plan.  I have personally interviewed and examined the patient at bedside.      Alla Pillai MD  U  PGY II  Cardiology  [facility] - 6th Floor Medical Telemetry  10/07/2022 9:32 AM

## 2022-10-07 NOTE — SUBJECTIVE & OBJECTIVE
Past Medical History:   Diagnosis Date    Common bile duct dilatation     Epigastric abdominal pain     Gastric ulcer, acute with hemorrhage and perforation     PUD (peptic ulcer disease)        Past Surgical History:   Procedure Laterality Date    APPENDECTOMY       SECTION, CLASSIC      CHOLECYSTECTOMY      Distal gastrectomy      GASTRECTOMY      HYSTERECTOMY      Rectovaginal fistula repair      TONSILLECTOMY      VAGOTOMY AND PYLOROPLASTY         Review of patient's allergies indicates:   Allergen Reactions    Penicillins Hives and Blisters       No current facility-administered medications on file prior to encounter.     Current Outpatient Medications on File Prior to Encounter   Medication Sig    alprazolam (XANAX) 0.5 MG tablet Take 0.5 mg by mouth 3 (three) times daily as needed.     ARIPiprazole (ABILIFY) 10 MG Tab Take 10 mg by mouth once daily.    cyproheptadine (PERIACTIN) 4 mg tablet     HYDROcodone-acetaminophen (NORCO) 5-325 mg per tablet Take 1 tablet by mouth every 6 (six) hours as needed.    ketoconazole (NIZORAL) 2 % cream Apply topically once daily. for 7 days    naproxen (NAPROSYN) 500 MG tablet TAKE 1 TABLET BY MOUTH WITH FOOD OR MILK AS NEEDED EVERY 12 HOURS AFTER COMPLETING NORCO    omeprazole (PRILOSEC) 40 MG capsule Take 1 capsule (40 mg total) by mouth 2 (two) times daily before meals.    pantoprazole (PROTONIX) 40 MG tablet Take 40 mg by mouth once daily.    paroxetine (PAXIL) 10 MG tablet Take 10 mg by mouth once daily.    paroxetine (PAXIL) 40 MG tablet Take 40 mg by mouth once daily.    promethazine (PHENERGAN) 25 MG tablet Take 1 tablet (25 mg total) by mouth every 6 (six) hours as needed for Nausea.    sucralfate (CARAFATE) 1 gram tablet Take 1 g by mouth 4 (four) times daily.    varenicline (CHANTIX ROSA) 0.5 (11)-1 (42) mg tablet Take one 0.5mg tablet by mouth once daily for 3 days, then increase to one 0.5mg tablet twice daily for 4 days, then increase to one 1mg tablet  twice daily.    varenicline (CHANTIX) 1 mg Tab Take 1 tablet (1 mg total) by mouth 2 (two) times daily.    zolpidem (AMBIEN) 10 mg Tab Take 10 mg by mouth nightly as needed.     Family History       Problem Relation (Age of Onset)    Hypertension Father    Stroke Father          Tobacco Use    Smoking status: Every Day     Packs/day: 1.00     Years: 40.00     Pack years: 40.00     Types: Cigarettes    Smokeless tobacco: Never   Substance and Sexual Activity    Alcohol use: No    Drug use: No    Sexual activity: Yes     Partners: Male     Review of Systems   Musculoskeletal:  Positive for gait problem.   Neurological:  Positive for dizziness, tremors and weakness.   All other systems reviewed and are negative.    Objective:     Vital Signs (Most Recent):  Temp: 98.6 °F (37 °C) (10/07/22 1100)  Pulse: 96 (10/07/22 1100)  Resp: 19 (10/07/22 1100)  BP: (!) 144/82 (10/07/22 1100)  SpO2: 96 % (10/07/22 1100)   Vital Signs (24h Range):  Temp:  [97.5 °F (36.4 °C)-99 °F (37.2 °C)] 98.6 °F (37 °C)  Pulse:  [] 96  Resp:  [11-22] 19  SpO2:  [95 %-99 %] 96 %  BP: (114-144)/(49-82) 144/82     Weight: 44.6 kg (98 lb 5.2 oz)  Body mass index is 16.88 kg/m².    Physical Exam  Constitutional:       General: She is not in acute distress.     Appearance: She is underweight. She is ill-appearing.   Eyes:      Extraocular Movements: Extraocular movements intact.      Pupils: Pupils are equal, round, and reactive to light.   Cardiovascular:      Rate and Rhythm: Normal rate and regular rhythm.   Pulmonary:      Effort: Pulmonary effort is normal.   Skin:     General: Skin is warm and dry.      Comments: Multiple areas of bruising bilateral arms   Neurological:      Mental Status: She is alert.      Cranial Nerves: Dysarthria present. No facial asymmetry.      Sensory: Sensation is intact.      Motor: Weakness (LUE 4/5, LLE 3/5) and tremor (bilateral hand tremor) present.      Coordination: Finger-Nose-Finger Test abnormal (LUE  ataxia).   Psychiatric:         Mood and Affect: Mood normal.         Significant Labs: BMP:   Recent Labs   Lab 10/06/22  1841 10/07/22  0715    140   K 4.0 3.3*   CO2 21* 20*   BUN 31.5* 23.2*   CREATININE 0.74 0.78   CALCIUM 8.5 8.3*   MG 1.70 1.70     CBC:   Recent Labs   Lab 10/06/22  1841 10/07/22  1026   WBC 18.6* 15.1*   HGB 9.7* 8.3*   HCT 32.5* 28.1*    142       Significant Imaging: I have reviewed all pertinent imaging results/findings within the past 24 hours.

## 2022-10-07 NOTE — NURSING
Diet order received from provider/ with indication to try bedside swallowing  test. Pt passed test with no cough nor throat clearing. Order added, please see MAR

## 2022-10-07 NOTE — CONSULTS
[facility] - 6th Floor Medical Telemetry  Neurology  Consult Note    Patient Name: Nimo Dill  MRN: [id number]  Admission Date: 10/6/2022  Hospital Length of Stay: 1 days  Code Status: Full Code   Attending Provider: Timothy Shipley MD   Consulting Provider: NELI Ivey  Primary Care Physician: NELI Hodges  Principal Problem:<principal problem not specified>    Inpatient consult to Neurology  Consult performed by: NELI Ivey  Consult ordered by: Timothy Shipley MD         Subjective:     Chief Complaint:    Chief Complaint   Patient presents with    Dizziness     Pt to ER via POV for dizziness.  Started 2 weeks ago.  Was seen in [place] and had MRI.  States also weak and decreased appetite.  Pt states off balance and falling a lot.  Pt states missed the first appt and has not called them back          HPI:   Nimo Dill is a 66 y.o. female, with past medical history of PUD, gastric ulcer, smoker, who presented to [facility] on 10/6/2022 with reports of generalized weakness and increasing falls x 2 weeks.  She was recently evaluated at another facility due to multiple syncopal episodes and dizziness ... MRI (22) showed old cerebellar infarcts and old right centrum semiovale infarct.  She had worsening dizziness, increased number of falls, and slurred speech which prompted her to go to ED for further evaluation.  Initial labs noted elevated troponin and UTI.  CTh showed no acute intracranial abnormality.  She was admitted to Hospitalist service and neurology was consulted for stroke workup.       Past Medical History:   Diagnosis Date    Common bile duct dilatation     Epigastric abdominal pain     Gastric ulcer, acute with hemorrhage and perforation     PUD (peptic ulcer disease)        Past Surgical History:   Procedure Laterality Date    APPENDECTOMY       SECTION, CLASSIC      CHOLECYSTECTOMY      Distal gastrectomy      GASTRECTOMY      HYSTERECTOMY       Rectovaginal fistula repair      TONSILLECTOMY      VAGOTOMY AND PYLOROPLASTY         Review of patient's allergies indicates:   Allergen Reactions    Penicillins Hives and Blisters       No current facility-administered medications on file prior to encounter.     Current Outpatient Medications on File Prior to Encounter   Medication Sig    alprazolam (XANAX) 0.5 MG tablet Take 0.5 mg by mouth 3 (three) times daily as needed.     ARIPiprazole (ABILIFY) 10 MG Tab Take 10 mg by mouth once daily.    cyproheptadine (PERIACTIN) 4 mg tablet     HYDROcodone-acetaminophen (NORCO) 5-325 mg per tablet Take 1 tablet by mouth every 6 (six) hours as needed.    ketoconazole (NIZORAL) 2 % cream Apply topically once daily. for 7 days    naproxen (NAPROSYN) 500 MG tablet TAKE 1 TABLET BY MOUTH WITH FOOD OR MILK AS NEEDED EVERY 12 HOURS AFTER COMPLETING NORCO    omeprazole (PRILOSEC) 40 MG capsule Take 1 capsule (40 mg total) by mouth 2 (two) times daily before meals.    pantoprazole (PROTONIX) 40 MG tablet Take 40 mg by mouth once daily.    paroxetine (PAXIL) 10 MG tablet Take 10 mg by mouth once daily.    paroxetine (PAXIL) 40 MG tablet Take 40 mg by mouth once daily.    promethazine (PHENERGAN) 25 MG tablet Take 1 tablet (25 mg total) by mouth every 6 (six) hours as needed for Nausea.    sucralfate (CARAFATE) 1 gram tablet Take 1 g by mouth 4 (four) times daily.    varenicline (CHANTIX ROSA) 0.5 (11)-1 (42) mg tablet Take one 0.5mg tablet by mouth once daily for 3 days, then increase to one 0.5mg tablet twice daily for 4 days, then increase to one 1mg tablet twice daily.    varenicline (CHANTIX) 1 mg Tab Take 1 tablet (1 mg total) by mouth 2 (two) times daily.    zolpidem (AMBIEN) 10 mg Tab Take 10 mg by mouth nightly as needed.     Family History       Problem Relation (Age of Onset)    Hypertension Father    Stroke Father          Tobacco Use    Smoking status: Every Day     Packs/day: 1.00     Years: 40.00     Pack years: 40.00      Types: Cigarettes    Smokeless tobacco: Never   Substance and Sexual Activity    Alcohol use: No    Drug use: No    Sexual activity: Yes     Partners: Male     Review of Systems   Musculoskeletal:  Positive for gait problem.   Neurological:  Positive for dizziness, tremors and weakness.   All other systems reviewed and are negative.    Objective:     Vital Signs (Most Recent):  Temp: 98.6 °F (37 °C) (10/07/22 1100)  Pulse: 96 (10/07/22 1100)  Resp: 19 (10/07/22 1100)  BP: (!) 144/82 (10/07/22 1100)  SpO2: 96 % (10/07/22 1100)   Vital Signs (24h Range):  Temp:  [97.5 °F (36.4 °C)-99 °F (37.2 °C)] 98.6 °F (37 °C)  Pulse:  [] 96  Resp:  [11-22] 19  SpO2:  [95 %-99 %] 96 %  BP: (114-144)/(49-82) 144/82     Weight: 44.6 kg (98 lb 5.2 oz)  Body mass index is 16.88 kg/m².    Physical Exam  Constitutional:       General: She is not in acute distress.     Appearance: She is underweight. She is ill-appearing.   Eyes:      Extraocular Movements: Extraocular movements intact.      Pupils: Pupils are equal, round, and reactive to light.   Cardiovascular:      Rate and Rhythm: Normal rate and regular rhythm.   Pulmonary:      Effort: Pulmonary effort is normal.   Skin:     General: Skin is warm and dry.      Comments: Multiple areas of bruising bilateral arms   Neurological:      Mental Status: She is alert.      Cranial Nerves: Dysarthria present. No facial asymmetry.      Sensory: Sensation is intact.      Motor: Weakness (LUE 4/5, LLE 3/5) and tremor (bilateral hand tremor) present.      Coordination: Finger-Nose-Finger Test abnormal (LUE ataxia).   Psychiatric:         Mood and Affect: Mood normal.         Significant Labs: BMP:   Recent Labs   Lab 10/06/22  1841 10/07/22  0715    140   K 4.0 3.3*   CO2 21* 20*   BUN 31.5* 23.2*   CREATININE 0.74 0.78   CALCIUM 8.5 8.3*   MG 1.70 1.70     CBC:   Recent Labs   Lab 10/06/22  1841 10/07/22  1026   WBC 18.6* 15.1*   HGB 9.7* 8.3*   HCT 32.5* 28.1*    142        Significant Imaging: I have reviewed all pertinent imaging results/findings within the past 24 hours.    Assessment and Plan:     Stroke  Stroke - bilateral infarcts    Continue stroke workup  Continue Atorvastatin 40mg daily  Add ASA 325mg daily  PT/OT/ST to evaluate      Antithrombotics for secondary stroke prevention: Antiplatelets: Aspirin: 325 mg daily    Statins for secondary stroke prevention and hyperlipidemia, if present: Statins: Atorvastatin- 40 mg daily    Aggressive risk factor modification: Smoking, HLD     Rehab efforts: The patient has been evaluated by a stroke team provider and the therapy needs have been fully considered based off the presenting complaints and exam findings. The following therapy evaluations are needed: PT evaluate and treat, OT evaluate and treat, SLP evaluate and treat    Diagnostics ordered/pending: CTA Head to assess vasculature , CTA Neck/Arch to assess vasculature  Stroke workup:  -MRI brain: Several acute pontine and infratentorial supratentorial nonhemorrhagic lacunar infarct  -ECHO: completed, interpretation pending  -CUS: negative  -A1c: 5.9  -TSH: 3.2412  -CRP: 208.90  -ESR: 73  -not on antiplatelet, anticoagulation, or statin therapy at home    VTE prophylaxis: Mechanical prophylaxis: Place SCDs    BP parameters: Infarct: normalize blood pressure                Thank you for your consult.  Further recommendations to follow by MD.    Verito Laird, P  Neurology  [facility] - 6th Floor Medical Telemetry    I have seen/examined the patient.  NP was scribe.  I agree with the findings unless outlined below:    Kojo Wallace MD  [facility]     Pt on aspirin currently  I suspect this is afib related; will monitor an dlikely put her on OAC in a couple days

## 2022-10-07 NOTE — ED NOTES
Release of Information consent form signed and faxed to [facility] to obtain previous records.

## 2022-10-07 NOTE — ED PROVIDER NOTES
Encounter Date: 10/6/2022       History     Chief Complaint   Patient presents with    Dizziness     Pt to ER via POV for dizziness.  Started 2 weeks ago.  Was seen in [place] and had MRI.  States also weak and decreased appetite.  Pt states off balance and falling a lot.  Pt states missed the first appt and has not called them back     Patient is a 66-year-old female  that presents with dizziness that has been present 2 week. Associated symptoms off balance, headache, and lower back pain. Surrounding information is patient was seen and not blue [facility] by a neurologist who ordered a MRI. Exacerbated by nothing. Relieved by nothing. Patient treatment prior to arrival none. Risk factors include none. Other history pertaining to this complaint patient did miss her follow-up neurology appointment.       The history is provided by the patient. No  was used.   Review of patient's allergies indicates:   Allergen Reactions    Penicillins Hives and Blisters     Past Medical History:   Diagnosis Date    Common bile duct dilatation     Epigastric abdominal pain     Gastric ulcer, acute with hemorrhage and perforation     PUD (peptic ulcer disease)      Past Surgical History:   Procedure Laterality Date    APPENDECTOMY       SECTION, CLASSIC      CHOLECYSTECTOMY      Distal gastrectomy      GASTRECTOMY      HYSTERECTOMY      Rectovaginal fistula repair      TONSILLECTOMY      VAGOTOMY AND PYLOROPLASTY       Family History   Problem Relation Age of Onset    Stroke Father     Hypertension Father      Social History     Tobacco Use    Smoking status: Every Day     Packs/day: 1.00     Years: 40.00     Pack years: 40.00     Types: Cigarettes    Smokeless tobacco: Never   Substance Use Topics    Alcohol use: No    Drug use: No     Review of Systems   Constitutional:  Negative for fever.   Respiratory:  Negative for cough and shortness of breath.    Cardiovascular:  Negative for chest pain.    Gastrointestinal:  Negative for abdominal pain.   Genitourinary:  Negative for difficulty urinating and dysuria.   Musculoskeletal:  Negative for gait problem.   Skin:  Negative for color change.   Neurological:  Positive for dizziness. Negative for speech difficulty and headaches.   Psychiatric/Behavioral:  Negative for hallucinations and suicidal ideas.    All other systems reviewed and are negative.    Physical Exam     Initial Vitals [10/06/22 1822]   BP Pulse Resp Temp SpO2   124/75 105 18 98.4 °F (36.9 °C) 99 %      MAP       --         Physical Exam    Nursing note and vitals reviewed.  Constitutional: She appears well-developed and well-nourished.   HENT:   Head: Normocephalic.   Eyes: EOM are normal.   Neck:   Normal range of motion.  Cardiovascular:  Normal rate, regular rhythm, normal heart sounds and intact distal pulses.           Pulmonary/Chest: Breath sounds normal. No respiratory distress.   Abdominal: Abdomen is soft. Bowel sounds are normal. There is no abdominal tenderness.   Musculoskeletal:         General: Normal range of motion.      Cervical back: Normal range of motion.     Neurological: She is alert and oriented to person, place, and time. She has normal strength.   Skin: Skin is warm and dry.   Psychiatric: She has a normal mood and affect. Her behavior is normal. Judgment and thought content normal.       ED Course   Procedures  Labs Reviewed   COMPREHENSIVE METABOLIC PANEL - Abnormal; Notable for the following components:       Result Value    Chloride 109 (*)     Carbon Dioxide 21 (*)     Glucose Level 80 (*)     Blood Urea Nitrogen 31.5 (*)     Albumin Level 2.0 (*)     Globulin 4.3 (*)     Albumin/Globulin Ratio 0.5 (*)     Alkaline Phosphatase 195 (*)     All other components within normal limits   TROPONIN I - Abnormal; Notable for the following components:    Troponin-I 0.347 (*)     All other components within normal limits   CBC WITH DIFFERENTIAL - Abnormal; Notable for the  following components:    WBC 18.6 (*)     RBC 3.85 (*)     Hgb 9.7 (*)     Hct 32.5 (*)     MCH 25.2 (*)     MCHC 29.8 (*)     RDW 17.9 (*)     Neut # 16.6 (*)     IG# 0.28 (*)     All other components within normal limits   MAGNESIUM - Normal   BLOOD CULTURE [facility]   BLOOD CULTURE [facility]   CBC W/ AUTO DIFFERENTIAL    Narrative:     The following orders were created for panel order CBC auto differential.  Procedure                               Abnormality         Status                     ---------                               -----------         ------                     CBC with Differential[[id number]]        Abnormal            Final result                 Please view results for these tests on the individual orders.   URINALYSIS, REFLEX TO URINE CULTURE          Imaging Results              X-Ray Chest PA And Lateral (In process)                      CT Head Without Contrast (Final result)  Result time 10/06/22 19:32:02      Final result by Arnoldo Grant MD (10/06/22 19:32:02)                   Impression:      No acute intracranial findings.      Electronically signed by: Arnoldo Grant MD  Date:    10/06/2022  Time:    19:32               Narrative:    EXAMINATION:  CT HEAD WITHOUT CONTRAST    CLINICAL HISTORY:  Dizziness for 2 weeks    TECHNIQUE:  Axial CT images were obtained through the head without contrast.  Coronal and sagittal reformations were also performed.  Total .  Automated exposure control utilized.    COMPARISON:  None.    FINDINGS:  No hemorrhage, mass effect or CT evidence of acute cortical infarction.  White-matter hypodensities are nonspecific but likely related to small vessel ischemic disease.  Chronic lacunar infarcts are in the right basal ganglia and right cerebellum.  Ventricles and sulci are proportionate.    No abnormal extra-axial fluid collections.    No hyperdense artery or vein.    No skull fracture or aggressive osseous process.  Visualized paranasal sinuses and mastoid  air cells are clear.                                       Medications   lactated ringers infusion (has no administration in time range)   enoxaparin injection 40 mg (has no administration in time range)   sodium chloride 0.9% bolus 1,000 mL (1,000 mLs Intravenous New Bag 10/6/22 2045)                 ED Course as of 10/06/22 2147   Thu Oct 06, 2022   2107  paged  [CL]   2132 Spoke to Dorothy granado and will notify cardiology [CL]   2141 Leonard with [facility] ok to give one dose of lovenox  [CL]      ED Course User Index  [CL] NELI Handley                 Clinical Impression:   Final diagnoses:  [R42] Dizziness  [R53.1] Weakness  [R77.8] Elevated troponin  [R55] Near syncope  [D72.829] Leukocytosis, unspecified type (Primary)        ED Disposition Condition    Admit Stable                NELI Handley  10/06/22 2147

## 2022-10-07 NOTE — PROGRESS NOTES
Initital visit, noting significant moisture associated dermatitus perianally , recommended local wound care. Patient mobilizes self in bed fair , for off loading of bony prominences.    10/07/22 1000        Altered Skin Integrity 10/06/22 2249 medial Perineum #1 Incontinence associated dermatitis Partial thickness tissue loss. Shallow open ulcer with a red or pink wound bed, without slough. Intact or Open/Ruptured Serum-filled blister.   Date First Assessed/Time First Assessed: 10/06/22 2249   Altered Skin Integrity Present on Admission: yes  Orientation: medial  Location: Perineum  Wound Number: #1  Is this injury device related?: No  Primary Wound Type: Incontinence associated derma...   Wound Image    Description of Altered Skin Integrity   (moisture associated dermatitus.)   Dressing Appearance Open to air   Drainage Amount Scant   Drainage Characteristics/Odor Serous   Appearance Red  (macerated)   Red (%), Wound Tissue Color 100 %   Periwound Area Intact   Wound Edges Jagged   Wound Length (cm) 10 cm   Wound Width (cm) 8 cm   Wound Surface Area (cm^2) 80 cm^2   Care Cleansed with:;Wound cleanser   Dressing   (open to iar.)

## 2022-10-07 NOTE — PLAN OF CARE
Problem: Occupational Therapy  Goal: Occupational Therapy Goal  Description: Goals to be met by: 11/11/22     Patient will increase functional independence with ADLs by performing:    UE Dressing with Modified Emmons.  LE Dressing with Modified Emmons.  Grooming while seated with Modified Emmons.  Toileting from bedside commode with Modified Emmons for hygiene and clothing management.   Toilet transfer to bedside commode with Modified Emmons.    Outcome: Ongoing, Progressing

## 2022-10-07 NOTE — HPI
Nimo Dill is a 66 y.o. female, with past medical history of PUD, gastric ulcer, smoker, who presented to [facility] on 10/6/2022 with reports of generalized weakness and increasing falls x 2 weeks.  She was recently evaluated at another facility due to multiple syncopal episodes and dizziness ... MRI (9/13/22) showed old cerebellar infarcts and old right centrum semiovale infarct.  She had worsening dizziness, increased number of falls, and slurred speech which prompted her to go to ED for further evaluation.  Initial labs noted elevated troponin and UTI.  CTh showed no acute intracranial abnormality.  She was admitted to Hospitalist service and neurology was consulted for stroke workup.

## 2022-10-07 NOTE — PLAN OF CARE
Problem: Adult Inpatient Plan of Care  Goal: Plan of Care Review  10/7/2022 1639 by Donta Floyd RN  Outcome: Ongoing, Progressing  10/7/2022 1639 by Donta Floyd RN  Outcome: Ongoing, Progressing  Goal: Patient-Specific Goal (Individualized)  Outcome: Ongoing, Progressing  Goal: Absence of Hospital-Acquired Illness or Injury  Outcome: Ongoing, Progressing  Goal: Optimal Comfort and Wellbeing  Outcome: Ongoing, Progressing  Goal: Readiness for Transition of Care  Outcome: Ongoing, Progressing

## 2022-10-07 NOTE — PT/OT/SLP EVAL
Physical Therapy Evaluation    Patient Name:  Nimo Dill   MRN:  [id number]    Recommendations:     Discharge Recommendations:  rehabilitation facility   Discharge Equipment Recommendations: walker, rolling, shower chair   Barriers to discharge:  therapy need, fall risk    Assessment:     Nimo Dill is a 66 y.o. female admitted with a medical diagnosis of several acute pontine and infratentorial lacunar infarcts, 2 weeks of dizziness, falling, sepsis, CAP R lung , UTI, NSTEMI.  She presents with the following impairments/functional limitations:  weakness, impaired endurance, impaired self care skills, impaired functional mobility, impaired balance, gait instability. At baseline, pt mobilizes with a rollator walker and independent with mobility. She normally walks the dogs and goes to the grocery store, but in the past two weeks she has gotten progressively weaker and her  has had to help her with ADLS. Recommending Rehab at d/c. Pt is a high fall risk.    Rehab Prognosis: Good; patient would benefit from acute skilled PT services to address these deficits and reach maximum level of function.    Recent Surgery: * No surgery found *      Plan:     During this hospitalization, patient to be seen 6 x/week to address the identified rehab impairments via gait training, therapeutic activities, therapeutic exercises, neuromuscular re-education and progress toward the following goals:    Plan of Care Expires:  11/07/22    Subjective     Chief Complaint: fatigue  Patient/Family Comments/goals: to get better  Pain/Comfort:  Pain Rating 1: 0/10    Patients cultural, spiritual, Restoration conflicts given the current situation: no    Living Environment:  One story home with , ramped entrance.   Prior to admission, patients level of function was independent with rollator.  Equipment used at home: rollator, shower chair, grab bar.  Upon discharge, patient will have assistance from .    Objective:      Communicated with nurse prior to session.  Patient found supine with telemetry, peripheral IV  upon PT entry to room.    General Precautions: Standard, fall   Orthopedic Precautions:    Braces:    Respiratory Status: Room air  /74 and  while sitting EOB. Pt c/o dizziness, however BP was stable.     Exams:  Gross Motor Coordination:  WFL  Sensation:    -       Intact  RLE ROM: WNL  RLE Strength: Deficits: grossly 4-/5  LLE ROM: WFL  LLE Strength: Deficits: grossly 4-/5     Functional Mobility:  Bed Mobility:     Scooting: moderate assistance  Supine to Sit: moderate assistance  Sit to Supine: moderate assistance  Transfers:     Sit to Stand:  moderate assistance with rolling walker  Gait: one side step at EOB w RW, Min-Mod A for balance, fatigued quickly.   Balance: SBA for static balance, Min-Mod A for dynamic sitting balance.       AM-PAC 6 CLICK MOBILITY  Total Score:11     Patient left supine with all lines intact and call button in reach.    GOALS:   Multidisciplinary Problems       Physical Therapy Goals          Problem: Physical Therapy    Goal Priority Disciplines Outcome Goal Variances Interventions   Physical Therapy Goal     PT, PT/OT Ongoing, Progressing     Description: Goals to be met by: 2022    Patient will increase functional independence with mobility by performin. Supine to sit with Contact Guard Assistance  2. Sit to stand transfer with Contact Guard Assistance  3. Gait  x 200 feet with Contact Guard Assistance using Rolling Walker.                          History:     Past Medical History:   Diagnosis Date    Common bile duct dilatation     Epigastric abdominal pain     Gastric ulcer, acute with hemorrhage and perforation     PUD (peptic ulcer disease)        Past Surgical History:   Procedure Laterality Date    APPENDECTOMY       SECTION, CLASSIC      CHOLECYSTECTOMY      Distal gastrectomy      GASTRECTOMY      HYSTERECTOMY      Rectovaginal fistula repair       TONSILLECTOMY      VAGOTOMY AND PYLOROPLASTY         Time Tracking:     PT Received On: 10/07/22  PT Start Time: 1327     PT Stop Time: 1350  PT Total Time (min): 23 min     Billable Minutes: Evaluation 23      10/07/2022

## 2022-10-07 NOTE — H&P
"[facility] Hospital Medicine   History & Physical Note      Patient Name: Nimo Dill  : 1955  MRN: [id number]  PCP: NELI Hodges  Admitting Service: Hospital Medicine  Attending Physician: Timothy Shipley MD  Admission Date: 10/6/2022 - IP- Inpatient   Length of Stay: 1  History source: EMR, patient and/or patient's family  Face-to-Face encounter date: 10/07/2022  Code status: Full    Chief Complaint   Dizziness (Pt to ER via POV for dizziness.  Started 2 weeks ago.  Was seen in [place] and had MRI.  States also weak and decreased appetite.  Pt states off balance and falling a lot.  Pt states missed the first appt and has not called them back)      History of Present Illness   Mrs. Dill is a 65 yo female with pmhx tobacco use, PUD/gastric perforation s/p gastrectomy in 2018, recurrent falls ongoing for the past year and 60 lbs unintentional weight loss. She is not a good historian. She underwent an MRI Brain w/o on 22 due to recurrent falls with results showing old cerebellar infarcts and an old right centrum semi oval infarct with chronic age related volume loss and chronic microvascular disease. She states "my  couldn't take it anymore so he sent me here". She states she has been falling more frequently over the past 2 weeks, also reports subjective fever, cough, dysuria, and intermittent episodes of chest pain however there is none present today.  She has urinary incontinence and wears a diaper.     Initial ED VS: Temperature 97.9°, heart rate 103, respirations 20, /71, oxygenation 97% room air.  CT head negative for acute intracranial finding.  Labs remarkable for leukocytosis of 18,600 with a left shift, hemoglobin 9.7, hematocrit 32.5, troponin 0.347 . EKG BRENNA with non-specific ST changes, right atrial enlargement..  UA positive for UTI., BUN 31.5. Pt is being admitted to . [facility] consulted, will follow.     Patient seen and examined this " evening, she is a poor historian.  She denies history of CAD/AMI.  She reports dark tarry stools but unable to tell me for how long. No NSAID use, hx of PUD with gastric ulcer perf in  past.  She reports multiple recurrent falls over the past year however they have worsened over the past few weeks and does state that at times she loses consciousness with her falls She has never had routine colonoscopy screening. I found MRI results after I saw the patient so I was not able to tell her that she had old strokes.    ROS   Except as documented, all other systems reviewed and negative     Past Medical History   Tobacco Use  Recurrent Falls and Syncopal Events  CVAs (cerebellar/right centrum semi ovale)  PUD with gastric ulcer perforation      Past Surgical History     Past Surgical History:   Procedure Laterality Date    APPENDECTOMY       SECTION, CLASSIC      CHOLECYSTECTOMY      Distal gastrectomy      GASTRECTOMY      HYSTERECTOMY      Rectovaginal fistula repair      TONSILLECTOMY      VAGOTOMY AND PYLOROPLASTY         Social History   Smokes half a pack to 1 pack a day  Denies illicit drug use or alcohol use.    Family History   Reviewed and negative    Allergies   Penicillins    Home Medications     Prior to Admission medications    Medication Sig Start Date End Date Taking? Authorizing Provider   alprazolam (XANAX) 0.5 MG tablet Take 0.5 mg by mouth 3 (three) times daily as needed.     Historical Provider   ARIPiprazole (ABILIFY) 10 MG Tab Take 10 mg by mouth once daily. 22   Historical Provider   cyproheptadine (PERIACTIN) 4 mg tablet  5/10/22   Historical Provider   HYDROcodone-acetaminophen (NORCO) 5-325 mg per tablet Take 1 tablet by mouth every 6 (six) hours as needed. 22   Historical Provider   ketoconazole (NIZORAL) 2 % cream Apply topically once daily. for 7 days 6/15/22 6/22/22  ARIANA Aquino   naproxen (NAPROSYN) 500 MG tablet TAKE 1 TABLET BY MOUTH WITH FOOD OR MILK AS NEEDED  EVERY 12 HOURS AFTER COMPLETING NORCO 5/23/22   Historical Provider   omeprazole (PRILOSEC) 40 MG capsule Take 1 capsule (40 mg total) by mouth 2 (two) times daily before meals. 4/23/13 4/23/14  Néstor Jimenes MD   pantoprazole (PROTONIX) 40 MG tablet Take 40 mg by mouth once daily. 6/3/22   Historical Provider   paroxetine (PAXIL) 10 MG tablet Take 10 mg by mouth once daily. 7/7/21   Historical Provider   paroxetine (PAXIL) 40 MG tablet Take 40 mg by mouth once daily. 5/23/22   Historical Provider   promethazine (PHENERGAN) 25 MG tablet Take 1 tablet (25 mg total) by mouth every 6 (six) hours as needed for Nausea. 9/24/13   Nakul Strong MD   sucralfate (CARAFATE) 1 gram tablet Take 1 g by mouth 4 (four) times daily.    Historical Provider   varenicline (CHANTIX ROSA) 0.5 (11)-1 (42) mg tablet Take one 0.5mg tablet by mouth once daily for 3 days, then increase to one 0.5mg tablet twice daily for 4 days, then increase to one 1mg tablet twice daily. 9/30/13   Virgil Herrera Jr., MD   varenicline (CHANTIX) 1 mg Tab Take 1 tablet (1 mg total) by mouth 2 (two) times daily. 9/30/13   Virgil Herrera Jr., MD   zolpidem (AMBIEN) 10 mg Tab Take 10 mg by mouth nightly as needed. 6/29/22   Historical Provider        Inpatient Medications   Scheduled Meds   azithromycin (ZITHROMAX) 500 mg IVPB  500 mg Intravenous Q24H    cefTRIAXone (ROCEPHIN) IVPB  1 g Intravenous Q24H    ferric gluconate (FERRLECIT) IVPB  125 mg Intravenous Daily    multivitamin  1 tablet Oral Daily    nicotine  1 patch Transdermal Daily    pantoprazole  40 mg Intravenous BID     Continuous Infusions   lactated ringers 125 mL/hr at 10/07/22 0049     PRN Meds  albuterol-ipratropium, zinc oxide-cod liver oil    Physical Exam   Vital Signs  Temp:  [97.5 °F (36.4 °C)-98.4 °F (36.9 °C)]   Pulse:  []   Resp:  [11-22]   BP: (114-135)/(49-81)   SpO2:  [97 %-99 %]    General:  Cachectic female in no acute distress,  HEENT: NC/AT, edentulous, tongue  red with white bumps in the back.  Neck:  No JVD  Chest: CTABL  CVS: Regular rhythm. Normal S1/S2.  Abdomen: nondistended, normoactive BS, soft and non-tender.  MSK: No obvious deformity or joint swelling diffuse muscleatrophy  Skin: multiple bruises to BUE  Neuro: AAOx3, speech slow but appropriate,  motor strength 5/5 BLE and BUE, gait not assessed, Finger to nose test WNL, gait not assessed.   Psych: Cooperative    Labs     Recent Labs     10/06/22  1841   WBC 18.6*   RBC 3.85*   HGB 9.7*   HCT 32.5*   MCV 84.4   MCH 25.2*   MCHC 29.8*   RDW 17.9*           Recent Labs     10/06/22  1841      K 4.0   CHLORIDE 109*   CO2 21*   BUN 31.5*   CREATININE 0.74   GLUCOSE 80*   CALCIUM 8.5   MG 1.70   ALBUMIN 2.0*   GLOBULIN 4.3*   ALKPHOS 195*   ALT 26   AST 20   BILITOT 0.8     Recent Labs     10/06/22  1841 10/07/22  0000   TROPONINI 0.347* 0.370*          Microbiology Results (last 7 days)       Procedure Component Value Units Date/Time    Urine culture [[id number]] Collected: 10/06/22 2146    Order Status: Sent Specimen: Urine Updated: 10/06/22 2249    Blood culture #1 **CANNOT BE ORDERED STAT** [[id number]] Collected: 10/06/22 2129    Order Status: Resulted Specimen: Blood Updated: 10/06/22 2147    Blood culture #2 **CANNOT BE ORDERED STAT** [[id number]] Collected: 10/06/22 2129    Order Status: Resulted Specimen: Blood Updated: 10/06/22 2147           Imaging     CT Head Without Contrast   Final Result      No acute intracranial findings.         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 IV Rocephin 1gm Q24H  - Telemetry. Trend Troponin. Echocardiogram. CUS.  - Cardiology consulted from ED.   - Obtain CTA Chest to r/o PE/malignancy  - MRI Brain given worsening ataxia and more frequent falls for past 2 weeks. Neuro checks Q4H. Check lipids, A1c. Will initiate statin therapy, ASA 81 mg daily upon discharge. Consider Neuro consult pending imaging results.  - Iron Profile, B12, Folate, hemocult. Pt endorses black tarry stool  - Protonix 80mg IV NOW, then 40mg IV BID. NPO. GI consult.   No need for blood transfusion at this time. Will monitor closely and transfuse accordingly.  - Consult PT/OT  for mobility. Fall precautions.   VTE Prophylaxis: Fauzia    I, Dorothy Small, EILEEN have discussed this patients case with Dr. Shipley agrees with the diagnosis and treatment plan.  --------------------------------------    Timothy Shipley MD  I performed the substantive portion of this visit. I had a face-to-face time with the patient on [10/7/2022]. I reviewed and agree with the nurse practitioner's history, physical exam and plan of care.      History:  66-year-old smoker presents to the ED after multiple falls, and significant weight loss, CT head show evidence of old cerebellar infarcts, chest x-ray notable for right middle to lower lobe pneumonia, urinalysis consistent with UTI.  Labs notable for leukocytosis, acute on chronic anemia and elevated troponin.  EKG showed no new ST T-wave changes.  She reports black stool but denies hematochezia.    Physical exam:  Crackle and wheezing bilaterally worse on the right, no pedal edema, multiple skin ecchymoses and bruises.    Medical decision making:  Continue ceftriaxone and azithromycin which would cover both pneumonia and UTI, will obtain CTA chest to further evaluate lung parenchyma for masses and nodules as well as rule out PE, MRI brain and echo bubble study.  Continue to trend troponin until peak.  Will  hold starting aspirin or anticoagulation at this time given concern for GI bleed, obtain stool occult blood start IV PPI.      Critical care time:  35 minute  Critical care diagnosis:  Sepsis secondary to UTI pneumonia      EXTENDED CARE 35 MINUTES/  DR BABCOCK/HIM  CHART REVIEWED AND PT EXAMINED. NEURO/CARDIOLOGY/GI CONSULTED  CONTINUE PRESENT ABX'S. WILL GET LABS FOR AM  NO BM. REQUESTING PAIN CONTROL FOR GENERALIZED PAIN  WILL ORDER ULTRAM 50 MGS PO Q 6 HOURS PRN

## 2022-10-07 NOTE — PROGRESS NOTES
Inpatient Nutrition Assessment    Admit Date: 10/6/2022   Total duration of encounter: 1 day     Nutrition Recommendation/Prescription     Progress diet as medically feasible.    Goal diet: heart healthy.     Add prosource TID. Provides 60 kcals and 15 g protein per serving     Monitor po intake, weight, labs    Consider appetite stimulant if no improvement in appetite.     Communication of Recommendations: reviewed with physician and reviewed with patient/caregiver    Nutrition Assessment     Malnutrition Assessment/Nutrition-Focused Physical Exam    Malnutrition in the context of acute illness or injury  Degree of Malnutrition: severe malnutrition  Energy Intake: </= 50% of estimated energy requirement for >/= 5 days  Interpretation of Weight Loss: >2% in 1 week  Body Fat:moderate depletion  Area of Body Fat Loss: orbital region  and upper arm region - triceps / biceps  Muscle Mass Loss: moderate depletion  Area of Muscle Mass Loss: temple region - temporalis muscle and clavicle bone region - pectoralis major, deltoid, trapezius muscles  Fluid Accumulation: unable to obtain  Edema: unable to obtain   Reduced  Strength: unable to obtain  A minimum of two characteristics is recommended for diagnosis of either severe or non-severe malnutrition.    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)  Normocytic Anemia with reports of Melena  Severe PCM with 60 # weight loss  Tobacco Use Disorder    Relevant Medical History:   Tobacco Use  Recurrent Falls and Syncopal Events  CVAs (cerebellar/right centrum semi ovale)  PUD with gastric ulcer perforation    Nutrition-Related Medications: LS @125 ml/hr, Azithromycin, Rocephin, MVI, Protonix, Nicotine, KCO3, Ferrlecit  Calorie Containing IV Medications: no significant kcals from medications  "at this time    Nutrition-Related Labs:  10/7: K 3.3, Cl 111, BUN 23.2, Ca 8.3, .9    Diet/PN Order: Diet clear liquid  Oral Supplement Order: none at this time  Tube Feeding Order: none at this time  Appetite/Oral Intake: poor/25-50% of meals  Factors Affecting Nutritional Intake: decreased appetite  Food/Judaism/Cultural Preferences: none reported    Skin Integrity: skin tear, bruised (ecchymotic), rash  Wound(s):   partial thick tissue loss to perineum    Comments    10/7/22: Pt reports 10 lbs unintentional loss over 2 weeks, <50% po intake over that time, no GI complaints at the time of our visit, normally chews/swallows okay. Just progressed to clear liquids but had not yet received tray at the time of our visit. PO intake as tolerated. Agrees to prosource.     Anthropometrics    Height: 5' 4" (162.6 cm) Height Method: Stated  Last Weight: 44.6 kg (98 lb 5.2 oz) (10/06/22 2249) Weight Method: Standard Scale  BMI (Calculated): 16.9  BMI Classification: underweight (BMI less than 18.5)     Ideal Body Weight (IBW), Female: 120 lb     % Ideal Body Weight, Female (lb): 81.94 %                             Usual Weight Provided By: EMR weight history    Wt Readings from Last 5 Encounters:   10/06/22 44.6 kg (98 lb 5.2 oz)   06/15/22 46.4 kg (102 lb 3.2 oz)   11/12/21 46.5 kg (102 lb 8.2 oz)   09/30/13 53.6 kg (118 lb 3.2 oz)   09/24/13 52.2 kg (115 lb)     Weight Change(s) Since Admission:  Admit Weight: 44.6 kg (98 lb 5.2 oz) (10/06/22 2249)      Estimated Needs        2243-3380 kcals (35-40 kcals/kg CBW)    67-90 g (1.5-2.0 g/kg CBW)    9101-6003 ml (1 ml/kcal)         Temp: 98.3 °F (36.8 °C)       Enteral Nutrition    Patient not receiving enteral nutrition at this time.    Parenteral Nutrition    Patient not receiving parenteral nutrition support at this time.    Evaluation of Received Nutrient Intake    Calories: not meeting estimated needs  Protein: not meeting estimated needs    Patient " Education    Not applicable.    Nutrition Diagnosis     PES: Malnutrition related to energy intake < energy demand  as evidenced by unintentional weight loss, poor po intake, muscle and fat wasting. (new)    Interventions/Goals     Intervention(s): modified composition of meals/snacks, commercial beverage, multivitamin/mineral supplement therapy, prescription medication, and collaboration with other providers  Goal: Meet greater than 75% of nutritional needs by follow-up. (new)    Monitoring & Evaluation     Dietitian will monitor food and beverage intake, weight, and electrolyte/renal panel.  Nutrition Risk/Follow-Up: high (follow-up in 1-4 days)

## 2022-10-07 NOTE — ASSESSMENT & PLAN NOTE
Stroke - bilateral infarcts    Continue stroke workup  Continue Atorvastatin 40mg daily  Add ASA 325mg daily  PT/OT/ST to evaluate      Antithrombotics for secondary stroke prevention: Antiplatelets: Aspirin: 325 mg daily    Statins for secondary stroke prevention and hyperlipidemia, if present: Statins: Atorvastatin- 40 mg daily    Aggressive risk factor modification: Smoking, HLD     Rehab efforts: The patient has been evaluated by a stroke team provider and the therapy needs have been fully considered based off the presenting complaints and exam findings. The following therapy evaluations are needed: PT evaluate and treat, OT evaluate and treat, SLP evaluate and treat    Diagnostics ordered/pending: CTA Head to assess vasculature , CTA Neck/Arch to assess vasculature  Stroke workup:  -MRI brain: Several acute pontine and infratentorial supratentorial nonhemorrhagic lacunar infarct  -ECHO: completed, interpretation pending  -CUS: negative  -A1c: 5.9  -TSH: 3.2412  -CRP: 208.90  -ESR: 73  -not on antiplatelet, anticoagulation, or statin therapy at home    VTE prophylaxis: Mechanical prophylaxis: Place SCDs    BP parameters: Infarct: normalize blood pressure

## 2022-10-08 LAB
ABO + RH BLD: NORMAL
ABO + RH BLD: NORMAL
ALBUMIN SERPL-MCNC: 1.6 GM/DL (ref 3.4–4.8)
ALBUMIN/GLOB SERPL: 0.4 RATIO (ref 1.1–2)
ALP SERPL-CCNC: 150 UNIT/L (ref 40–150)
ALT SERPL-CCNC: 19 UNIT/L (ref 0–55)
AST SERPL-CCNC: 16 UNIT/L (ref 5–34)
BASOPHILS # BLD AUTO: 0.03 X10(3)/MCL (ref 0–0.2)
BASOPHILS NFR BLD AUTO: 0.2 %
BILIRUBIN DIRECT+TOT PNL SERPL-MCNC: 0.6 MG/DL
BLD PROD TYP BPU: NORMAL
BLD PROD TYP BPU: NORMAL
BLOOD UNIT EXPIRATION DATE: NORMAL
BLOOD UNIT EXPIRATION DATE: NORMAL
BLOOD UNIT TYPE CODE: 9500
BLOOD UNIT TYPE CODE: 9500
BUN SERPL-MCNC: 11.6 MG/DL (ref 9.8–20.1)
CALCIUM SERPL-MCNC: 7.9 MG/DL (ref 8.4–10.2)
CHLORIDE SERPL-SCNC: 109 MMOL/L (ref 98–107)
CHOLEST SERPL-MCNC: 91 MG/DL
CHOLEST/HDLC SERPL: 3 {RATIO} (ref 0–5)
CO2 SERPL-SCNC: 19 MMOL/L (ref 23–31)
CREAT SERPL-MCNC: 0.66 MG/DL (ref 0.55–1.02)
CROSSMATCH INTERPRETATION: NORMAL
CROSSMATCH INTERPRETATION: NORMAL
DISPENSE STATUS: NORMAL
DISPENSE STATUS: NORMAL
EOSINOPHIL # BLD AUTO: 0.04 X10(3)/MCL (ref 0–0.9)
EOSINOPHIL NFR BLD AUTO: 0.3 %
ERYTHROCYTE [DISTWIDTH] IN BLOOD BY AUTOMATED COUNT: 17.8 % (ref 11.5–17)
GFR SERPLBLD CREATININE-BSD FMLA CKD-EPI: >60 MLS/MIN/1.73/M2
GLOBULIN SER-MCNC: 3.7 GM/DL (ref 2.4–3.5)
GLUCOSE SERPL-MCNC: 70 MG/DL (ref 82–115)
GROUP & RH: NORMAL
HCT VFR BLD AUTO: 26.3 % (ref 37–47)
HDLC SERPL-MCNC: 36 MG/DL (ref 35–60)
HGB BLD-MCNC: 7.7 GM/DL (ref 12–16)
IMM GRANULOCYTES # BLD AUTO: 0.29 X10(3)/MCL (ref 0–0.04)
IMM GRANULOCYTES NFR BLD AUTO: 2 %
INDIRECT COOMBS GEL: NORMAL
LDLC SERPL CALC-MCNC: 38 MG/DL (ref 50–140)
LYMPHOCYTES # BLD AUTO: 1.04 X10(3)/MCL (ref 0.6–4.6)
LYMPHOCYTES NFR BLD AUTO: 7.1 %
MAGNESIUM SERPL-MCNC: 1.3 MG/DL (ref 1.6–2.6)
MCH RBC QN AUTO: 24.6 PG (ref 27–31)
MCHC RBC AUTO-ENTMCNC: 29.3 MG/DL (ref 33–36)
MCV RBC AUTO: 84 FL (ref 80–94)
MONOCYTES # BLD AUTO: 0.92 X10(3)/MCL (ref 0.1–1.3)
MONOCYTES NFR BLD AUTO: 6.3 %
NEUTROPHILS # BLD AUTO: 12.3 X10(3)/MCL (ref 2.1–9.2)
NEUTROPHILS NFR BLD AUTO: 84.1 %
NRBC BLD AUTO-RTO: 0.1 %
PLATELET # BLD AUTO: 193 X10(3)/MCL (ref 130–400)
PMV BLD AUTO: 11.7 FL (ref 7.4–10.4)
POTASSIUM SERPL-SCNC: 3.9 MMOL/L (ref 3.5–5.1)
PROT SERPL-MCNC: 5.3 GM/DL (ref 5.8–7.6)
RBC # BLD AUTO: 3.13 X10(6)/MCL (ref 4.2–5.4)
SODIUM SERPL-SCNC: 137 MMOL/L (ref 136–145)
TRIGL SERPL-MCNC: 85 MG/DL (ref 37–140)
UNIT NUMBER: NORMAL
UNIT NUMBER: NORMAL
VLDLC SERPL CALC-MCNC: 17 MG/DL
WBC # SPEC AUTO: 14.6 X10(3)/MCL (ref 4.5–11.5)

## 2022-10-08 PROCEDURE — A9698 NON-RAD CONTRAST MATERIALNOC: HCPCS | Performed by: INTERNAL MEDICINE

## 2022-10-08 PROCEDURE — 36415 COLL VENOUS BLD VENIPUNCTURE: CPT | Performed by: INTERNAL MEDICINE

## 2022-10-08 PROCEDURE — 63600175 PHARM REV CODE 636 W HCPCS: Performed by: NURSE PRACTITIONER

## 2022-10-08 PROCEDURE — 92610 EVALUATE SWALLOWING FUNCTION: CPT

## 2022-10-08 PROCEDURE — 99232 SBSQ HOSP IP/OBS MODERATE 35: CPT | Mod: ,,, | Performed by: PSYCHIATRY & NEUROLOGY

## 2022-10-08 PROCEDURE — 86923 COMPATIBILITY TEST ELECTRIC: CPT | Mod: 91 | Performed by: INTERNAL MEDICINE

## 2022-10-08 PROCEDURE — A4216 STERILE WATER/SALINE, 10 ML: HCPCS | Performed by: INTERNAL MEDICINE

## 2022-10-08 PROCEDURE — 99232 PR SUBSEQUENT HOSPITAL CARE,LEVL II: ICD-10-PCS | Mod: ,,, | Performed by: PSYCHIATRY & NEUROLOGY

## 2022-10-08 PROCEDURE — 86901 BLOOD TYPING SEROLOGIC RH(D): CPT | Performed by: INTERNAL MEDICINE

## 2022-10-08 PROCEDURE — 80053 COMPREHEN METABOLIC PANEL: CPT | Performed by: INTERNAL MEDICINE

## 2022-10-08 PROCEDURE — 36410 VNPNXR 3YR/> PHY/QHP DX/THER: CPT

## 2022-10-08 PROCEDURE — 63600175 PHARM REV CODE 636 W HCPCS: Performed by: INTERNAL MEDICINE

## 2022-10-08 PROCEDURE — 25000003 PHARM REV CODE 250: Performed by: INTERNAL MEDICINE

## 2022-10-08 PROCEDURE — C9113 INJ PANTOPRAZOLE SODIUM, VIA: HCPCS | Performed by: NURSE PRACTITIONER

## 2022-10-08 PROCEDURE — 25000003 PHARM REV CODE 250: Performed by: NURSE PRACTITIONER

## 2022-10-08 PROCEDURE — 85025 COMPLETE CBC W/AUTO DIFF WBC: CPT | Performed by: INTERNAL MEDICINE

## 2022-10-08 PROCEDURE — 92611 MOTION FLUOROSCOPY/SWALLOW: CPT

## 2022-10-08 PROCEDURE — 36430 TRANSFUSION BLD/BLD COMPNT: CPT

## 2022-10-08 PROCEDURE — 83735 ASSAY OF MAGNESIUM: CPT | Performed by: INTERNAL MEDICINE

## 2022-10-08 PROCEDURE — 21400001 HC TELEMETRY ROOM

## 2022-10-08 PROCEDURE — P9016 RBC LEUKOCYTES REDUCED: HCPCS | Performed by: INTERNAL MEDICINE

## 2022-10-08 PROCEDURE — 25500020 PHARM REV CODE 255: Performed by: INTERNAL MEDICINE

## 2022-10-08 PROCEDURE — C1751 CATH, INF, PER/CENT/MIDLINE: HCPCS

## 2022-10-08 RX ORDER — FUROSEMIDE 10 MG/ML
20 INJECTION INTRAMUSCULAR; INTRAVENOUS ONCE
Status: COMPLETED | OUTPATIENT
Start: 2022-10-08 | End: 2022-10-09

## 2022-10-08 RX ORDER — HYDROCODONE BITARTRATE AND ACETAMINOPHEN 500; 5 MG/1; MG/1
TABLET ORAL
Status: DISCONTINUED | OUTPATIENT
Start: 2022-10-08 | End: 2022-10-11 | Stop reason: HOSPADM

## 2022-10-08 RX ORDER — SODIUM CHLORIDE 0.9 % (FLUSH) 0.9 %
10 SYRINGE (ML) INJECTION
Status: DISCONTINUED | OUTPATIENT
Start: 2022-10-08 | End: 2022-10-11 | Stop reason: HOSPADM

## 2022-10-08 RX ORDER — SODIUM CHLORIDE 0.9 % (FLUSH) 0.9 %
10 SYRINGE (ML) INJECTION EVERY 6 HOURS
Status: DISCONTINUED | OUTPATIENT
Start: 2022-10-08 | End: 2022-10-11 | Stop reason: HOSPADM

## 2022-10-08 RX ADMIN — ATORVASTATIN CALCIUM 40 MG: 40 TABLET, FILM COATED ORAL at 11:10

## 2022-10-08 RX ADMIN — AZITHROMYCIN MONOHYDRATE 500 MG: 500 INJECTION, POWDER, LYOPHILIZED, FOR SOLUTION INTRAVENOUS at 09:10

## 2022-10-08 RX ADMIN — BARIUM SULFATE 15 ML: 0.81 POWDER, FOR SUSPENSION ORAL at 03:10

## 2022-10-08 RX ADMIN — SODIUM CHLORIDE, PRESERVATIVE FREE 10 ML: 5 INJECTION INTRAVENOUS at 08:10

## 2022-10-08 RX ADMIN — THERA TABS 1 TABLET: TAB at 11:10

## 2022-10-08 RX ADMIN — TRAMADOL HYDROCHLORIDE 50 MG: 50 TABLET, COATED ORAL at 08:10

## 2022-10-08 RX ADMIN — CEFTRIAXONE SODIUM 1 G: 1 INJECTION, POWDER, FOR SOLUTION INTRAMUSCULAR; INTRAVENOUS at 01:10

## 2022-10-08 RX ADMIN — SODIUM CHLORIDE 125 MG: 9 INJECTION, SOLUTION INTRAVENOUS at 11:10

## 2022-10-08 RX ADMIN — MELATONIN TAB 3 MG 6 MG: 3 TAB at 08:10

## 2022-10-08 RX ADMIN — PANTOPRAZOLE SODIUM 40 MG: 40 INJECTION, POWDER, FOR SOLUTION INTRAVENOUS at 08:10

## 2022-10-08 RX ADMIN — TRAMADOL HYDROCHLORIDE 50 MG: 50 TABLET, COATED ORAL at 04:10

## 2022-10-08 RX ADMIN — PANTOPRAZOLE SODIUM 40 MG: 40 INJECTION, POWDER, FOR SOLUTION INTRAVENOUS at 11:10

## 2022-10-08 RX ADMIN — ZINC OXIDE: 400 OINTMENT TOPICAL at 08:10

## 2022-10-08 NOTE — PROCEDURES
"Nimo Dill is a 66 y.o. female patient.    Temp: 98.2 °F (36.8 °C) (10/08/22 1255)  Pulse: 85 (10/08/22 1255)  Resp: 18 (10/08/22 1255)  BP: 129/70 (10/08/22 1255)  SpO2: (!) 92 % (10/08/22 1255)  Weight: 44.6 kg (98 lb 5.2 oz) (10/06/22 2249)  Height: 5' 4" (162.6 cm) (10/06/22 2249)    PICC  Indications: med administration  Preparation: skin prepped with ChloraPrep  Skin prep agent dried: skin prep agent completely dried prior to procedure  Sterile barriers: all five maximum sterile barriers used - cap, mask, sterile gown, sterile gloves, and large sterile sheet  Hand hygiene: hand hygiene performed prior to central venous catheter insertion  Location details: left basilic  Catheter type: single lumen  Catheter size: 4 Fr  Catheter Length: 9cm    Ultrasound guidance: yes  Needle advanced into vessel with real time Ultrasound guidance.  Guidewire confirmed in vessel.  Sterile sheath used.    Midline   Arm circumference 24 cm      Name Joseph eid RN  10/8/2022    "

## 2022-10-08 NOTE — PT/OT/SLP EVAL
"Speech Language Pathology Department  Clinical Swallow Evaluation    Patient Name:  Nimo Dill   MRN:  [id number]  Admitting Diagnosis: CVA     Recommendations:     General recommendations:  Modified Barium Swallow Study  Diet recommendations:  NPO, Liquid Diet Level: NPO   Swallow strategies/precautions: N/A   Precautions: Standard, aspiration, NPO    History:     Past Medical History:   Diagnosis Date    Common bile duct dilatation     Epigastric abdominal pain     Gastric ulcer, acute with hemorrhage and perforation     PUD (peptic ulcer disease)        Past Surgical History:   Procedure Laterality Date    APPENDECTOMY       SECTION, CLASSIC      CHOLECYSTECTOMY      Distal gastrectomy      GASTRECTOMY      HYSTERECTOMY      Rectovaginal fistula repair      TONSILLECTOMY      VAGOTOMY AND PYLOROPLASTY         Prior Intubation HX:  N/A     Chest X-Rays:  patchy consolidation in the right greater than left lung bases (10/6/2022)    Home Diet: Regular and thin liquids  Current Method of Nutrition: NPO    Patient complaint: "I want to eat"    Subjective     Patient awake, alert, and anxious.    Patient goals: "To eat again"     Pain/Comfort: Pain Rating 1: 0/10    Respiratory Status: Room air     Objective:     Oral Musculature Evaluation  Oral Musculature: general weakness  Dentition: present and adequate  Secretion Management: adequate  Mucosal Quality: good  Mandibular Strength and Mobility: WFL  Oral Labial Strength and Mobility: WFL  Lingual Strength and Mobility: impaired strength  Volitional Cough: present  Volitional Swallow: painful    Consistency Fed By Oral Symptoms Pharyngeal Symptoms   Thin liquid by cup Self None None   Puree Self None None   Chewable solid Self None None                             Assessment:     Orders received, chart reviewed, and nursing consulted. Pt with history of CVA, dysphagia with current complaints of painful swallowing. GI has been consulted with plans of upper " "endoscopy pending.  Pt presented with no clinical signs/sx of aspiration with any consistency trailed  Pt continued with c/o painful swallowing with ice water. Recent xray on10/6/2022 revealed, "patchy consolidation in the right greater than left lung bases" warranting MBS to comprehensively asesess swallow function/safety.    ST awaiting neuro and GI clearance to complete MBS, and will proceed after clearance is obtained.     Goals:   Multidisciplinary Problems       SLP Goals          Problem: SLP    Goal Priority Disciplines Outcome   SLP Goal     SLP Ongoing, Progressing   Description: Long Term Goal:  Pt will tolerate least restrictive diet with no clinical signs/sx of aspiration       Short Term Goals:  1. Pt will complete MBS to assess swallow function/safety for least restrictive diet           POC initiated and goals created  Bessie Alfaro CCC SLP                        Plan:     Patient to be seen:  daily   Plan of Care expires:  11/08/22  Plan of Care reviewed with:  patient, spouse   SLP Follow-Up:  Yes      Time Tracking:     SLP Treatment Date:    10/08/2022  Speech Start Time:   0950  Speech Stop Time:  1005     Speech Total Time (min):   15 minutes     Billable minutes:  Swallow and Oral Function Evaluation, 15 minutes      10/08/2022            "

## 2022-10-08 NOTE — PROGRESS NOTES
S:  Gaining some strength  Still generally weak  EGD Monday  Needs midline access    O  Vitals:    10/08/22 1255   BP: 129/70   Pulse: 85   Resp: 18   Temp: 98.2 °F (36.8 °C)     Mental Status: Alert and oriented x3. Language is fluent with good comprehension.    Cranial Nerve: Pupils are equal, round, and reactive to light. Visual fields are intact to confrontation. Normal fundi. Ocular movements are intact. Face is symmetric at rest and with activation with intact sensation throughout. Hearing intact to finger rub bilaterally. Muscles of tongue and palate activate symmetrically. No dysarthria. Strength is full in sternocleidomastoid and trapezius bilaterally.    Motor: Muscle bulk and tone are normal. Strength is 5/5 in all four extremities both proximally and distally. Intact fine motor movements bilaterally. There is no pronator drift or satelliting on arm roll.    Sensory: Sensation is intact to light touch, pinprick, vibration, and proprioception throughout. Romberg is negative.    Reflexes: 2+ and symmetric at the biceps, triceps, brachioradialis, patella, and Achilles bilaterally. Plantar response is flexor bilaterally.    Coordination: No dysmetria on finger-nose-finger or heel-knee-shin. Normal rapid alternating movements. Fast finger tapping with normal amplitude and speed.    Gait: Narrow based with normal stride length and good arm swing bilaterally. Able to walk on heels, toes, and in tandem.      A/p  Stroke, multiterritory embolic  I think she needs anticoagulation; [facility] starting xarelto 10 mg/day after the EGD is complete    She is cleared for EGD from my standpoint    [facility]    Please

## 2022-10-08 NOTE — PLAN OF CARE
Problem: Adult Inpatient Plan of Care  Goal: Plan of Care Review  Outcome: Ongoing, Progressing  Goal: Patient-Specific Goal (Individualized)  Outcome: Ongoing, Progressing  Goal: Absence of Hospital-Acquired Illness or Injury  Outcome: Ongoing, Progressing  Goal: Optimal Comfort and Wellbeing  Outcome: Ongoing, Progressing  Goal: Readiness for Transition of Care  Outcome: Ongoing, Progressing     Problem: Fall Injury Risk  Goal: Absence of Fall and Fall-Related Injury  Outcome: Ongoing, Progressing     Problem: Skin Injury Risk Increased  Goal: Skin Health and Integrity  Outcome: Ongoing, Progressing     Problem: Impaired Wound Healing  Goal: Optimal Wound Healing  Outcome: Ongoing, Progressing     Problem: Adjustment to Illness (Stroke, Ischemic/Transient Ischemic Attack)  Goal: Optimal Coping  Outcome: Ongoing, Progressing     Problem: Bowel Elimination Impaired (Stroke, Ischemic/Transient Ischemic Attack)  Goal: Effective Bowel Elimination  Outcome: Ongoing, Progressing     Problem: Cerebral Tissue Perfusion (Stroke, Ischemic/Transient Ischemic Attack)  Goal: Optimal Cerebral Tissue Perfusion  Outcome: Ongoing, Progressing     Problem: Cognitive Impairment (Stroke, Ischemic/Transient Ischemic Attack)  Goal: Optimal Cognitive Function  Outcome: Ongoing, Progressing     Problem: Communication Impairment (Stroke, Ischemic/Transient Ischemic Attack)  Goal: Improved Communication Skills  Outcome: Ongoing, Progressing     Problem: Functional Ability Impaired (Stroke, Ischemic/Transient Ischemic Attack)  Goal: Optimal Functional Ability  Outcome: Ongoing, Progressing     Problem: Respiratory Compromise (Stroke, Ischemic/Transient Ischemic Attack)  Goal: Effective Oxygenation and Ventilation  Outcome: Ongoing, Progressing     Problem: Sensorimotor Impairment (Stroke, Ischemic/Transient Ischemic Attack)  Goal: Improved Sensorimotor Function  Outcome: Ongoing, Progressing     Problem: Swallowing Impairment (Stroke,  Ischemic/Transient Ischemic Attack)  Goal: Optimal Eating and Swallowing without Aspiration  Outcome: Ongoing, Progressing     Problem: Urinary Elimination Impaired (Stroke, Ischemic/Transient Ischemic Attack)  Goal: Effective Urinary Elimination  Outcome: Ongoing, Progressing

## 2022-10-08 NOTE — PROGRESS NOTES
"Gastroenterology Progress Note    Subjective/Interval History:    Mrs. Dill is a 65 yo female with pmhx tobacco use, PUD/gastric perforation s/p gastrectomy in 2018, recurrent falls ongoing for the past year and 60 lbs unintentional weight loss. She is not a good historian. She underwent an MRI Brain w/o on 9/13/22 due to recurrent falls with results showing old cerebellar infarcts and an old right centrum semi oval infarct with chronic age related volume loss and chronic microvascular disease. She states "my  couldn't take it anymore so he sent me here". She states she has been falling more frequently over the past 2 weeks, also reports subjective fever, cough, dysuria, and intermittent episodes of chest pain however there is none present today.  She has urinary incontinence and wears a diaper      10-8-22  Pt with no bleeding overnight  Hgb remains stable  No acute cahnges  No pain    ROS:    generalized weakness.    recurrent falls.    lightheadedness and dizziness.   Occasional shortness of breath.  No significant chest pain at this time.  History of dark stool reported in the last 4 weeks or so.  No hematochezia.  No hematemesis.  No significant nausea vomiting.  No fever or chills.       Vital Signs:  /69   Pulse 84   Temp 98.1 °F (36.7 °C) (Oral)   Resp 16   Ht 5' 4" (1.626 m)   Wt 44.6 kg (98 lb 5.2 oz)   SpO2 (!) 94%   Breastfeeding No   BMI 16.88 kg/m²   Body mass index is 16.88 kg/m².    Physical Exam:    HEENT examination:  Pale conjunctiva anicteric sclera   Neck:  Supple,   Chest:  Decreased breath sounds bilaterally.  Occasional bibasilar crackles.    Heart examination:  S1, S2 audible  Abdomen:  Bowel sounds positive.  Soft.  Nontender.  Extremities:  No significant edema.  She does have diffuse ecchymoses in bilateral upper and lower extremities     Labs:  Recent Results (from the past 48 hour(s))   Comprehensive metabolic panel    Collection Time: 10/06/22  6:41 PM   Result " Value Ref Range    Sodium Level 139 136 - 145 mmol/L    Potassium Level 4.0 3.5 - 5.1 mmol/L    Chloride 109 (H) 98 - 107 mmol/L    Carbon Dioxide 21 (L) 23 - 31 mmol/L    Glucose Level 80 (L) 82 - 115 mg/dL    Blood Urea Nitrogen 31.5 (H) 9.8 - 20.1 mg/dL    Creatinine 0.74 0.55 - 1.02 mg/dL    Calcium Level Total 8.5 8.4 - 10.2 mg/dL    Protein Total 6.3 5.8 - 7.6 gm/dL    Albumin Level 2.0 (L) 3.4 - 4.8 gm/dL    Globulin 4.3 (H) 2.4 - 3.5 gm/dL    Albumin/Globulin Ratio 0.5 (L) 1.1 - 2.0 ratio    Bilirubin Total 0.8 <=1.5 mg/dL    Alkaline Phosphatase 195 (H) 40 - 150 unit/L    Alanine Aminotransferase 26 0 - 55 unit/L    Aspartate Aminotransferase 20 5 - 34 unit/L    eGFR >60 mls/min/1.73/m2   Magnesium    Collection Time: 10/06/22  6:41 PM   Result Value Ref Range    Magnesium Level 1.70 1.60 - 2.60 mg/dL   Troponin I    Collection Time: 10/06/22  6:41 PM   Result Value Ref Range    Troponin-I 0.347 (H) 0.000 - 0.045 ng/mL   CBC with Differential    Collection Time: 10/06/22  6:41 PM   Result Value Ref Range    WBC 18.6 (H) 4.5 - 11.5 x10(3)/mcL    RBC 3.85 (L) 4.20 - 5.40 x10(6)/mcL    Hgb 9.7 (L) 12.0 - 16.0 gm/dL    Hct 32.5 (L) 37.0 - 47.0 %    MCV 84.4 80.0 - 94.0 fL    MCH 25.2 (L) 27.0 - 31.0 pg    MCHC 29.8 (L) 33.0 - 36.0 mg/dL    RDW 17.9 (H) 11.5 - 17.0 %    Platelet 133 130 - 400 x10(3)/mcL    MPV      Neut % 89.2 %    Lymph % 5.1 %    Mono % 3.5 %    Eos % 0.3 %    Basophil % 0.4 %    Lymph # 0.94 0.6 - 4.6 x10(3)/mcL    Neut # 16.6 (H) 2.1 - 9.2 x10(3)/mcL    Mono # 0.66 0.1 - 1.3 x10(3)/mcL    Eos # 0.06 0 - 0.9 x10(3)/mcL    Baso # 0.08 0 - 0.2 x10(3)/mcL    IG# 0.28 (H) 0 - 0.04 x10(3)/mcL    IG% 1.5 %    NRBC% 0.1 %   Blood culture #1 **CANNOT BE ORDERED STAT**    Collection Time: 10/06/22  9:29 PM    Specimen: Blood   Result Value Ref Range    CULTURE, BLOOD ([facility]) No Growth At 24 Hours    Blood culture #2 **CANNOT BE ORDERED STAT**    Collection Time: 10/06/22  9:29 PM    Specimen: Blood    Result Value Ref Range    CULTURE, BLOOD ([facility]) No Growth At 24 Hours    Urinalysis, Reflex to Urine Culture Urine, Clean Catch    Collection Time: 10/06/22  9:46 PM    Specimen: Urine   Result Value Ref Range    Color, UA Yellow Yellow, Colorless, Other, Clear    Appearance, UA Cloudy (A) Clear    Specific Gravity, UA 1.016 1.001 - 1.030    pH, UA 5.5 5.0, 5.5, 6.0, 6.5, 7.0, 7.5, 8.0, 8.5    Protein, UA 1+ (A) Negative mg/dL    Glucose, UA Negative Negative, Normal mg/dL    Ketones, UA Negative Negative mg/dL    Blood, UA 2+ (A) Negative unit/L    Bilirubin, UA Negative Negative mg/dL    Urobilinogen, UA 1.0 0.2, 1.0, Normal mg/dL    Nitrites, UA Positive (A) Negative    Leukocyte Esterase, UA 2+ (A) Negative unit/L   Urinalysis, Microscopic    Collection Time: 10/06/22  9:46 PM   Result Value Ref Range    RBC, UA 14 (H) <=5 /HPF    WBC, UA 30 (H) <=5 /HPF    Squamous Epithelial Cells, UA <5 <=5 /HPF    Bacteria, UA 4+ (A) None Seen, Rare, Occasional /HPF   Urine culture    Collection Time: 10/06/22  9:46 PM    Specimen: Urine   Result Value Ref Range    Urine Culture >/= 100,000 colonies/ml Gram-negative Rods (A)    Troponin I    Collection Time: 10/07/22 12:00 AM   Result Value Ref Range    Troponin-I 0.370 (H) 0.000 - 0.045 ng/mL   Iron and TIBC    Collection Time: 10/07/22 12:00 AM   Result Value Ref Range    Iron Binding Capacity Unsaturated 168 70 - 310 ug/dL    Iron Level 10 (L) 50 - 170 ug/dL    Transferrin 158 (L) 173 - 360 mg/dL    Iron Binding Capacity Total 178 (L) 250 - 450 ug/dL    Iron Saturation 6 (L) 20 - 50 %   Ferritin    Collection Time: 10/07/22 12:00 AM   Result Value Ref Range    Ferritin Level 184.35 4.63 - 204.00 ng/mL   Vitamin B12    Collection Time: 10/07/22 12:00 AM   Result Value Ref Range    Vitamin B12 Level >2,000 (H) 213 - 816 pg/mL   C-Reactive Protein    Collection Time: 10/07/22 12:00 AM   Result Value Ref Range    C-Reactive Protein 208.90 (H) <5.00 mg/L   TSH    Collection  Time: 10/07/22 12:00 AM   Result Value Ref Range    Thyroid Stimulating Hormone 3.2412 0.3500 - 4.9400 uIU/mL   Troponin I    Collection Time: 10/07/22  7:15 AM   Result Value Ref Range    Troponin-I 0.386 (H) 0.000 - 0.045 ng/mL   Basic Metabolic Panel    Collection Time: 10/07/22  7:15 AM   Result Value Ref Range    Sodium Level 140 136 - 145 mmol/L    Potassium Level 3.3 (L) 3.5 - 5.1 mmol/L    Chloride 111 (H) 98 - 107 mmol/L    Carbon Dioxide 20 (L) 23 - 31 mmol/L    Glucose Level 62 (L) 82 - 115 mg/dL    Blood Urea Nitrogen 23.2 (H) 9.8 - 20.1 mg/dL    Creatinine 0.78 0.55 - 1.02 mg/dL    BUN/Creatinine Ratio 30     Calcium Level Total 8.3 (L) 8.4 - 10.2 mg/dL    Anion Gap 9.0 mEq/L    eGFR >60 mls/min/1.73/m2   Phosphorus    Collection Time: 10/07/22  7:15 AM   Result Value Ref Range    Phosphorus Level 2.9 2.3 - 4.7 mg/dL   Magnesium    Collection Time: 10/07/22  7:15 AM   Result Value Ref Range    Magnesium Level 1.70 1.60 - 2.60 mg/dL   Lactic Acid, Plasma    Collection Time: 10/07/22  7:15 AM   Result Value Ref Range    Lactic Acid Level 2.2 0.5 - 2.2 mmol/L   Folate    Collection Time: 10/07/22  7:15 AM   Result Value Ref Range    Folate Level 8.9 7.0 - 31.4 ng/mL   D-Dimer, Quantitative    Collection Time: 10/07/22  7:15 AM   Result Value Ref Range    D-Dimer 2.59 (H) 0.00 - 0.50 ug/mL FEU   Hemoglobin A1C    Collection Time: 10/07/22  7:15 AM   Result Value Ref Range    Hemoglobin A1c 5.9 <=7.0 %    Estimated Average Glucose 122.6 mg/dL   CV Ultrasound Bilateral Doppler Carotid    Collection Time: 10/07/22  8:18 AM   Result Value Ref Range    Left ICA/CCA ratio 1.18     Right ICA/CCA ratio 0.91     Left Highest ICA 93.00     Left Highest CCA 80     Right Highest ICA 61.00     Right Highest CCA 73     Right Highest EDV 15.00     LT Highest EDV 56.00     Right CCA prox sys 73 cm/s    Right CCA prox cordero 16 cm/s    Right CCA dist sys 67 cm/s    Right CCA dist cordero 16 cm/s    Right ICA prox sys 46 cm/s     Right ICA prox cordero 12 cm/s    Right ICA mid sys 58 cm/s    Right ICA mid cordero 15 cm/s    Right ICA dist sys 61 cm/s    Right ICA dist cordero 13 cm/s    Right ECA sys 90 cm/s    Right vertebral sys 75 cm/s    Left CCA prox sys 80 cm/s    Left CCA prox cordero 13 cm/s    Left CCA dist sys 79 cm/s    Left CCA dist cordero 11 cm/s    Left ICA prox sys 62 cm/s    Left ICA prox cordero 12 cm/s    Left ICA mid sys 93 cm/s    Left ICA mid cordero 56 cm/s    Left ICA dist sys 77 cm/s    Left ICA dist cordero 29 cm/s    Left ECA sys 80 cm/s    Left vertebral sys 100 cm/s    Right vertebral cordero 14 cm/s    Right ECA cordero 7 cm/s    Left vertebral cordero 17 cm/s    Left ECA cordero 15 cm/s   Echo    Collection Time: 10/07/22  8:55 AM   Result Value Ref Range    BSA 1.42 m2    Right Atrial Pressure (from IVC) 3 mmHg    EF 57 %    Left Ventricular Outflow Tract Mean Velocity 0.67 cm/s    Left Ventricular Outflow Tract Mean Gradient 2.00 mmHg    PV PEAK VELOCITY 0.87 cm/s    LVIDd 4.27 3.5 - 6.0 cm    IVS 0.78 0.6 - 1.1 cm    Posterior Wall 0.90 0.6 - 1.1 cm    LVIDs 2.69 2.1 - 4.0 cm    FS 37 28 - 44 %    LV mass 111.08 g    LA size 2.90 cm    TAPSE 2.17 cm    Left Ventricle Relative Wall Thickness 0.42 cm    AV mean gradient 7 mmHg    AV valve area 1.67 cm2    AV Velocity Ratio 0.56     AV index (prosthetic) 0.53     MV mean gradient 3 mmHg    MV valve area p 1/2 method 5.64 cm2    MV valve area by continuity eq 1.91 cm2    E/A ratio 0.77     E wave deceleration time 156.00 msec    LVOT diameter 2.00 cm    LVOT area 3.1 cm2    LVOT peak martin 1.00 m/s    LVOT peak VTI 15.20 cm    Ao peak martin 1.79 m/s    Ao VTI 28.6 cm    Vn Nyquist 0.39 m/s    Radius 0.80 cm    Mr max martin 5.70 m/s    LVOT stroke volume 47.73 cm3    AV peak gradient 13 mmHg    MV peak gradient 5 mmHg    TV rest pulmonary artery pressure 35 mmHg    Vn Nyquist MS 0.39 m/s    MV Peak E Martin 0.88 m/s    MR PISA EROA 0.27 cm2    TR Max Martin 2.83 m/s    MV VTI 25.0 cm    MV stenosis pressure  1/2 time 39.00 ms    MV Peak A Martin 1.14 m/s    LV Systolic Volume 26.80 mL    LV Systolic Volume Index 18.5 mL/m2    LV Diastolic Volume 81.70 mL    LV Diastolic Volume Index 56.34 mL/m2    LV Mass Index 77 g/m2    Triscuspid Valve Regurgitation Peak Gradient 32 mmHg    LA Volume Index (Mod) 19.4 mL/m2    LA volume (mod) 28.10 cm3   CBC with Differential    Collection Time: 10/07/22 10:26 AM   Result Value Ref Range    WBC 15.1 (H) 4.5 - 11.5 x10(3)/mcL    RBC 3.35 (L) 4.20 - 5.40 x10(6)/mcL    Hgb 8.3 (L) 12.0 - 16.0 gm/dL    Hct 28.1 (L) 37.0 - 47.0 %    MCV 83.9 80.0 - 94.0 fL    MCH 24.8 (L) 27.0 - 31.0 pg    MCHC 29.5 (L) 33.0 - 36.0 mg/dL    RDW 17.8 (H) 11.5 - 17.0 %    Platelet 142 130 - 400 x10(3)/mcL    MPV 11.8 (H) 7.4 - 10.4 fL    Neut % 89.6 %    Lymph % 5.0 %    Mono % 3.9 %    Eos % 0.2 %    Basophil % 0.2 %    Lymph # 0.76 0.6 - 4.6 x10(3)/mcL    Neut # 13.5 (H) 2.1 - 9.2 x10(3)/mcL    Mono # 0.59 0.1 - 1.3 x10(3)/mcL    Eos # 0.03 0 - 0.9 x10(3)/mcL    Baso # 0.03 0 - 0.2 x10(3)/mcL    IG# 0.17 (H) 0 - 0.04 x10(3)/mcL    IG% 1.1 %    NRBC% 0.1 %   Sedimentation rate    Collection Time: 10/07/22 10:26 AM   Result Value Ref Range    Sed Rate 73 (H) 0 - 20 mm/hr   COVID/FLU A&B PCR    Collection Time: 10/07/22 10:55 AM   Result Value Ref Range    Influenza A PCR Not Detected Not Detected    Influenza B PCR Not Detected Not Detected    SARS-CoV-2 PCR Not Detected Not Detected   Troponin I    Collection Time: 10/07/22 12:26 PM   Result Value Ref Range    Troponin-I 0.361 (H) 0.000 - 0.045 ng/mL   Lactic Acid, Plasma    Collection Time: 10/07/22 12:26 PM   Result Value Ref Range    Lactic Acid Level 1.6 0.5 - 2.2 mmol/L   Lipid Panel    Collection Time: 10/07/22 12:26 PM   Result Value Ref Range    Cholesterol Total 91 <=200 mg/dL    HDL Cholesterol 36 35 - 60 mg/dL    Triglyceride 85 37 - 140 mg/dL    Cholesterol/HDL Ratio 3 0 - 5    Very Low Density Lipoprotein 17     LDL Cholesterol 38.00 (L) 50.00 -  140.00 mg/dL   Type & Screen    Collection Time: 10/07/22  3:53 PM   Result Value Ref Range    Group & Rh O NEG     Indirect Bing GEL NEG    APTT    Collection Time: 10/07/22  3:55 PM   Result Value Ref Range    PTT 31.1 23.2 - 33.7 seconds   Hemoglobin and Hematocrit    Collection Time: 10/07/22  3:55 PM   Result Value Ref Range    Hgb 7.4 (L) 12.0 - 16.0 gm/dL    Hct 24.5 (L) 37.0 - 47.0 %   Comprehensive Metabolic Panel    Collection Time: 10/08/22  4:18 AM   Result Value Ref Range    Sodium Level 137 136 - 145 mmol/L    Potassium Level 3.9 3.5 - 5.1 mmol/L    Chloride 109 (H) 98 - 107 mmol/L    Carbon Dioxide 19 (L) 23 - 31 mmol/L    Glucose Level 70 (L) 82 - 115 mg/dL    Blood Urea Nitrogen 11.6 9.8 - 20.1 mg/dL    Creatinine 0.66 0.55 - 1.02 mg/dL    Calcium Level Total 7.9 (L) 8.4 - 10.2 mg/dL    Protein Total 5.3 (L) 5.8 - 7.6 gm/dL    Albumin Level 1.6 (L) 3.4 - 4.8 gm/dL    Globulin 3.7 (H) 2.4 - 3.5 gm/dL    Albumin/Globulin Ratio 0.4 (L) 1.1 - 2.0 ratio    Bilirubin Total 0.6 <=1.5 mg/dL    Alkaline Phosphatase 150 40 - 150 unit/L    Alanine Aminotransferase 19 0 - 55 unit/L    Aspartate Aminotransferase 16 5 - 34 unit/L    eGFR >60 mls/min/1.73/m2   Magnesium    Collection Time: 10/08/22  4:18 AM   Result Value Ref Range    Magnesium Level 1.30 (L) 1.60 - 2.60 mg/dL   CBC with Differential    Collection Time: 10/08/22  9:03 AM   Result Value Ref Range    WBC 14.6 (H) 4.5 - 11.5 x10(3)/mcL    RBC 3.13 (L) 4.20 - 5.40 x10(6)/mcL    Hgb 7.7 (L) 12.0 - 16.0 gm/dL    Hct 26.3 (L) 37.0 - 47.0 %    MCV 84.0 80.0 - 94.0 fL    MCH 24.6 (L) 27.0 - 31.0 pg    MCHC 29.3 (L) 33.0 - 36.0 mg/dL    RDW 17.8 (H) 11.5 - 17.0 %    Platelet 193 130 - 400 x10(3)/mcL    MPV 11.7 (H) 7.4 - 10.4 fL    Neut % 84.1 %    Lymph % 7.1 %    Mono % 6.3 %    Eos % 0.3 %    Basophil % 0.2 %    Lymph # 1.04 0.6 - 4.6 x10(3)/mcL    Neut # 12.3 (H) 2.1 - 9.2 x10(3)/mcL    Mono # 0.92 0.1 - 1.3 x10(3)/mcL    Eos # 0.04 0 - 0.9 x10(3)/mcL  "   Baso # 0.03 0 - 0.2 x10(3)/mcL    IG# 0.29 (H) 0 - 0.04 x10(3)/mcL    IG% 2.0 %    NRBC% 0.1 %     Mrs. Dill is a 65 yo female with pmhx tobacco use, PUD/gastric perforation s/p gastrectomy in 2018, recurrent falls ongoing for the past year and 60 lbs unintentional weight loss. She is not a good historian. She underwent an MRI Brain w/o on 9/13/22 due to recurrent falls with results showing old cerebellar infarcts and an old right centrum semi oval infarct with chronic age related volume loss and chronic microvascular disease. She states "my  couldn't take it anymore so he sent me here". She states she has been falling more frequently over the past 2 weeks, also reports subjective fever, cough, dysuria, and intermittent episodes of chest pain however there is none present today.  She has urinary incontinence and wears a diaper.     Assessment/Plan:  1. Leukocytosis, unspecified type  D72.829 288.60   2. Anemia R42 780.4   3. Weakness  R53.1 780.79   4. Elevated troponin  R77.8 790.6     R55 780.2   6. Syncope  R55 780.2   7. CVA (cerebral vascular accident)  I63.9 434.91   8. Chest pain         Will continue IV PPI  Hgb stable  Will plan EGD Monday if Cardiology and Neuro clear for procedure    Evelyn Dailey ANP as scribe for Dr. Nati Smith      Above events noted. Agree with Evelyn Dailey's  ANP assessment and plan.   "

## 2022-10-08 NOTE — PROCEDURES
"Speech Language Pathology Department  Modified Barium Swallow Study    Patient Name:  Nimo Dill   MRN:  [id number]  Diagnosis: <principal problem not specified>     Recommendations:     General recommendations:  SLP follow up x1 for diet tolerance and Speech/Language and Cognitive Evaluation  Repeat MBS study: N/A   Diet recommendations: REGULAR/THIN LIQUIDS  Swallow strategies/precautions: small bites/sips and medications per patient preference  General precautions: Standard    History:     A Modified Barium Swallow Study was conducted to assess the efficiency of his/her swallow function, rule out aspiration and make recommendations regarding safe dietary consistencies, effective compensatory strategies, and safe eating environment.     Past Medical History:   Diagnosis Date    Common bile duct dilatation     Epigastric abdominal pain     Gastric ulcer, acute with hemorrhage and perforation     PUD (peptic ulcer disease)        Home Diet: Regular and thin liquids  Current Method of Nutrition: TPN/PPN    Patient complaint: "I want to eat"    Subjective:     Patient calm and cooperative.    Respiratory Status: Room air     Radiology Staff Present: radiology technician     Fluoroscopic Results:     Oral Musculature Evaluation:  Oral Musculature: general weakness  Dentition: present and adequate  Secretion Management: adequate  Mucosal Quality: good  Mandibular Strength and Mobility: WFL  Oral Labial Strength and Mobility: WFL  Lingual Strength and Mobility: impaired strength  Volitional Cough: present  Volitional Swallow: painful    Visualization  Patient was seen in the lateral view    Oral Phase:   Loss of bolus control    Pharyngeal Phase:   Swallow delay with spill to the valleculae   Reduced epiglottic deflection  Consistency Laryngeal Penetration Aspiration Residue Strategy   Thin liquid by cup None None     Thin liquid by straw None None     Chewable solid None None Mild base of tongue residue,  Cleared with " additional swallow            UES appeared to accommodate all bolus types without stasis or retrograde movement visualized      Assessment:   Pt presented with WFL swallow with no penetration/aspiration visualized during this study    Goals:   Multidisciplinary Problems       SLP Goals          Problem: SLP    Goal Priority Disciplines Outcome   SLP Goal     SLP Ongoing, Progressing   Description: Long Term Goal:  Pt will tolerate least restrictive diet with no clinical signs/sx of aspiration       Short Term Goals:  1. Pt will complete MBS to assess swallow function/safety for least restrictive diet           POC initiated and goals created  Bessie Alfaro CCC SLP                        Plan:     Patient to be seen:  daily   Plan of Care expires:  11/08/22  Plan of Care reviewed with:  patient, spouse   SLP Follow-Up:  Yes      Time Tracking:     SLP Treatment Date:    10/08/2022  Speech Start Time:   1500  Speech Stop Time:   1525   Speech Total Time (min):   25 min    Billable minutes: Motion Fluoroscopic Evaluation, Video Recording, 25 minutes         10/08/2022

## 2022-10-08 NOTE — PROGRESS NOTES
"[facility] Hospital Medicine Progress Note        Chief Complaint: Inpatient Follow-up for Dizziness (Pt to ER via POV for dizziness.  Started 2 weeks ago.  Was seen in [place] and had MRI.  States also weak and decreased appetite.  Pt states off balance and falling a lot.  Pt states missed the first appt and has not called them back)    HPI: Mrs. Dill is a 65 yo female with pmhx tobacco use, PUD/gastric perforation s/p gastrectomy in 2018, recurrent falls ongoing for the past year and 60 lbs unintentional weight loss. She is not a good historian. She underwent an MRI Brain w/o on 9/13/22 due to recurrent falls with results showing old cerebellar infarcts and an old right centrum semi oval infarct with chronic age related volume loss and chronic microvascular disease. She states "my  couldn't take it anymore so he sent me here". She states she has been falling more frequently over the past 2 weeks, also reports subjective fever, cough, dysuria, and intermittent episodes of chest pain however there is none present today.  She has urinary incontinence and wears a diaper.          Initial ED VS: Temperature 97.9°, heart rate 103, respirations 20, /71, oxygenation 97% room air.  CT head negative for acute intracranial finding.  Labs remarkable for leukocytosis of 18,600 with a left shift, hemoglobin 9.7, hematocrit 32.5, troponin 0.347 . EKG BRENNA with non-specific ST changes, right atrial enlargement..  UA positive for UTI., BUN 31.5. Pt is being admitted to . [facility] consulted, will follow.          Patient seen and examined this evening, she is a poor historian.  She denies history of CAD/AMI.  She reports dark tarry stools but unable to tell me for how long. No NSAID use, hx of PUD with gastric ulcer perf in th past.  She reports multiple recurrent falls over the past year however they have worsened over the past few weeks and does state that at times she loses " consciousness with her falls She has never had routine colonoscopy screening.     Interval Hx:   Hematocrit at 24.5 w fluids 10/7/22 and 26.3 wo fluids today 10-8-21. Pt will receive 2 units prbc. Then , had a fall in room while attempting to get out of bed. Skin breakdowns to LUE, no loc    Objective/physical exam:  General:  Cachectic female in no acute distress,    HEENT: NC/AT, edentulous, tongue red with white bumps in the back.    Neck:  No JVD    Chest: CTABL    CVS: Regular rhythm. Normal S1/S2.    Abdomen: nondistended, normoactive BS, soft and non-tender.    MSK: No obvious deformity or joint swelling diffuse muscle atrophy    Skin: multiple bruises to BUE, new skin breakdowns to RUE from fall    Neuro: AAOx3, speech slow but appropriate,  motor strength 5/5 BLE and BUE, gait not assessed, Finger to nose test WNL,ataxic.     Psych: Cooperative    VITAL SIGNS: 24 HRS MIN & MAX LAST   Temp  Min: 98.1 °F (36.7 °C)  Max: 100.4 °F (38 °C) 98.2 °F (36.8 °C)   BP  Min: 117/68  Max: 135/80 129/70   Pulse  Min: 84  Max: 98  85   Resp  Min: 16  Max: 22 18   SpO2  Min: 91 %  Max: 94 % (!) 92 %         Recent Labs   Lab 10/06/22  1841 10/07/22  1026 10/07/22  1555 10/08/22  0903   WBC 18.6* 15.1*  --  14.6*   RBC 3.85* 3.35*  --  3.13*   HGB 9.7* 8.3* 7.4* 7.7*   HCT 32.5* 28.1* 24.5* 26.3*   MCV 84.4 83.9  --  84.0   MCH 25.2* 24.8*  --  24.6*   MCHC 29.8* 29.5*  --  29.3*   RDW 17.9* 17.8*  --  17.8*    142  --  193   MPV  --  11.8*  --  11.7*       Recent Labs   Lab 10/06/22  1841 10/07/22  0715 10/08/22  0418    140 137   K 4.0 3.3* 3.9   CO2 21* 20* 19*   BUN 31.5* 23.2* 11.6   CREATININE 0.74 0.78 0.66   CALCIUM 8.5 8.3* 7.9*   MG 1.70 1.70 1.30*   ALBUMIN 2.0*  --  1.6*   ALKPHOS 195*  --  150   ALT 26  --  19   AST 20  --  16   BILITOT 0.8  --  0.6          Microbiology Results (last 7 days)       Procedure Component Value Units Date/Time    Urine culture [[id number]]  (Abnormal) Collected:  10/06/22 2146    Order Status: Completed Specimen: Urine Updated: 10/08/22 0743     Urine Culture >/= 100,000 colonies/ml Gram-negative Rods    Blood culture #1 **CANNOT BE ORDERED STAT** [[id number]]  (Normal) Collected: 10/06/22 2129    Order Status: Completed Specimen: Blood Updated: 10/07/22 2201     CULTURE, BLOOD ([facility]) No Growth At 24 Hours    Blood culture #2 **CANNOT BE ORDERED STAT** [[id number]]  (Normal) Collected: 10/06/22 2129    Order Status: Completed Specimen: Blood Updated: 10/07/22 2201     CULTURE, BLOOD ([facility]) No Growth At 24 Hours             See below for Radiology    Scheduled Med:   aspirin  325 mg Oral Daily    atorvastatin  40 mg Oral Daily    azithromycin (ZITHROMAX) 500 mg IVPB  500 mg Intravenous Q24H    cefTRIAXone (ROCEPHIN) IVPB  1 g Intravenous Q24H    ferric gluconate (FERRLECIT) IVPB  125 mg Intravenous Daily    multivitamin  1 tablet Oral Daily    nicotine  1 patch Transdermal Daily    nystatin-triamcinolone   Topical (Top) TID    pantoprazole  40 mg Intravenous BID    sodium chloride 0.9%  10 mL Intravenous Q6H        Continuous Infusions:   lactated ringers 125 mL/hr at 10/07/22 2051        PRN Meds:  sodium chloride, acetaminophen, acetaminophen, albuterol-ipratropium, aluminum-magnesium hydroxide-simethicone, melatonin, ondansetron, polyethylene glycol, prochlorperazine, senna-docusate 8.6-50 mg, simethicone, sodium chloride 0.9%, Flushing PICC Protocol **AND** sodium chloride 0.9% **AND** sodium chloride 0.9%, traMADoL, zinc oxide-cod liver oil       Assessment/Plan:    Sepsis      CAP- Right lung  -rocephin/azithromycin      UTI (POA)  -gram negative rods  -Rocephin day 2        NSTEMI      Recurrent Syncope/Falls/Gait Ataxia, chronic w findings of chronic cerebellar infarct        several acute nonhemorrhagic lacunar infarcts which involve bilateral cerebellar hemispheres, right aspect of the [place]ust[place], left occipital lobe, right basal ganglia and bilateral frontoparietal lobes.    -old lacunar infarcts which involve bilateral cerebellar hemispheres and the right corona radiata      Hypochromic  microcytic Anemia/Melena  -low iron w low iron sat  -will start ferlecit 125 mgs iv x 5 days.        Acute symptomatic anemia  - will transfuse 2 units prbc      Severe PCM with 60 # weight loss  -passed mbs, start clear liquid diet      Tobacco Use Disorder      Muscular and physical deconditioning      Plan- transfuse 2 units prbc/pt will need EGD for suspected acute ugib/continue ppi infusion/ferrlicit 125 mgs iv daily x 5 days/neurology consult/ pt will need Doac eventually secondary to bilateral strokes probably from cardiac origen, continue telemetry. Consult PT, eventually. For the time being pt cannot get any form of anticoagulation the diffuse in no with a double the      Cc time 35 minutes  Cc dx-ugib requiring prbc transfusion              VTE prophylaxis:     Patient condition: Guarded    Anticipated discharge and Disposition:         All diagnosis and differential diagnosis have been reviewed; assessment and plan has been documented; I have personally reviewed the labs and test results that are presently available; I have reviewed the patients medication list; I have reviewed the consulting providers response and recommendations. I have reviewed or attempted to review medical records based upon their availability    All of the patient's questions have been  addressed and answered. Patient's is agreeable to the above stated plan. I will continue to monitor closely and make adjustments to medical management as needed.  _____________________________________________________________________    Nutrition Status:    Radiology:  Fl Modified Barium Swallow Speech  See procedure notes from Speech Pathologist.    This procedure was auto-finalized.      Dc Castrejon MD   10/08/2022

## 2022-10-08 NOTE — PROCEDURES
Speech Language Pathology Department  Modified Barium Swallow Study    Patient Name:  Nimo Dill   MRN:  [id number]  Diagnosis: <principal problem not specified>     Recommendations:     General recommendations:  {SLP Recs:00660}  Repeat MBS study: ***  Diet recommendations:  NPO, Liquid Diet Level: NPO   Swallow strategies/precautions: {swallow strategies:99060}  General precautions: Standard, aspiration, NPO    History:     A Modified Barium Swallow Study was *** to assess the efficiency of his/her swallow function, rule out aspiration and make recommendations regarding safe dietary consistencies, effective compensatory strategies, and safe eating environment.     Past Medical History:   Diagnosis Date    Common bile duct dilatation     Epigastric abdominal pain     Gastric ulcer, acute with hemorrhage and perforation     PUD (peptic ulcer disease)        Home Diet: {home diet:88535}  Current Method of Nutrition: {nutrition method:99082}    Patient complaint: ***    Subjective:     Patient {affect:10726}.    Respiratory Status: ***    Radiology Staff Present: ***    Fluoroscopic Results:     Oral Musculature Evaluation:  Oral Musculature: general weakness  Dentition: present and adequate  Secretion Management: adequate  Mucosal Quality: good  Mandibular Strength and Mobility: WFL  Oral Labial Strength and Mobility: WFL  Lingual Strength and Mobility: impaired strength  Volitional Cough: present  Volitional Swallow: painful    Visualization  {IP SLP VISUALIZATION [facility]:50120}    Oral Phase:   {oral phase:81740}    Pharyngeal Phase:   {pharyngeal phase:45836}  Consistency Laryngeal Penetration Aspiration Residue Strategy   {Consistency:83677} {Laryngeal penetration:67786} {Aspiration:29039}     {Consistency:08981} {Laryngeal penetration:99024} {Aspiration:91123}     {Consistency:53557} {Laryngeal penetration:56547} {Aspiration:42693}     {Consistency:04020} {Laryngeal penetration:10278} {Aspiration:27954}      {Consistency:23963} {Laryngeal penetration:36631} {Aspiration:82368}     {Consistency:46950} {Laryngeal penetration:87298} {Aspiration:40786}     {Consistency:31442} {Laryngeal penetration:48503} {Aspiration:74779}     {Consistency:37130} {Laryngeal penetration:57209} {Aspiration:45616}     {Consistency:56972} {Laryngeal penetration:65048} {Aspiration:75437}     {Consistency:06488} {Laryngeal penetration:93426} {Aspiration:22332}     {Consistency:23505} {Laryngeal penetration:36817} {Aspiration:48313}     {Consistency:75377} {Laryngeal penetration:02485} {Aspiration:64396}       Cervical Esophageal Phase:   {esophageal phase:81419}    Strategies Attempted:      Additional Comments:      Assessment:     ***    Goals:   Multidisciplinary Problems       SLP Goals          Problem: SLP    Goal Priority Disciplines Outcome   SLP Goal     SLP Ongoing, Progressing   Description: Long Term Goal:  Pt will tolerate least restrictive diet with no clinical signs/sx of aspiration       Short Term Goals:  1. Pt will complete MBS to assess swallow function/safety for least restrictive diet           POC initiated and goals created  Bessie Alfaro CCC SLP                        Plan:     Patient to be seen:  daily   Plan of Care expires:  11/08/22  Plan of Care reviewed with:  patient, spouse   SLP Follow-Up:  Yes      Time Tracking:     SLP Treatment Date:      Speech Start Time:     Speech Stop Time:  1005     Speech Total Time (min):       Billable minutes: {olghslpminutes:24028}       10/08/2022

## 2022-10-09 LAB
ANION GAP SERPL CALC-SCNC: 9 MEQ/L
BACTERIA UR CULT: ABNORMAL
BASOPHILS # BLD AUTO: 0.05 X10(3)/MCL (ref 0–0.2)
BASOPHILS NFR BLD AUTO: 0.3 %
BUN SERPL-MCNC: 9.9 MG/DL (ref 9.8–20.1)
CALCIUM SERPL-MCNC: 8 MG/DL (ref 8.4–10.2)
CHLORIDE SERPL-SCNC: 104 MMOL/L (ref 98–107)
CO2 SERPL-SCNC: 25 MMOL/L (ref 23–31)
CREAT SERPL-MCNC: 0.66 MG/DL (ref 0.55–1.02)
CREAT/UREA NIT SERPL: 15
EOSINOPHIL # BLD AUTO: 0.04 X10(3)/MCL (ref 0–0.9)
EOSINOPHIL NFR BLD AUTO: 0.3 %
ERYTHROCYTE [DISTWIDTH] IN BLOOD BY AUTOMATED COUNT: 16.5 % (ref 11.5–17)
GFR SERPLBLD CREATININE-BSD FMLA CKD-EPI: >60 MLS/MIN/1.73/M2
GLUCOSE SERPL-MCNC: 76 MG/DL (ref 82–115)
HCT VFR BLD AUTO: 35 % (ref 37–47)
HGB BLD-MCNC: 11.2 GM/DL (ref 12–16)
IMM GRANULOCYTES # BLD AUTO: 0.37 X10(3)/MCL (ref 0–0.04)
IMM GRANULOCYTES NFR BLD AUTO: 2.6 %
LYMPHOCYTES # BLD AUTO: 1.22 X10(3)/MCL (ref 0.6–4.6)
LYMPHOCYTES NFR BLD AUTO: 8.5 %
MAGNESIUM SERPL-MCNC: 1.3 MG/DL (ref 1.6–2.6)
MCH RBC QN AUTO: 26.7 PG (ref 27–31)
MCHC RBC AUTO-ENTMCNC: 32 MG/DL (ref 33–36)
MCV RBC AUTO: 83.5 FL (ref 80–94)
MONOCYTES # BLD AUTO: 0.91 X10(3)/MCL (ref 0.1–1.3)
MONOCYTES NFR BLD AUTO: 6.4 %
NEUTROPHILS # BLD AUTO: 11.7 X10(3)/MCL (ref 2.1–9.2)
NEUTROPHILS NFR BLD AUTO: 81.9 %
NRBC BLD AUTO-RTO: 0 %
PLATELET # BLD AUTO: 214 X10(3)/MCL (ref 130–400)
PMV BLD AUTO: 11.6 FL (ref 7.4–10.4)
POTASSIUM SERPL-SCNC: 3.7 MMOL/L (ref 3.5–5.1)
RBC # BLD AUTO: 4.19 X10(6)/MCL (ref 4.2–5.4)
SODIUM SERPL-SCNC: 138 MMOL/L (ref 136–145)
WBC # SPEC AUTO: 14.3 X10(3)/MCL (ref 4.5–11.5)

## 2022-10-09 PROCEDURE — 25000003 PHARM REV CODE 250: Performed by: INTERNAL MEDICINE

## 2022-10-09 PROCEDURE — 36415 COLL VENOUS BLD VENIPUNCTURE: CPT | Performed by: INTERNAL MEDICINE

## 2022-10-09 PROCEDURE — 99231 PR SUBSEQUENT HOSPITAL CARE,LEVL I: ICD-10-PCS | Mod: ,,, | Performed by: PSYCHIATRY & NEUROLOGY

## 2022-10-09 PROCEDURE — 63600175 PHARM REV CODE 636 W HCPCS: Performed by: NURSE PRACTITIONER

## 2022-10-09 PROCEDURE — 25500020 PHARM REV CODE 255: Performed by: INTERNAL MEDICINE

## 2022-10-09 PROCEDURE — C9113 INJ PANTOPRAZOLE SODIUM, VIA: HCPCS | Performed by: NURSE PRACTITIONER

## 2022-10-09 PROCEDURE — 83735 ASSAY OF MAGNESIUM: CPT | Performed by: INTERNAL MEDICINE

## 2022-10-09 PROCEDURE — 63600175 PHARM REV CODE 636 W HCPCS: Performed by: INTERNAL MEDICINE

## 2022-10-09 PROCEDURE — A4216 STERILE WATER/SALINE, 10 ML: HCPCS | Performed by: INTERNAL MEDICINE

## 2022-10-09 PROCEDURE — S4991 NICOTINE PATCH NONLEGEND: HCPCS | Performed by: NURSE PRACTITIONER

## 2022-10-09 PROCEDURE — 99231 SBSQ HOSP IP/OBS SF/LOW 25: CPT | Mod: ,,, | Performed by: PSYCHIATRY & NEUROLOGY

## 2022-10-09 PROCEDURE — 85025 COMPLETE CBC W/AUTO DIFF WBC: CPT | Performed by: INTERNAL MEDICINE

## 2022-10-09 PROCEDURE — 97530 THERAPEUTIC ACTIVITIES: CPT | Mod: CQ

## 2022-10-09 PROCEDURE — 21400001 HC TELEMETRY ROOM

## 2022-10-09 PROCEDURE — 97535 SELF CARE MNGMENT TRAINING: CPT | Mod: CO

## 2022-10-09 PROCEDURE — 80048 BASIC METABOLIC PNL TOTAL CA: CPT | Performed by: INTERNAL MEDICINE

## 2022-10-09 PROCEDURE — 25000003 PHARM REV CODE 250: Performed by: NURSE PRACTITIONER

## 2022-10-09 RX ORDER — HYDROCODONE BITARTRATE AND ACETAMINOPHEN 5; 325 MG/1; MG/1
1 TABLET ORAL ONCE
Status: DISCONTINUED | OUTPATIENT
Start: 2022-10-09 | End: 2022-10-11 | Stop reason: HOSPADM

## 2022-10-09 RX ORDER — MAGNESIUM SULFATE HEPTAHYDRATE 40 MG/ML
2 INJECTION, SOLUTION INTRAVENOUS ONCE
Status: COMPLETED | OUTPATIENT
Start: 2022-10-09 | End: 2022-10-09

## 2022-10-09 RX ADMIN — PANTOPRAZOLE SODIUM 40 MG: 40 INJECTION, POWDER, FOR SOLUTION INTRAVENOUS at 09:10

## 2022-10-09 RX ADMIN — ATORVASTATIN CALCIUM 40 MG: 40 TABLET, FILM COATED ORAL at 09:10

## 2022-10-09 RX ADMIN — TRAMADOL HYDROCHLORIDE 50 MG: 50 TABLET, COATED ORAL at 11:10

## 2022-10-09 RX ADMIN — CEFTRIAXONE SODIUM 1 G: 1 INJECTION, POWDER, FOR SOLUTION INTRAMUSCULAR; INTRAVENOUS at 01:10

## 2022-10-09 RX ADMIN — AZITHROMYCIN MONOHYDRATE 500 MG: 500 INJECTION, POWDER, LYOPHILIZED, FOR SOLUTION INTRAVENOUS at 11:10

## 2022-10-09 RX ADMIN — SODIUM CHLORIDE, PRESERVATIVE FREE 10 ML: 5 INJECTION INTRAVENOUS at 11:10

## 2022-10-09 RX ADMIN — SODIUM CHLORIDE: 9 INJECTION, SOLUTION INTRAVENOUS at 02:10

## 2022-10-09 RX ADMIN — NYSTATIN AND TRIAMCINOLONE ACETONIDE: 100000; 1 CREAM TOPICAL at 04:10

## 2022-10-09 RX ADMIN — THERA TABS 1 TABLET: TAB at 09:10

## 2022-10-09 RX ADMIN — SODIUM CHLORIDE 125 MG: 9 INJECTION, SOLUTION INTRAVENOUS at 09:10

## 2022-10-09 RX ADMIN — NICOTINE 1 PATCH: 14 PATCH TRANSDERMAL at 09:10

## 2022-10-09 RX ADMIN — FUROSEMIDE 20 MG: 10 INJECTION, SOLUTION INTRAMUSCULAR; INTRAVENOUS at 01:10

## 2022-10-09 RX ADMIN — NYSTATIN AND TRIAMCINOLONE ACETONIDE: 100000; 1 CREAM TOPICAL at 09:10

## 2022-10-09 RX ADMIN — TRAMADOL HYDROCHLORIDE 50 MG: 50 TABLET, COATED ORAL at 09:10

## 2022-10-09 RX ADMIN — MELATONIN TAB 3 MG 6 MG: 3 TAB at 09:10

## 2022-10-09 RX ADMIN — SODIUM CHLORIDE, PRESERVATIVE FREE 10 ML: 5 INJECTION INTRAVENOUS at 02:10

## 2022-10-09 RX ADMIN — IOPAMIDOL 100 ML: 755 INJECTION, SOLUTION INTRAVENOUS at 10:10

## 2022-10-09 RX ADMIN — SODIUM CHLORIDE, PRESERVATIVE FREE 10 ML: 5 INJECTION INTRAVENOUS at 06:10

## 2022-10-09 RX ADMIN — MAGNESIUM SULFATE HEPTAHYDRATE 2 G: 40 INJECTION, SOLUTION INTRAVENOUS at 04:10

## 2022-10-09 NOTE — PT/OT/SLP PROGRESS
Occupational Therapy  Treatment    Nimo Dill   MRN: [id number]   Admitting Diagnosis: <principal problem not specified>    OT Date of Treatment: 10/09/22   OT Start Time: 1015  OT Stop Time: 1039  OT Total Time (min): 24 min     Billable Minutes:  Self Care/Home Management 2  Total Minutes: 24     OT/SHERLYN: SHERLYN PLATA Visit Number: 1    General Precautions: Standard, aspiration  Orthopedic Precautions:    Braces:      Spiritual, Cultural Beliefs, Gnosticism Practices, Values that Affect Care: no    Subjective:  Communicated with RN prior to session.  Alert  Distracted    Objective:  Pt. Soiled upon entry.   Pt. Requiring Mod A for rolling throughout toileting activity with total A for pericare.  (Sitting balance-SBA) (Sit to stand-Min A) using RW for UE support with balance. Pt. Taking steps from EOB however heavy BM noted. Pt. Returned BTB and performed pericare. Pt. Left in bed with all needs within reach, positioned appropriately.       Functional Mobility:  Bed Mobility:   Supine to sit: Moderate Assistance   Sit to supine: Moderate Assistance   Rolling: Moderate Assistance   Scooting: Moderate Assistance    Patient left with bed in chair position with all lines intact and call button in reach    ASSESSMENT:  Nimo Dill is a 66 y.o. female with a medical diagnosis of <principal problem not specified> Pt. Tolerated session well overall limited due to constant BM. Continue POC    Rehab potential is good    Activity tolerance: Good    Discharge recommendations: rehabilitation facility     Equipment recommendations: walker, rolling     GOALS:   Multidisciplinary Problems       Occupational Therapy Goals          Problem: Occupational Therapy    Goal Priority Disciplines Outcome Interventions   Occupational Therapy Goal     OT, PT/OT Ongoing, Progressing    Description: Goals to be met by: 11/11/22     Patient will increase functional independence with ADLs by performing:    UE Dressing with Modified  [place].  LE Dressing with Modified [place].  Grooming while seated with Modified [place].  Toileting from bedside commode with Modified [place] for hygiene and clothing management.   Toilet transfer to bedside commode with Modified [place].                         Plan:  Patient to be seen 5 x/week to address the above listed problems via self-care/home management, therapeutic activities, therapeutic exercises  Plan of Care expires: 11/11/22  Plan of Care reviewed with: patient         10/09/2022

## 2022-10-09 NOTE — PROGRESS NOTES
"Gastroenterology Progress Note    Subjective/Interval History:    Mrs. Dill is a 65 yo female with pmhx tobacco use, PUD/gastric perforation s/p gastrectomy in 2018, recurrent falls ongoing for the past year and 60 lbs unintentional weight loss. She is not a good historian. She underwent an MRI Brain w/o on 9/13/22 due to recurrent falls with results showing old cerebellar infarcts and an old right centrum semi oval infarct with chronic age related volume loss and chronic microvascular disease. She states "my  couldn't take it anymore so he sent me here". She states she has been falling more frequently over the past 2 weeks, also reports subjective fever, cough, dysuria, and intermittent episodes of chest pain however there is none present today.  She has urinary incontinence and wears a diaper      10-8-22  Pt with no bleeding overnight  Hgb remains stable  No acute cahnges  No pain    ROS:    generalized weakness.    recurrent falls.    lightheadedness and dizziness.   Occasional shortness of breath.  No significant chest pain at this time.  History of dark stool reported in the last 4 weeks or so.  No hematochezia.  No hematemesis.  No significant nausea vomiting.  No fever or chills.       Vital Signs:  /84   Pulse 86   Temp 98.9 °F (37.2 °C) (Oral)   Resp 20   Ht 5' 4" (1.626 m)   Wt 44.6 kg (98 lb 5.2 oz)   SpO2 95%   Breastfeeding No   BMI 16.88 kg/m²   Body mass index is 16.88 kg/m².    Physical Exam:    HEENT examination:  Pale conjunctiva anicteric sclera   Neck:  Supple,   Chest:  Decreased breath sounds bilaterally.  Occasional bibasilar crackles.    Heart examination:  S1, S2 audible  Abdomen:  Bowel sounds positive.  Soft.  Nontender.  Extremities:  No significant edema.  She does have diffuse ecchymoses in bilateral upper and lower extremities     Labs:  Recent Results (from the past 48 hour(s))   Comprehensive Metabolic Panel    Collection Time: 10/08/22  4:18 AM   Result Value " Ref Range    Sodium Level 137 136 - 145 mmol/L    Potassium Level 3.9 3.5 - 5.1 mmol/L    Chloride 109 (H) 98 - 107 mmol/L    Carbon Dioxide 19 (L) 23 - 31 mmol/L    Glucose Level 70 (L) 82 - 115 mg/dL    Blood Urea Nitrogen 11.6 9.8 - 20.1 mg/dL    Creatinine 0.66 0.55 - 1.02 mg/dL    Calcium Level Total 7.9 (L) 8.4 - 10.2 mg/dL    Protein Total 5.3 (L) 5.8 - 7.6 gm/dL    Albumin Level 1.6 (L) 3.4 - 4.8 gm/dL    Globulin 3.7 (H) 2.4 - 3.5 gm/dL    Albumin/Globulin Ratio 0.4 (L) 1.1 - 2.0 ratio    Bilirubin Total 0.6 <=1.5 mg/dL    Alkaline Phosphatase 150 40 - 150 unit/L    Alanine Aminotransferase 19 0 - 55 unit/L    Aspartate Aminotransferase 16 5 - 34 unit/L    eGFR >60 mls/min/1.73/m2   Magnesium    Collection Time: 10/08/22  4:18 AM   Result Value Ref Range    Magnesium Level 1.30 (L) 1.60 - 2.60 mg/dL   CBC with Differential    Collection Time: 10/08/22  9:03 AM   Result Value Ref Range    WBC 14.6 (H) 4.5 - 11.5 x10(3)/mcL    RBC 3.13 (L) 4.20 - 5.40 x10(6)/mcL    Hgb 7.7 (L) 12.0 - 16.0 gm/dL    Hct 26.3 (L) 37.0 - 47.0 %    MCV 84.0 80.0 - 94.0 fL    MCH 24.6 (L) 27.0 - 31.0 pg    MCHC 29.3 (L) 33.0 - 36.0 mg/dL    RDW 17.8 (H) 11.5 - 17.0 %    Platelet 193 130 - 400 x10(3)/mcL    MPV 11.7 (H) 7.4 - 10.4 fL    Neut % 84.1 %    Lymph % 7.1 %    Mono % 6.3 %    Eos % 0.3 %    Basophil % 0.2 %    Lymph # 1.04 0.6 - 4.6 x10(3)/mcL    Neut # 12.3 (H) 2.1 - 9.2 x10(3)/mcL    Mono # 0.92 0.1 - 1.3 x10(3)/mcL    Eos # 0.04 0 - 0.9 x10(3)/mcL    Baso # 0.03 0 - 0.2 x10(3)/mcL    IG# 0.29 (H) 0 - 0.04 x10(3)/mcL    IG% 2.0 %    NRBC% 0.1 %   Type & Screen    Collection Time: 10/08/22  2:10 PM   Result Value Ref Range    Group & Rh O NEG     Indirect Bing GEL NEG    Prepare RBC 2 Units; low H&H    Collection Time: 10/08/22  2:10 PM   Result Value Ref Range    UNIT NUMBER [id number]     UNIT ABO/RH O NEG     DISPENSE STATUS Transfused     Unit Expiration [id number]     Product Code V7145O17     Unit Blood Type  Code 9500     CROSSMATCH INTERPRETATION Compatible     UNIT NUMBER [id number]     UNIT ABO/RH O NEG     DISPENSE STATUS Transfused     Unit Expiration [id number]     Product Code N9868A51     Unit Blood Type Code 9500     CROSSMATCH INTERPRETATION Compatible    Magnesium    Collection Time: 10/09/22  6:05 AM   Result Value Ref Range    Magnesium Level 1.30 (L) 1.60 - 2.60 mg/dL   Basic Metabolic Panel    Collection Time: 10/09/22  6:05 AM   Result Value Ref Range    Sodium Level 138 136 - 145 mmol/L    Potassium Level 3.7 3.5 - 5.1 mmol/L    Chloride 104 98 - 107 mmol/L    Carbon Dioxide 25 23 - 31 mmol/L    Glucose Level 76 (L) 82 - 115 mg/dL    Blood Urea Nitrogen 9.9 9.8 - 20.1 mg/dL    Creatinine 0.66 0.55 - 1.02 mg/dL    BUN/Creatinine Ratio 15     Calcium Level Total 8.0 (L) 8.4 - 10.2 mg/dL    Anion Gap 9.0 mEq/L    eGFR >60 mls/min/1.73/m2   CBC with Differential    Collection Time: 10/09/22  8:39 AM   Result Value Ref Range    WBC 14.3 (H) 4.5 - 11.5 x10(3)/mcL    RBC 4.19 (L) 4.20 - 5.40 x10(6)/mcL    Hgb 11.2 (L) 12.0 - 16.0 gm/dL    Hct 35.0 (L) 37.0 - 47.0 %    MCV 83.5 80.0 - 94.0 fL    MCH 26.7 (L) 27.0 - 31.0 pg    MCHC 32.0 (L) 33.0 - 36.0 mg/dL    RDW 16.5 11.5 - 17.0 %    Platelet 214 130 - 400 x10(3)/mcL    MPV 11.6 (H) 7.4 - 10.4 fL    Neut % 81.9 %    Lymph % 8.5 %    Mono % 6.4 %    Eos % 0.3 %    Basophil % 0.3 %    Lymph # 1.22 0.6 - 4.6 x10(3)/mcL    Neut # 11.7 (H) 2.1 - 9.2 x10(3)/mcL    Mono # 0.91 0.1 - 1.3 x10(3)/mcL    Eos # 0.04 0 - 0.9 x10(3)/mcL    Baso # 0.05 0 - 0.2 x10(3)/mcL    IG# 0.37 (H) 0 - 0.04 x10(3)/mcL    IG% 2.6 %    NRBC% 0.0 %     Mrs. Dill is a 67 yo female with pmhx tobacco use, PUD/gastric perforation s/p gastrectomy in 2018, recurrent falls ongoing for the past year and 60 lbs unintentional weight loss. She is not a good historian. She underwent an MRI Brain w/o on 9/13/22 due to recurrent falls with results showing old cerebellar infarcts and an old right  "centrum semi oval infarct with chronic age related volume loss and chronic microvascular disease. She states "my  couldn't take it anymore so he sent me here". She states she has been falling more frequently over the past 2 weeks, also reports subjective fever, cough, dysuria, and intermittent episodes of chest pain however there is none present today.  She has urinary incontinence and wears a diaper.     Assessment/Plan:  1. Leukocytosis, unspecified type  D72.829 288.60   2. Anemia R42 780.4   3. Weakness  R53.1 780.79   4. Elevated troponin  R77.8 790.6     R55 780.2   6. Syncope  R55 780.2   7. CVA (cerebral vascular accident)  I63.9 434.91   8. Chest pain         Will continue IV PPI  Hgb stable  Will plan EGD Monday if Cardiology and Neuro clear for procedure    10/9/22  Events noted.  Patient feeling a lot better.  Hemoglobin/hematocrit increased after packed RBC transfusion.  Cardiology as well as Neurology clearance noted.  Plan for upper endoscopy for tomorrow.  NPO after midnight.      "

## 2022-10-09 NOTE — PROGRESS NOTES
S:  Gaining some strength  Feels much better today  Eating a hamburger  EGD Monday  Got midline access    O  Vitals:    10/09/22 1125   BP:    Pulse:    Resp: 20   Temp:          Mental Status: Alert and oriented x3. Language is fluent with good comprehension.    Cranial Nerve: Pupils are equal, round, and reactive to light. Visual fields are intact to confrontation. Normal fundi. Ocular movements are intact. Face is symmetric at rest and with activation with intact sensation throughout. Hearing intact to finger rub bilaterally. Muscles of tongue and palate activate symmetrically. No dysarthria. Strength is full in sternocleidomastoid and trapezius bilaterally.    Motor: Muscle bulk and tone are normal. Strength is 5/5 in all four extremities both proximally and distally. Intact fine motor movements bilaterally. There is no pronator drift or satelliting on arm roll.    Sensory: Sensation is intact to light touch, pinprick, vibration, and proprioception throughout. Romberg is negative.    Reflexes: 2+ and symmetric at the biceps, triceps, brachioradialis, patella, and Achilles bilaterally. Plantar response is flexor bilaterally.    Coordination: No dysmetria on finger-nose-finger or heel-knee-shin. Normal rapid alternating movements. Fast finger tapping with normal amplitude and speed.    Gait: Narrow based with normal stride length and good arm swing bilaterally. Able to walk on heels, toes, and in tandem.      A/p  Stroke, multiterritory embolic  I think she needs anticoagulation; [facility] starting xarelto 10 mg/day after the EGD is complete    She is cleared for EGD from my standpoint    [facility]    Please reconsult on call neurology if further assistance is needed

## 2022-10-09 NOTE — PT/OT/SLP PROGRESS
Physical Therapy Treatment    Patient Name:  Nimo Dill   MRN:  [id number]    Recommendations:     Discharge Recommendations:  nursing facility, skilled, rehabilitation facility   Discharge Equipment Recommendations: walker, rolling   Barriers to discharge:       Assessment:     Nimo Dill is a 66 y.o. female admitted with a medical diagnosis of Several acute nonhemorrhagic lacunar infarct which bilateral cerebellar hemisphere. .  She presents with the following impairments/functional limitations:  weakness, impaired endurance, impaired self care skills.    Rehab Prognosis: Good; patient would benefit from acute skilled PT services to address these deficits and reach maximum level of function.    Recent Surgery: * No surgery found *      Plan:     During this hospitalization, patient to be seen 6 x/week to address the identified rehab impairments via gait training, therapeutic activities, therapeutic exercises and progress toward the following goals:    Plan of Care Expires:  11/07/22    Subjective     Chief Complaint:   Patient/Family Comments/goals:   Pain/Comfort:         Objective:     Communicated with NSG prior to session.  Patient found supine with telemetry, peripheral IV upon PTA entry to room.     General Precautions: Standard, aspiration   Orthopedic Precautions:N/A   Braces: N/A  Respiratory Status: Room air     / 73 HR 88     Functional Mobility:  Bed: Renzo roll L and R for pericare, pt had BM. Renzo supine <-> sit EOB  T/F: Renzo sit <-> stand   Gait: Pt amb for approximately 20 ft step through gait pattern with NBOS and slow pace, demonstrates wide turns, limited by BM while ambulating.      Patient left HOB elevated with all lines intact and call button in reach..    GOALS:   Multidisciplinary Problems       Physical Therapy Goals          Problem: Physical Therapy    Goal Priority Disciplines Outcome Goal Variances Interventions   Physical Therapy Goal     PT, PT/OT Ongoing, Progressing      Description: Goals to be met by: 2022    Patient will increase functional independence with mobility by performin. Supine to sit with Contact Guard Assistance  2. Sit to stand transfer with Contact Guard Assistance  3. Gait  x 200 feet with Contact Guard Assistance using Rolling Walker.                          Time Tracking:     PT Received On:    PT Start Time: 1018     PT Stop Time: 1042  PT Total Time (min): 24 min     Billable Minutes: Therapeutic Activity 24    Treatment Type: Treatment  PT/PTA: PTA     PTA Visit Number: 1     10/09/2022

## 2022-10-09 NOTE — CONSULTS
Inpatient consult to Cardiology  Consult performed by: YFN Lovell  Consult ordered by: Dc Castrejon MD    [facility] - 6th Floor Medical Telemetry  Cardiology  Consult Note    Patient Name: Nimo Dill  MRN: [id number]  Admission Date: 10/6/2022  Hospital Length of Stay: 3 days  Code Status: Full Code   Attending Provider: Timothy Shipley MD   Consulting Provider: YFN Lovell  Primary Care Physician: NELI Hodges  Principal Problem:<principal problem not specified>    Patient information was obtained from patient, past medical records, and ER records.     Subjective:     Chief Complaint: Reason for Consult: Elevated Troponin/CVA    HPI: Ms. Dill is a 67 y/o female who is unknown to [facility]. The patient presented to [facility] on 10.6.22 with c/o Dizziness which started 2 weeks prior. 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 She was admitted to Hospital Medicine for an Acute CVA and her Troponins were Trended with results of 0.347. [facility] has been consulted for Elevated Troponin.     PMH: Common Bile Duct Dilatation, Gastric Ulcer Hemorrhage/Perforation, PUD  PSH: Appendectomy, , Cholecystectomy, Gastrectomy, Hysterectomy, Rectovaginal Fistula Repair, Tonsillectomy, Vagotomy and Pyloroplasty  Family History: Father, L, HTN, HLD; Mother, L, Healthy  Social History: Denies Illicit Drug and ETOH Use; + Tobacco Use; 1 ppd for 40+ Years    Previous Cardiac Diagnostics:   ECHO 10.7.22:  The estimated ejection fraction is 55-60%.  Normal systolic function.  Normal left ventricular diastolic function.  Moderate mitral regurgitation.  Mild tricuspid regurgitation.  Normal central venous pressure (3 mmHg).  The estimated PA systolic pressure is 35 mmHg.    Carotid US 10.6.22:  The right internal carotid artery demonstrated no  hemodynamically significant stenosis.  The left internal carotid artery demonstrated no hemodynamically significant stenosis.   Bilateral vertebral arteries were patent with antegrade flow.    Review of patient's allergies indicates:   Allergen Reactions    Penicillins Hives and Blisters     No current facility-administered medications on file prior to encounter.     Current Outpatient Medications on File Prior to Encounter   Medication Sig    alprazolam (XANAX) 0.5 MG tablet Take 0.5 mg by mouth 3 (three) times daily as needed.     ARIPiprazole (ABILIFY) 10 MG Tab Take 10 mg by mouth once daily.    cyproheptadine (PERIACTIN) 4 mg tablet     HYDROcodone-acetaminophen (NORCO) 5-325 mg per tablet Take 1 tablet by mouth every 6 (six) hours as needed.    ketoconazole (NIZORAL) 2 % cream Apply topically once daily. for 7 days    naproxen (NAPROSYN) 500 MG tablet TAKE 1 TABLET BY MOUTH WITH FOOD OR MILK AS NEEDED EVERY 12 HOURS AFTER COMPLETING NORCO    omeprazole (PRILOSEC) 40 MG capsule Take 1 capsule (40 mg total) by mouth 2 (two) times daily before meals.    pantoprazole (PROTONIX) 40 MG tablet Take 40 mg by mouth once daily.    paroxetine (PAXIL) 10 MG tablet Take 10 mg by mouth once daily.    paroxetine (PAXIL) 40 MG tablet Take 40 mg by mouth once daily.    promethazine (PHENERGAN) 25 MG tablet Take 1 tablet (25 mg total) by mouth every 6 (six) hours as needed for Nausea.    sucralfate (CARAFATE) 1 gram tablet Take 1 g by mouth 4 (four) times daily.    varenicline (CHANTIX ROSA) 0.5 (11)-1 (42) mg tablet Take one 0.5mg tablet by mouth once daily for 3 days, then increase to one 0.5mg tablet twice daily for 4 days, then increase to one 1mg tablet twice daily.    varenicline (CHANTIX) 1 mg Tab Take 1 tablet (1 mg total) by mouth 2 (two) times daily.    zolpidem (AMBIEN) 10 mg Tab Take 10 mg by mouth nightly as needed.     Review of Systems   Constitutional:  Positive for fatigue. Negative for diaphoresis and fever.    Respiratory:  Negative for chest tightness and shortness of breath.    Cardiovascular:  Negative for chest pain, palpitations and leg swelling.   Neurological:  Positive for dizziness, light-headedness and headaches.   All other systems reviewed and are negative.    Objective:     Vital Signs (Most Recent):  Temp: 97.9 °F (36.6 °C) (10/09/22 0826)  Pulse: 78 (10/09/22 0436)  Resp: 16 (10/09/22 0826)  BP: 120/71 (10/09/22 0826)  SpO2: 96 % (10/09/22 0436) Vital Signs (24h Range):  Temp:  [97.6 °F (36.4 °C)-98.6 °F (37 °C)] 97.9 °F (36.6 °C)  Pulse:  [78-98] 78  Resp:  [15-20] 16  SpO2:  [92 %-98 %] 96 %  BP: (119-145)/(69-91) 120/71     Weight: 44.6 kg (98 lb 5.2 oz)  Body mass index is 16.88 kg/m².    SpO2: 96 %  O2 Device (Oxygen Therapy): (P) room air      Intake/Output Summary (Last 24 hours) at 10/9/2022 0853  Last data filed at 10/8/2022 2200  Gross per 24 hour   Intake 1330.42 ml   Output --   Net 1330.42 ml       Lines/Drains/Airways       Peripheral Intravenous Line  Duration                  Midline Catheter Insertion/Assessment  - Single Lumen 10/08/22 1613 Left basilic vein (medial side of arm) <1 day                  Significant Labs:  Recent Results (from the past 72 hour(s))   Comprehensive metabolic panel    Collection Time: 10/06/22  6:41 PM   Result Value Ref Range    Sodium Level 139 136 - 145 mmol/L    Potassium Level 4.0 3.5 - 5.1 mmol/L    Chloride 109 (H) 98 - 107 mmol/L    Carbon Dioxide 21 (L) 23 - 31 mmol/L    Glucose Level 80 (L) 82 - 115 mg/dL    Blood Urea Nitrogen 31.5 (H) 9.8 - 20.1 mg/dL    Creatinine 0.74 0.55 - 1.02 mg/dL    Calcium Level Total 8.5 8.4 - 10.2 mg/dL    Protein Total 6.3 5.8 - 7.6 gm/dL    Albumin Level 2.0 (L) 3.4 - 4.8 gm/dL    Globulin 4.3 (H) 2.4 - 3.5 gm/dL    Albumin/Globulin Ratio 0.5 (L) 1.1 - 2.0 ratio    Bilirubin Total 0.8 <=1.5 mg/dL    Alkaline Phosphatase 195 (H) 40 - 150 unit/L    Alanine Aminotransferase 26 0 - 55 unit/L    Aspartate Aminotransferase  20 5 - 34 unit/L    eGFR >60 mls/min/1.73/m2   Magnesium    Collection Time: 10/06/22  6:41 PM   Result Value Ref Range    Magnesium Level 1.70 1.60 - 2.60 mg/dL   Troponin I    Collection Time: 10/06/22  6:41 PM   Result Value Ref Range    Troponin-I 0.347 (H) 0.000 - 0.045 ng/mL   CBC with Differential    Collection Time: 10/06/22  6:41 PM   Result Value Ref Range    WBC 18.6 (H) 4.5 - 11.5 x10(3)/mcL    RBC 3.85 (L) 4.20 - 5.40 x10(6)/mcL    Hgb 9.7 (L) 12.0 - 16.0 gm/dL    Hct 32.5 (L) 37.0 - 47.0 %    MCV 84.4 80.0 - 94.0 fL    MCH 25.2 (L) 27.0 - 31.0 pg    MCHC 29.8 (L) 33.0 - 36.0 mg/dL    RDW 17.9 (H) 11.5 - 17.0 %    Platelet 133 130 - 400 x10(3)/mcL    MPV      Neut % 89.2 %    Lymph % 5.1 %    Mono % 3.5 %    Eos % 0.3 %    Basophil % 0.4 %    Lymph # 0.94 0.6 - 4.6 x10(3)/mcL    Neut # 16.6 (H) 2.1 - 9.2 x10(3)/mcL    Mono # 0.66 0.1 - 1.3 x10(3)/mcL    Eos # 0.06 0 - 0.9 x10(3)/mcL    Baso # 0.08 0 - 0.2 x10(3)/mcL    IG# 0.28 (H) 0 - 0.04 x10(3)/mcL    IG% 1.5 %    NRBC% 0.1 %   Blood culture #1 **CANNOT BE ORDERED STAT**    Collection Time: 10/06/22  9:29 PM    Specimen: Blood   Result Value Ref Range    CULTURE, BLOOD ([facility]) No Growth At 48 Hours    Blood culture #2 **CANNOT BE ORDERED STAT**    Collection Time: 10/06/22  9:29 PM    Specimen: Blood   Result Value Ref Range    CULTURE, BLOOD ([facility]) No Growth At 48 Hours    Urinalysis, Reflex to Urine Culture Urine, Clean Catch    Collection Time: 10/06/22  9:46 PM    Specimen: Urine   Result Value Ref Range    Color, UA Yellow Yellow, Colorless, Other, Clear    Appearance, UA Cloudy (A) Clear    Specific Gravity, UA 1.016 1.001 - 1.030    pH, UA 5.5 5.0, 5.5, 6.0, 6.5, 7.0, 7.5, 8.0, 8.5    Protein, UA 1+ (A) Negative mg/dL    Glucose, UA Negative Negative, Normal mg/dL    Ketones, UA Negative Negative mg/dL    Blood, UA 2+ (A) Negative unit/L    Bilirubin, UA Negative Negative mg/dL    Urobilinogen, UA 1.0 0.2, 1.0, Normal mg/dL    Nitrites, UA  Positive (A) Negative    Leukocyte Esterase, UA 2+ (A) Negative unit/L   Urinalysis, Microscopic    Collection Time: 10/06/22  9:46 PM   Result Value Ref Range    RBC, UA 14 (H) <=5 /HPF    WBC, UA 30 (H) <=5 /HPF    Squamous Epithelial Cells, UA <5 <=5 /HPF    Bacteria, UA 4+ (A) None Seen, Rare, Occasional /HPF   Urine culture    Collection Time: 10/06/22  9:46 PM    Specimen: Urine   Result Value Ref Range    Urine Culture >/= 100,000 colonies/ml Gram-negative Rods (A)    Troponin I    Collection Time: 10/07/22 12:00 AM   Result Value Ref Range    Troponin-I 0.370 (H) 0.000 - 0.045 ng/mL   Iron and TIBC    Collection Time: 10/07/22 12:00 AM   Result Value Ref Range    Iron Binding Capacity Unsaturated 168 70 - 310 ug/dL    Iron Level 10 (L) 50 - 170 ug/dL    Transferrin 158 (L) 173 - 360 mg/dL    Iron Binding Capacity Total 178 (L) 250 - 450 ug/dL    Iron Saturation 6 (L) 20 - 50 %   Ferritin    Collection Time: 10/07/22 12:00 AM   Result Value Ref Range    Ferritin Level 184.35 4.63 - 204.00 ng/mL   Vitamin B12    Collection Time: 10/07/22 12:00 AM   Result Value Ref Range    Vitamin B12 Level >2,000 (H) 213 - 816 pg/mL   C-Reactive Protein    Collection Time: 10/07/22 12:00 AM   Result Value Ref Range    C-Reactive Protein 208.90 (H) <5.00 mg/L   TSH    Collection Time: 10/07/22 12:00 AM   Result Value Ref Range    Thyroid Stimulating Hormone 3.2412 0.3500 - 4.9400 uIU/mL   Troponin I    Collection Time: 10/07/22  7:15 AM   Result Value Ref Range    Troponin-I 0.386 (H) 0.000 - 0.045 ng/mL   Basic Metabolic Panel    Collection Time: 10/07/22  7:15 AM   Result Value Ref Range    Sodium Level 140 136 - 145 mmol/L    Potassium Level 3.3 (L) 3.5 - 5.1 mmol/L    Chloride 111 (H) 98 - 107 mmol/L    Carbon Dioxide 20 (L) 23 - 31 mmol/L    Glucose Level 62 (L) 82 - 115 mg/dL    Blood Urea Nitrogen 23.2 (H) 9.8 - 20.1 mg/dL    Creatinine 0.78 0.55 - 1.02 mg/dL    BUN/Creatinine Ratio 30     Calcium Level Total 8.3 (L)  8.4 - 10.2 mg/dL    Anion Gap 9.0 mEq/L    eGFR >60 mls/min/1.73/m2   Phosphorus    Collection Time: 10/07/22  7:15 AM   Result Value Ref Range    Phosphorus Level 2.9 2.3 - 4.7 mg/dL   Magnesium    Collection Time: 10/07/22  7:15 AM   Result Value Ref Range    Magnesium Level 1.70 1.60 - 2.60 mg/dL   Lactic Acid, Plasma    Collection Time: 10/07/22  7:15 AM   Result Value Ref Range    Lactic Acid Level 2.2 0.5 - 2.2 mmol/L   Folate    Collection Time: 10/07/22  7:15 AM   Result Value Ref Range    Folate Level 8.9 7.0 - 31.4 ng/mL   D-Dimer, Quantitative    Collection Time: 10/07/22  7:15 AM   Result Value Ref Range    D-Dimer 2.59 (H) 0.00 - 0.50 ug/mL FEU   Hemoglobin A1C    Collection Time: 10/07/22  7:15 AM   Result Value Ref Range    Hemoglobin A1c 5.9 <=7.0 %    Estimated Average Glucose 122.6 mg/dL   CV Ultrasound Bilateral Doppler Carotid    Collection Time: 10/07/22  8:18 AM   Result Value Ref Range    Left ICA/CCA ratio 1.18     Right ICA/CCA ratio 0.91     Left Highest ICA 93.00     Left Highest CCA 80     Right Highest ICA 61.00     Right Highest CCA 73     Right Highest EDV 15.00     LT Highest EDV 56.00     Right CCA prox sys 73 cm/s    Right CCA prox cordero 16 cm/s    Right CCA dist sys 67 cm/s    Right CCA dist cordero 16 cm/s    Right ICA prox sys 46 cm/s    Right ICA prox cordero 12 cm/s    Right ICA mid sys 58 cm/s    Right ICA mid cordero 15 cm/s    Right ICA dist sys 61 cm/s    Right ICA dist cordero 13 cm/s    Right ECA sys 90 cm/s    Right vertebral sys 75 cm/s    Left CCA prox sys 80 cm/s    Left CCA prox cordero 13 cm/s    Left CCA dist sys 79 cm/s    Left CCA dist cordero 11 cm/s    Left ICA prox sys 62 cm/s    Left ICA prox cordero 12 cm/s    Left ICA mid sys 93 cm/s    Left ICA mid cordero 56 cm/s    Left ICA dist sys 77 cm/s    Left ICA dist cordero 29 cm/s    Left ECA sys 80 cm/s    Left vertebral sys 100 cm/s    Right vertebral cordero 14 cm/s    Right ECA cordero 7 cm/s    Left vertebral cordero 17 cm/s    Left ECA cordero  15 cm/s   Echo    Collection Time: 10/07/22  8:55 AM   Result Value Ref Range    BSA 1.42 m2    Right Atrial Pressure (from IVC) 3 mmHg    EF 57 %    Left Ventricular Outflow Tract Mean Velocity 0.67 cm/s    Left Ventricular Outflow Tract Mean Gradient 2.00 mmHg    PV PEAK VELOCITY 0.87 cm/s    LVIDd 4.27 3.5 - 6.0 cm    IVS 0.78 0.6 - 1.1 cm    Posterior Wall 0.90 0.6 - 1.1 cm    LVIDs 2.69 2.1 - 4.0 cm    FS 37 28 - 44 %    LV mass 111.08 g    LA size 2.90 cm    TAPSE 2.17 cm    Left Ventricle Relative Wall Thickness 0.42 cm    AV mean gradient 7 mmHg    AV valve area 1.67 cm2    AV Velocity Ratio 0.56     AV index (prosthetic) 0.53     MV mean gradient 3 mmHg    MV valve area p 1/2 method 5.64 cm2    MV valve area by continuity eq 1.91 cm2    E/A ratio 0.77     E wave deceleration time 156.00 msec    LVOT diameter 2.00 cm    LVOT area 3.1 cm2    LVOT peak martin 1.00 m/s    LVOT peak VTI 15.20 cm    Ao peak martin 1.79 m/s    Ao VTI 28.6 cm    Vn Nyquist 0.39 m/s    Radius 0.80 cm    Mr max martin 5.70 m/s    LVOT stroke volume 47.73 cm3    AV peak gradient 13 mmHg    MV peak gradient 5 mmHg    TV rest pulmonary artery pressure 35 mmHg    Vn Nyquist MS 0.39 m/s    MV Peak E Martin 0.88 m/s    MR PISA EROA 0.27 cm2    TR Max Martin 2.83 m/s    MV VTI 25.0 cm    MV stenosis pressure 1/2 time 39.00 ms    MV Peak A Martin 1.14 m/s    LV Systolic Volume 26.80 mL    LV Systolic Volume Index 18.5 mL/m2    LV Diastolic Volume 81.70 mL    LV Diastolic Volume Index 56.34 mL/m2    LV Mass Index 77 g/m2    Triscuspid Valve Regurgitation Peak Gradient 32 mmHg    LA Volume Index (Mod) 19.4 mL/m2    LA volume (mod) 28.10 cm3   CBC with Differential    Collection Time: 10/07/22 10:26 AM   Result Value Ref Range    WBC 15.1 (H) 4.5 - 11.5 x10(3)/mcL    RBC 3.35 (L) 4.20 - 5.40 x10(6)/mcL    Hgb 8.3 (L) 12.0 - 16.0 gm/dL    Hct 28.1 (L) 37.0 - 47.0 %    MCV 83.9 80.0 - 94.0 fL    MCH 24.8 (L) 27.0 - 31.0 pg    MCHC 29.5 (L) 33.0 - 36.0 mg/dL    RDW  17.8 (H) 11.5 - 17.0 %    Platelet 142 130 - 400 x10(3)/mcL    MPV 11.8 (H) 7.4 - 10.4 fL    Neut % 89.6 %    Lymph % 5.0 %    Mono % 3.9 %    Eos % 0.2 %    Basophil % 0.2 %    Lymph # 0.76 0.6 - 4.6 x10(3)/mcL    Neut # 13.5 (H) 2.1 - 9.2 x10(3)/mcL    Mono # 0.59 0.1 - 1.3 x10(3)/mcL    Eos # 0.03 0 - 0.9 x10(3)/mcL    Baso # 0.03 0 - 0.2 x10(3)/mcL    IG# 0.17 (H) 0 - 0.04 x10(3)/mcL    IG% 1.1 %    NRBC% 0.1 %   Sedimentation rate    Collection Time: 10/07/22 10:26 AM   Result Value Ref Range    Sed Rate 73 (H) 0 - 20 mm/hr   COVID/FLU A&B PCR    Collection Time: 10/07/22 10:55 AM   Result Value Ref Range    Influenza A PCR Not Detected Not Detected    Influenza B PCR Not Detected Not Detected    SARS-CoV-2 PCR Not Detected Not Detected   Troponin I    Collection Time: 10/07/22 12:26 PM   Result Value Ref Range    Troponin-I 0.361 (H) 0.000 - 0.045 ng/mL   Lactic Acid, Plasma    Collection Time: 10/07/22 12:26 PM   Result Value Ref Range    Lactic Acid Level 1.6 0.5 - 2.2 mmol/L   Lipid Panel    Collection Time: 10/07/22 12:26 PM   Result Value Ref Range    Cholesterol Total 91 <=200 mg/dL    HDL Cholesterol 36 35 - 60 mg/dL    Triglyceride 85 37 - 140 mg/dL    Cholesterol/HDL Ratio 3 0 - 5    Very Low Density Lipoprotein 17     LDL Cholesterol 38.00 (L) 50.00 - 140.00 mg/dL   Type & Screen    Collection Time: 10/07/22  3:53 PM   Result Value Ref Range    Group & Rh O NEG     Indirect Bing GEL NEG    APTT    Collection Time: 10/07/22  3:55 PM   Result Value Ref Range    PTT 31.1 23.2 - 33.7 seconds   Hemoglobin and Hematocrit    Collection Time: 10/07/22  3:55 PM   Result Value Ref Range    Hgb 7.4 (L) 12.0 - 16.0 gm/dL    Hct 24.5 (L) 37.0 - 47.0 %   Comprehensive Metabolic Panel    Collection Time: 10/08/22  4:18 AM   Result Value Ref Range    Sodium Level 137 136 - 145 mmol/L    Potassium Level 3.9 3.5 - 5.1 mmol/L    Chloride 109 (H) 98 - 107 mmol/L    Carbon Dioxide 19 (L) 23 - 31 mmol/L    Glucose Level  70 (L) 82 - 115 mg/dL    Blood Urea Nitrogen 11.6 9.8 - 20.1 mg/dL    Creatinine 0.66 0.55 - 1.02 mg/dL    Calcium Level Total 7.9 (L) 8.4 - 10.2 mg/dL    Protein Total 5.3 (L) 5.8 - 7.6 gm/dL    Albumin Level 1.6 (L) 3.4 - 4.8 gm/dL    Globulin 3.7 (H) 2.4 - 3.5 gm/dL    Albumin/Globulin Ratio 0.4 (L) 1.1 - 2.0 ratio    Bilirubin Total 0.6 <=1.5 mg/dL    Alkaline Phosphatase 150 40 - 150 unit/L    Alanine Aminotransferase 19 0 - 55 unit/L    Aspartate Aminotransferase 16 5 - 34 unit/L    eGFR >60 mls/min/1.73/m2   Magnesium    Collection Time: 10/08/22  4:18 AM   Result Value Ref Range    Magnesium Level 1.30 (L) 1.60 - 2.60 mg/dL   CBC with Differential    Collection Time: 10/08/22  9:03 AM   Result Value Ref Range    WBC 14.6 (H) 4.5 - 11.5 x10(3)/mcL    RBC 3.13 (L) 4.20 - 5.40 x10(6)/mcL    Hgb 7.7 (L) 12.0 - 16.0 gm/dL    Hct 26.3 (L) 37.0 - 47.0 %    MCV 84.0 80.0 - 94.0 fL    MCH 24.6 (L) 27.0 - 31.0 pg    MCHC 29.3 (L) 33.0 - 36.0 mg/dL    RDW 17.8 (H) 11.5 - 17.0 %    Platelet 193 130 - 400 x10(3)/mcL    MPV 11.7 (H) 7.4 - 10.4 fL    Neut % 84.1 %    Lymph % 7.1 %    Mono % 6.3 %    Eos % 0.3 %    Basophil % 0.2 %    Lymph # 1.04 0.6 - 4.6 x10(3)/mcL    Neut # 12.3 (H) 2.1 - 9.2 x10(3)/mcL    Mono # 0.92 0.1 - 1.3 x10(3)/mcL    Eos # 0.04 0 - 0.9 x10(3)/mcL    Baso # 0.03 0 - 0.2 x10(3)/mcL    IG# 0.29 (H) 0 - 0.04 x10(3)/mcL    IG% 2.0 %    NRBC% 0.1 %   Type & Screen    Collection Time: 10/08/22  2:10 PM   Result Value Ref Range    Group & Rh O NEG     Indirect Bing GEL NEG    Prepare RBC 2 Units; low H&H    Collection Time: 10/08/22  2:10 PM   Result Value Ref Range    UNIT NUMBER [id number]     UNIT ABO/RH O NEG     DISPENSE STATUS Transfused     Unit Expiration [id number]     Product Code L2366X26     Unit Blood Type Code 9500     CROSSMATCH INTERPRETATION Compatible     UNIT NUMBER [id number]     UNIT ABO/RH O NEG     DISPENSE STATUS Transfused     Unit Expiration [id number]     Product  Code F6856O30     Unit Blood Type Code 9500     CROSSMATCH INTERPRETATION Compatible    Magnesium    Collection Time: 10/09/22  6:05 AM   Result Value Ref Range    Magnesium Level 1.30 (L) 1.60 - 2.60 mg/dL   Basic Metabolic Panel    Collection Time: 10/09/22  6:05 AM   Result Value Ref Range    Sodium Level 138 136 - 145 mmol/L    Potassium Level 3.7 3.5 - 5.1 mmol/L    Chloride 104 98 - 107 mmol/L    Carbon Dioxide 25 23 - 31 mmol/L    Glucose Level 76 (L) 82 - 115 mg/dL    Blood Urea Nitrogen 9.9 9.8 - 20.1 mg/dL    Creatinine 0.66 0.55 - 1.02 mg/dL    BUN/Creatinine Ratio 15     Calcium Level Total 8.0 (L) 8.4 - 10.2 mg/dL    Anion Gap 9.0 mEq/L    eGFR >60 mls/min/1.73/m2     Significant Imaging:  Imaging Results              X-Ray Chest PA And Lateral (Final result)  Result time 10/07/22 06:15:11      Final result by Arnoldo Grant MD (10/07/22 06:15:11)                   Impression:      Right greater than left lung consolidation compatible with atypical infection      Electronically signed by: Arnoldo Grant MD  Date:    10/07/2022  Time:    06:15               Narrative:    EXAMINATION:  XR CHEST PA AND LATERAL    CLINICAL HISTORY:  Weakness    COMPARISON:  07/07/2021    FINDINGS:  PA and lateral views of the chest show patchy consolidation in the right greater than left lung bases.  No pneumothorax or large effusion.  Aorta is partially calcified.  Cardiac silhouette and pulmonary vasculature are normal.  No acute osseous findings.                                       CT Head Without Contrast (Final result)  Result time 10/06/22 19:32:02      Final result by Arnoldo Grant MD (10/06/22 19:32:02)                   Impression:      No acute intracranial findings.      Electronically signed by: Arnoldo Grant MD  Date:    10/06/2022  Time:    19:32               Narrative:    EXAMINATION:  CT HEAD WITHOUT CONTRAST    CLINICAL HISTORY:  Dizziness for 2 weeks    TECHNIQUE:  Axial CT images were obtained  through the head without contrast.  Coronal and sagittal reformations were also performed.  Total .  Automated exposure control utilized.    COMPARISON:  None.    FINDINGS:  No hemorrhage, mass effect or CT evidence of acute cortical infarction.  White-matter hypodensities are nonspecific but likely related to small vessel ischemic disease.  Chronic lacunar infarcts are in the right basal ganglia and right cerebellum.  Ventricles and sulci are proportionate.    No abnormal extra-axial fluid collections.    No hyperdense artery or vein.    No skull fracture or aggressive osseous process.  Visualized paranasal sinuses and mastoid air cells are clear.                                    EKG:    Results for orders placed or performed during the hospital encounter of 10/06/22   EKG 12-lead    Narrative    Test Reason : R42,    Vent. Rate : 088 BPM     Atrial Rate : 088 BPM     P-R Int : 114 ms          QRS Dur : 076 ms      QT Int : 346 ms       P-R-T Axes : 076 057 051 degrees     QTc Int : 418 ms    Normal sinus rhythm  Right atrial enlargement  Nonspecific ST abnormality  Abnormal ECG  No previous ECGs available  Confirmed by Chas Gibson MD ([id number]) on 10/7/2022 5:34:57 AM    Referred By: RAMSES   SELF           Confirmed By:Chas Gibson MD     Telemetry:  SR 70s-80s    Physical Exam  Constitutional:       Appearance: She is ill-appearing.      Comments: Cachexia   HENT:      Head: Normocephalic.      Mouth/Throat:      Mouth: Mucous membranes are moist.   Eyes:      Extraocular Movements: Extraocular movements intact.      Pupils: Pupils are equal, round, and reactive to light.   Cardiovascular:      Rate and Rhythm: Normal rate and regular rhythm.      Pulses: Normal pulses.   Pulmonary:      Effort: Pulmonary effort is normal.      Breath sounds: Normal breath sounds.   Abdominal:      Palpations: Abdomen is soft.   Musculoskeletal:         General: No swelling. Normal range of motion.   Skin:     General:  Skin is warm and dry.      Comments: BUE/BLE Ecchymosis at Various Stages of Healing - Likely Secondary to Multiple/Frequent Falls   Neurological:      General: No focal deficit present.      Mental Status: She is alert and oriented to person, place, and time.      Comments: Dizziness and Ataxia - reported by Nursing Staff   Psychiatric:         Mood and Affect: Mood normal.         Behavior: Behavior normal.     Home Medications:   No current facility-administered medications on file prior to encounter.     Current Outpatient Medications on File Prior to Encounter   Medication Sig Dispense Refill    alprazolam (XANAX) 0.5 MG tablet Take 0.5 mg by mouth 3 (three) times daily as needed.       ARIPiprazole (ABILIFY) 10 MG Tab Take 10 mg by mouth once daily.      cyproheptadine (PERIACTIN) 4 mg tablet       HYDROcodone-acetaminophen (NORCO) 5-325 mg per tablet Take 1 tablet by mouth every 6 (six) hours as needed.      ketoconazole (NIZORAL) 2 % cream Apply topically once daily. for 7 days 60 g 0    naproxen (NAPROSYN) 500 MG tablet TAKE 1 TABLET BY MOUTH WITH FOOD OR MILK AS NEEDED EVERY 12 HOURS AFTER COMPLETING NORCO      omeprazole (PRILOSEC) 40 MG capsule Take 1 capsule (40 mg total) by mouth 2 (two) times daily before meals. 60 capsule 2    pantoprazole (PROTONIX) 40 MG tablet Take 40 mg by mouth once daily.      paroxetine (PAXIL) 10 MG tablet Take 10 mg by mouth once daily.      paroxetine (PAXIL) 40 MG tablet Take 40 mg by mouth once daily.      promethazine (PHENERGAN) 25 MG tablet Take 1 tablet (25 mg total) by mouth every 6 (six) hours as needed for Nausea. 15 tablet 0    sucralfate (CARAFATE) 1 gram tablet Take 1 g by mouth 4 (four) times daily.      varenicline (CHANTIX ROSA) 0.5 (11)-1 (42) mg tablet Take one 0.5mg tablet by mouth once daily for 3 days, then increase to one 0.5mg tablet twice daily for 4 days, then increase to one 1mg tablet twice daily. 42 tablet 0    varenicline (CHANTIX) 1 mg Tab Take 1  tablet (1 mg total) by mouth 2 (two) times daily. 60 tablet 3    zolpidem (AMBIEN) 10 mg Tab Take 10 mg by mouth nightly as needed.       Current Inpatient Medications:    Current Facility-Administered Medications:     0.9%  NaCl infusion (for blood administration), , Intravenous, Q24H PRN, Dc Castrejon MD    0.9%  NaCl infusion (for blood administration), , Intravenous, Q24H PRN, Dc Castrejon MD    0.9%  NaCl infusion (for blood administration), , Intravenous, Q24H PRN, Dc Castrejon MD, New Bag at 10/09/22 0200    acetaminophen tablet 1,000 mg, 1,000 mg, Oral, Q6H PRN, Timothy Shipley MD    acetaminophen tablet 650 mg, 650 mg, Oral, Q4H PRN, Timothy Shipley MD    albuterol-ipratropium 2.5 mg-0.5 mg/3 mL nebulizer solution 3 mL, 3 mL, Nebulization, Q4H PRN, NELI Singh    aluminum-magnesium hydroxide-simethicone 200-200-20 mg/5 mL suspension 30 mL, 30 mL, Oral, QID PRN, Timothy Shipley MD    atorvastatin tablet 40 mg, 40 mg, Oral, Daily, Timothy Shipley MD, 40 mg at 10/08/22 1110    AZITHROMYCIN 500 MG/250 ML D5W (READY TO MIX SYSTEM) 500 mg IVPB, 500 mg, Intravenous, Q24H, NELI Singh, Stopped at 10/08/22 2247    cefTRIAXone (ROCEPHIN) 1 g in dextrose 5 % in water (D5W) 5 % 50 mL IVPB (MB+), 1 g, Intravenous, Q24H, NELI Singh, Stopped at 10/09/22 0229    ferric gluconate (FERRLECIT) 125 mg in sodium chloride 0.9% 100 mL IVPB, 125 mg, Intravenous, Daily, Timothy Shipley MD, Stopped at 10/08/22 1211    HYDROcodone-acetaminophen 5-325 mg per tablet 1 tablet, 1 tablet, Oral, Once, NELI Singh    melatonin tablet 6 mg, 6 mg, Oral, Nightly PRN, Timothy Shipley MD, 6 mg at 10/08/22 2049    multivitamin tablet, 1 tablet, Oral, Daily, Timothy Shipley MD, 1 tablet at 10/08/22 1110    nicotine 14 mg/24 hr 1 patch, 1 patch, Transdermal, Daily, NELI Singh, 1 patch at 10/07/22 1033    nystatin-triamcinolone cream, , Topical (Top), TID, Timothy Shipley MD, Given at 10/07/22 2100    ondansetron injection 4  mg, 4 mg, Intravenous, Q4H PRN, Timothy Shipley MD    pantoprazole injection 40 mg, 40 mg, Intravenous, BID, NELI Singh, 40 mg at 10/08/22 2050    polyethylene glycol packet 17 g, 17 g, Oral, BID PRN, Timothy Shipley MD    prochlorperazine injection Soln 5 mg, 5 mg, Intravenous, Q6H PRN, Timothy Shipley MD    senna-docusate 8.6-50 mg per tablet 1 tablet, 1 tablet, Oral, BID PRN, Timothy Shipley MD    simethicone chewable tablet 80 mg, 1 tablet, Oral, QID PRN, Timothy Shipley MD    sodium chloride 0.9% flush 10 mL, 10 mL, Intravenous, PRN, Timothy Shipley MD    Flushing PICC Protocol, , , Until Discontinued **AND** sodium chloride 0.9% flush 10 mL, 10 mL, Intravenous, Q6H, 10 mL at 10/09/22 0200 **AND** sodium chloride 0.9% flush 10 mL, 10 mL, Intravenous, PRN, Timothy Shipley MD    traMADoL tablet 50 mg, 50 mg, Oral, Q6H PRN, Dc Castrejon MD, 50 mg at 10/08/22 2049    zinc oxide-cod liver oil 40 % paste, , Topical (Top), PRN, NELI Singh, Given at 10/08/22 2050    VTE Risk Mitigation (From admission, onward)           Ordered     IP VTE LOW RISK PATIENT  Once         10/07/22 0815     Place sequential compression device  Until discontinued         10/07/22 0815                  Assessment:   SIRS/Sepsis?    - Blood Cultures x 2 - Negative at 48 Hours  UTI/E.Coli (POA)  CAP/Right Lung (POA)  NSTEMI (Flat) - Likely Type II in the Setting of Sepsis/UTI/CAP/CVA  Chronic/Old CVA    - Recurrent Syncope, Falls and Gait Ataxia with Chronic Cerebellar Infarct  Acute CVA    - MRI Brain (10.7.22) - Several Acute Non-Hemorrhagic Lacunar Infarcts which involved Bilateral Cerebellar Hemispheres, R Aspect of the Nani and L Occipital Lobe, R Basal Ganglia and Bilateral Frontoparietal Lobes; Old Lacunar Infarcts which involve Bilateral Cerebellar Hemispheres and the R Corona Radiata  Anemia  Chronic Tobacco Use  GERD/PUD Rupture/Repair  Hx of GIB    Plan:   ECHO Reviewed  Troponin Series Flat and Mildly Elevated in the Setting of Acute  Sepsis/UTI/CAP and Acute on Chronic CVA - Type II Demand Ischemia and without RWMAs on ECHO  Continue Current Treatment per Primary Team  F/U with [facility] in 1-2 Weeks upon D/C  Plan for OP Ischemic Evaluation upon F/U  We will be available as needed    Thank you for your consult.     Mukul Dailey, YFN  Cardiology  [facility] - 6th Floor Medical Telemetry  10/09/2022 8:53 AM

## 2022-10-09 NOTE — PLAN OF CARE
Problem: Adult Inpatient Plan of Care  Goal: Plan of Care Review  Outcome: Ongoing, Progressing  Goal: Patient-Specific Goal (Individualized)  Outcome: Ongoing, Progressing  Goal: Absence of Hospital-Acquired Illness or Injury  Outcome: Ongoing, Progressing  Goal: Optimal Comfort and Wellbeing  Outcome: Ongoing, Progressing  Goal: Readiness for Transition of Care  Outcome: Ongoing, Progressing     Problem: Fall Injury Risk  Goal: Absence of Fall and Fall-Related Injury  Outcome: Ongoing, Progressing     Problem: Skin Injury Risk Increased  Goal: Skin Health and Integrity  Outcome: Ongoing, Progressing     Problem: Impaired Wound Healing  Goal: Optimal Wound Healing  Outcome: Ongoing, Progressing     Problem: Adjustment to Illness (Stroke, Ischemic/Transient Ischemic Attack)  Goal: Optimal Coping  Outcome: Ongoing, Progressing     Problem: Bowel Elimination Impaired (Stroke, Ischemic/Transient Ischemic Attack)  Goal: Effective Bowel Elimination  Outcome: Ongoing, Progressing     Problem: Cerebral Tissue Perfusion (Stroke, Ischemic/Transient Ischemic Attack)  Goal: Optimal Cerebral Tissue Perfusion  Outcome: Ongoing, Progressing     Problem: Cognitive Impairment (Stroke, Ischemic/Transient Ischemic Attack)  Goal: Optimal Cognitive Function  Outcome: Ongoing, Progressing     Problem: Communication Impairment (Stroke, Ischemic/Transient Ischemic Attack)  Goal: Improved Communication Skills  Outcome: Ongoing, Progressing     Problem: Functional Ability Impaired (Stroke, Ischemic/Transient Ischemic Attack)  Goal: Optimal Functional Ability  Outcome: Ongoing, Progressing     Problem: Respiratory Compromise (Stroke, Ischemic/Transient Ischemic Attack)  Goal: Effective Oxygenation and Ventilation  Outcome: Ongoing, Progressing     Problem: Sensorimotor Impairment (Stroke, Ischemic/Transient Ischemic Attack)  Goal: Improved Sensorimotor Function  Outcome: Ongoing, Progressing     Problem: Swallowing Impairment (Stroke,  Ischemic/Transient Ischemic Attack)  Goal: Optimal Eating and Swallowing without Aspiration  Outcome: Ongoing, Progressing     Problem: Urinary Elimination Impaired (Stroke, Ischemic/Transient Ischemic Attack)  Goal: Effective Urinary Elimination  Outcome: Ongoing, Progressing     Problem: Infection  Goal: Absence of Infection Signs and Symptoms  Outcome: Ongoing, Progressing     Problem: UTI (Urinary Tract Infection)  Goal: Improved Infection Symptoms  Outcome: Ongoing, Progressing     Problem: Fluid Imbalance (Pneumonia)  Goal: Fluid Balance  Outcome: Ongoing, Progressing     Problem: Infection (Pneumonia)  Goal: Resolution of Infection Signs and Symptoms  Outcome: Ongoing, Progressing     Problem: Respiratory Compromise (Pneumonia)  Goal: Effective Oxygenation and Ventilation  Outcome: Ongoing, Progressing

## 2022-10-10 ENCOUNTER — ANESTHESIA EVENT (OUTPATIENT)
Dept: ENDOSCOPY | Facility: HOSPITAL | Age: 67
DRG: 871 | End: 2022-10-10
Payer: MEDICARE

## 2022-10-10 ENCOUNTER — ANESTHESIA (OUTPATIENT)
Dept: ENDOSCOPY | Facility: HOSPITAL | Age: 67
DRG: 871 | End: 2022-10-10
Payer: MEDICARE

## 2022-10-10 LAB
ANION GAP SERPL CALC-SCNC: 10 MEQ/L
B PARAP IS1001 DNA CT SPEC QN NAA+PROBE: NOT DETECTED
B PERT+PARAP PTXS1 CT SPEC QN NAA+PROBE: NOT DETECTED
BASOPHILS # BLD AUTO: 0.06 X10(3)/MCL (ref 0–0.2)
BASOPHILS NFR BLD AUTO: 0.5 %
BUN SERPL-MCNC: 11.1 MG/DL (ref 9.8–20.1)
CALCIUM SERPL-MCNC: 7.8 MG/DL (ref 8.4–10.2)
CHLAMYDIA SP IGG+IGM PNL TITR SER IF: NOT DETECTED
CHLORIDE SERPL-SCNC: 105 MMOL/L (ref 98–107)
CO2 SERPL-SCNC: 23 MMOL/L (ref 23–31)
CREAT SERPL-MCNC: 0.96 MG/DL (ref 0.55–1.02)
CREAT/UREA NIT SERPL: 12
EOSINOPHIL # BLD AUTO: 0.07 X10(3)/MCL (ref 0–0.9)
EOSINOPHIL NFR BLD AUTO: 0.5 %
ERYTHROCYTE [DISTWIDTH] IN BLOOD BY AUTOMATED COUNT: 16.9 % (ref 11.5–17)
FLUAV AG UPPER RESP QL IA.RAPID: NOT DETECTED
FLUAV H1 2009 RNA SPEC NAA+PROBE-IMP: NORMAL
FLUAV H3 HA GENE NPH QL NAA+PROBE: NORMAL
FLUBV AG UPPER RESP QL IA.RAPID: NOT DETECTED
GFR SERPLBLD CREATININE-BSD FMLA CKD-EPI: >60 MLS/MIN/1.73/M2
GLUCOSE SERPL-MCNC: 86 MG/DL (ref 82–115)
HADV DNA NPH QL NAA+NON-PROBE: NOT DETECTED
HCOV 229E+OC43 RNA NPH QL NAA+PROBE: NOT DETECTED
HCOV HKU1 RNA SPEC QL NAA+PROBE: NOT DETECTED
HCOV NL63 RNA SPEC QL NAA+PROBE: NOT DETECTED
HCOV OC43 RNA SPEC QL NAA+PROBE: NOT DETECTED
HCT VFR BLD AUTO: 34.4 % (ref 37–47)
HGB BLD-MCNC: 10.6 GM/DL (ref 12–16)
HMPV RNA SPEC QL NAA+PROBE: NOT DETECTED
HPIV1 F GENE NPH QL NAA+PROBE: NOT DETECTED
HPIV2 L GENE NPH QL NAA+PROBE: NOT DETECTED
HPIV3 NP GENE NPH QL NAA+PROBE: NOT DETECTED
HPIV4 P GENE NPH QL NAA+PROBE: NOT DETECTED
IMM GRANULOCYTES # BLD AUTO: 0.43 X10(3)/MCL (ref 0–0.04)
IMM GRANULOCYTES NFR BLD AUTO: 3.3 %
LYMPHOCYTES # BLD AUTO: 1.27 X10(3)/MCL (ref 0.6–4.6)
LYMPHOCYTES NFR BLD AUTO: 9.6 %
M PNEUMO IGA SER-ACNC: NOT DETECTED
MAGNESIUM SERPL-MCNC: 1.8 MG/DL (ref 1.6–2.6)
MCH RBC QN AUTO: 26 PG (ref 27–31)
MCHC RBC AUTO-ENTMCNC: 30.8 MG/DL (ref 33–36)
MCV RBC AUTO: 84.3 FL (ref 80–94)
MONOCYTES # BLD AUTO: 0.89 X10(3)/MCL (ref 0.1–1.3)
MONOCYTES NFR BLD AUTO: 6.8 %
NEUTROPHILS # BLD AUTO: 10.5 X10(3)/MCL (ref 2.1–9.2)
NEUTROPHILS NFR BLD AUTO: 79.3 %
NRBC BLD AUTO-RTO: 0 %
PLATELET # BLD AUTO: 226 X10(3)/MCL (ref 130–400)
PMV BLD AUTO: 11.6 FL (ref 7.4–10.4)
POTASSIUM SERPL-SCNC: 3.8 MMOL/L (ref 3.5–5.1)
RBC # BLD AUTO: 4.08 X10(6)/MCL (ref 4.2–5.4)
RHINOVIRUS RNA SPEC NAA+PROBE: NOT DETECTED
RSV A 5' UTR RNA NPH QL NAA+PROBE: NOT DETECTED
SODIUM SERPL-SCNC: 138 MMOL/L (ref 136–145)
WBC # SPEC AUTO: 13.2 X10(3)/MCL (ref 4.5–11.5)

## 2022-10-10 PROCEDURE — 37000009 HC ANESTHESIA EA ADD 15 MINS: Performed by: INTERNAL MEDICINE

## 2022-10-10 PROCEDURE — A4216 STERILE WATER/SALINE, 10 ML: HCPCS | Performed by: INTERNAL MEDICINE

## 2022-10-10 PROCEDURE — 80048 BASIC METABOLIC PNL TOTAL CA: CPT | Performed by: INTERNAL MEDICINE

## 2022-10-10 PROCEDURE — 63600175 PHARM REV CODE 636 W HCPCS: Performed by: NURSE PRACTITIONER

## 2022-10-10 PROCEDURE — 92523 SPEECH SOUND LANG COMPREHEN: CPT

## 2022-10-10 PROCEDURE — 25000003 PHARM REV CODE 250: Performed by: INTERNAL MEDICINE

## 2022-10-10 PROCEDURE — 63600175 PHARM REV CODE 636 W HCPCS: Performed by: INTERNAL MEDICINE

## 2022-10-10 PROCEDURE — 36415 COLL VENOUS BLD VENIPUNCTURE: CPT | Performed by: INTERNAL MEDICINE

## 2022-10-10 PROCEDURE — C9113 INJ PANTOPRAZOLE SODIUM, VIA: HCPCS | Performed by: NURSE PRACTITIONER

## 2022-10-10 PROCEDURE — 97116 GAIT TRAINING THERAPY: CPT | Mod: CQ

## 2022-10-10 PROCEDURE — 43235 EGD DIAGNOSTIC BRUSH WASH: CPT | Performed by: INTERNAL MEDICINE

## 2022-10-10 PROCEDURE — 21400001 HC TELEMETRY ROOM

## 2022-10-10 PROCEDURE — 85025 COMPLETE CBC W/AUTO DIFF WBC: CPT | Performed by: INTERNAL MEDICINE

## 2022-10-10 PROCEDURE — 83735 ASSAY OF MAGNESIUM: CPT | Performed by: NURSE PRACTITIONER

## 2022-10-10 PROCEDURE — 63600175 PHARM REV CODE 636 W HCPCS: Performed by: NURSE ANESTHETIST, CERTIFIED REGISTERED

## 2022-10-10 PROCEDURE — 25000003 PHARM REV CODE 250: Performed by: NURSE ANESTHETIST, CERTIFIED REGISTERED

## 2022-10-10 PROCEDURE — 37000008 HC ANESTHESIA 1ST 15 MINUTES: Performed by: INTERNAL MEDICINE

## 2022-10-10 PROCEDURE — 25000003 PHARM REV CODE 250: Performed by: NURSE PRACTITIONER

## 2022-10-10 PROCEDURE — S4991 NICOTINE PATCH NONLEGEND: HCPCS | Performed by: NURSE PRACTITIONER

## 2022-10-10 RX ORDER — TRAZODONE HYDROCHLORIDE 50 MG/1
50 TABLET ORAL NIGHTLY PRN
Status: DISCONTINUED | OUTPATIENT
Start: 2022-10-11 | End: 2022-10-11 | Stop reason: HOSPADM

## 2022-10-10 RX ORDER — LIDOCAINE HYDROCHLORIDE 20 MG/ML
INJECTION, SOLUTION EPIDURAL; INFILTRATION; INTRACAUDAL; PERINEURAL
Status: DISCONTINUED | OUTPATIENT
Start: 2022-10-10 | End: 2022-10-10

## 2022-10-10 RX ORDER — LIDOCAINE HYDROCHLORIDE 20 MG/ML
INJECTION, SOLUTION EPIDURAL; INFILTRATION; INTRACAUDAL; PERINEURAL
Status: COMPLETED
Start: 2022-10-10 | End: 2022-10-10

## 2022-10-10 RX ORDER — TRAZODONE HYDROCHLORIDE 50 MG/1
50 TABLET ORAL NIGHTLY
Status: DISCONTINUED | OUTPATIENT
Start: 2022-10-10 | End: 2022-10-10

## 2022-10-10 RX ORDER — ONDANSETRON 2 MG/ML
4 INJECTION INTRAMUSCULAR; INTRAVENOUS DAILY PRN
Status: CANCELLED | OUTPATIENT
Start: 2022-10-10

## 2022-10-10 RX ORDER — SUCRALFATE 1 G/1
1 TABLET ORAL
Status: DISCONTINUED | OUTPATIENT
Start: 2022-10-10 | End: 2022-10-11 | Stop reason: HOSPADM

## 2022-10-10 RX ORDER — ONDANSETRON 4 MG/1
8 TABLET, ORALLY DISINTEGRATING ORAL EVERY 6 HOURS PRN
Status: DISCONTINUED | OUTPATIENT
Start: 2022-10-10 | End: 2022-10-11 | Stop reason: HOSPADM

## 2022-10-10 RX ORDER — PANTOPRAZOLE SODIUM 40 MG/1
40 TABLET, DELAYED RELEASE ORAL
Status: DISCONTINUED | OUTPATIENT
Start: 2022-10-11 | End: 2022-10-11 | Stop reason: HOSPADM

## 2022-10-10 RX ORDER — PROPOFOL 10 MG/ML
VIAL (ML) INTRAVENOUS
Status: COMPLETED
Start: 2022-10-10 | End: 2022-10-10

## 2022-10-10 RX ORDER — SODIUM CHLORIDE, SODIUM GLUCONATE, SODIUM ACETATE, POTASSIUM CHLORIDE AND MAGNESIUM CHLORIDE 30; 37; 368; 526; 502 MG/100ML; MG/100ML; MG/100ML; MG/100ML; MG/100ML
1000 INJECTION, SOLUTION INTRAVENOUS CONTINUOUS
Status: DISCONTINUED | OUTPATIENT
Start: 2022-10-10 | End: 2022-10-11 | Stop reason: HOSPADM

## 2022-10-10 RX ORDER — IPRATROPIUM BROMIDE AND ALBUTEROL SULFATE 2.5; .5 MG/3ML; MG/3ML
3 SOLUTION RESPIRATORY (INHALATION)
Status: CANCELLED | OUTPATIENT
Start: 2022-10-10

## 2022-10-10 RX ORDER — PROPOFOL 10 MG/ML
VIAL (ML) INTRAVENOUS
Status: DISCONTINUED | OUTPATIENT
Start: 2022-10-10 | End: 2022-10-10

## 2022-10-10 RX ORDER — LIDOCAINE HYDROCHLORIDE 10 MG/ML
1 INJECTION, SOLUTION EPIDURAL; INFILTRATION; INTRACAUDAL; PERINEURAL ONCE
Status: DISCONTINUED | OUTPATIENT
Start: 2022-10-10 | End: 2022-10-11 | Stop reason: HOSPADM

## 2022-10-10 RX ORDER — PROCHLORPERAZINE EDISYLATE 5 MG/ML
5 INJECTION INTRAMUSCULAR; INTRAVENOUS EVERY 30 MIN PRN
Status: CANCELLED | OUTPATIENT
Start: 2022-10-10

## 2022-10-10 RX ORDER — MAGNESIUM SULFATE HEPTAHYDRATE 40 MG/ML
2 INJECTION, SOLUTION INTRAVENOUS ONCE
Status: COMPLETED | OUTPATIENT
Start: 2022-10-10 | End: 2022-10-10

## 2022-10-10 RX ORDER — MEPERIDINE HYDROCHLORIDE 25 MG/ML
12.5 INJECTION INTRAMUSCULAR; INTRAVENOUS; SUBCUTANEOUS EVERY 10 MIN PRN
Status: CANCELLED | OUTPATIENT
Start: 2022-10-10 | End: 2022-10-11

## 2022-10-10 RX ADMIN — ATORVASTATIN CALCIUM 40 MG: 40 TABLET, FILM COATED ORAL at 03:10

## 2022-10-10 RX ADMIN — NYSTATIN AND TRIAMCINOLONE ACETONIDE: 100000; 1 CREAM TOPICAL at 03:10

## 2022-10-10 RX ADMIN — SODIUM CHLORIDE, SODIUM GLUCONATE, SODIUM ACETATE, POTASSIUM CHLORIDE AND MAGNESIUM CHLORIDE: 526; 502; 368; 37; 30 INJECTION, SOLUTION INTRAVENOUS at 01:10

## 2022-10-10 RX ADMIN — THERA TABS 1 TABLET: TAB at 03:10

## 2022-10-10 RX ADMIN — PROPOFOL 30 MG: 10 INJECTION, EMULSION INTRAVENOUS at 01:10

## 2022-10-10 RX ADMIN — LIDOCAINE HYDROCHLORIDE 50 ML: 20 INJECTION, SOLUTION EPIDURAL; INFILTRATION; INTRACAUDAL; PERINEURAL at 01:10

## 2022-10-10 RX ADMIN — PANTOPRAZOLE SODIUM 40 MG: 40 INJECTION, POWDER, FOR SOLUTION INTRAVENOUS at 08:10

## 2022-10-10 RX ADMIN — SODIUM CHLORIDE, PRESERVATIVE FREE 10 ML: 5 INJECTION INTRAVENOUS at 02:10

## 2022-10-10 RX ADMIN — SODIUM CHLORIDE, PRESERVATIVE FREE 10 ML: 5 INJECTION INTRAVENOUS at 05:10

## 2022-10-10 RX ADMIN — NYSTATIN AND TRIAMCINOLONE ACETONIDE: 100000; 1 CREAM TOPICAL at 08:10

## 2022-10-10 RX ADMIN — SODIUM CHLORIDE, PRESERVATIVE FREE 10 ML: 5 INJECTION INTRAVENOUS at 06:10

## 2022-10-10 RX ADMIN — MAGNESIUM SULFATE HEPTAHYDRATE 2 G: 40 INJECTION, SOLUTION INTRAVENOUS at 05:10

## 2022-10-10 RX ADMIN — ACETAMINOPHEN 650 MG: 325 TABLET ORAL at 03:10

## 2022-10-10 RX ADMIN — CEFTRIAXONE SODIUM 1 G: 1 INJECTION, POWDER, FOR SOLUTION INTRAMUSCULAR; INTRAVENOUS at 02:10

## 2022-10-10 RX ADMIN — AZITHROMYCIN MONOHYDRATE 500 MG: 500 INJECTION, POWDER, LYOPHILIZED, FOR SOLUTION INTRAVENOUS at 12:10

## 2022-10-10 RX ADMIN — TRAZODONE HYDROCHLORIDE 50 MG: 50 TABLET ORAL at 08:10

## 2022-10-10 RX ADMIN — PROPOFOL 40 MG: 10 INJECTION, EMULSION INTRAVENOUS at 01:10

## 2022-10-10 RX ADMIN — NICOTINE 1 PATCH: 14 PATCH TRANSDERMAL at 08:10

## 2022-10-10 RX ADMIN — APIXABAN 5 MG: 5 TABLET, FILM COATED ORAL at 08:10

## 2022-10-10 RX ADMIN — TRAMADOL HYDROCHLORIDE 50 MG: 50 TABLET, COATED ORAL at 12:10

## 2022-10-10 RX ADMIN — SODIUM CHLORIDE, PRESERVATIVE FREE 10 ML: 5 INJECTION INTRAVENOUS at 12:10

## 2022-10-10 RX ADMIN — SODIUM CHLORIDE 125 MG: 9 INJECTION, SOLUTION INTRAVENOUS at 08:10

## 2022-10-10 RX ADMIN — SUCRALFATE 1 G: 1 TABLET ORAL at 08:10

## 2022-10-10 NOTE — ANESTHESIA PREPROCEDURE EVALUATION
10/10/2022  Nimo Dill is a 66 y.o., female admitted October 7th after multiple syncopal episodes with melena and further workup revealed sepsis with non ST elevation MI (dmenad)and moderate anemia that worsens and eventually requires transfusion.  sHe presents today for EGD.    Last 3 sets of Vitals    Vitals - 1 value per visit 10/10/2022 10/10/2022 10/10/2022   SYSTOLIC - 133 -   DIASTOLIC - 76 -   Pulse 78 87 -   Temp - 97.8 -   Resp - 16 20   SPO2 - 95 -   Weight (lb) - - -   Weight (kg) - - -   Height - - -   BMI (Calculated) - - -   VISIT REPORT - - -   Pain Score  - - -       Recent Results (from the past 336 hour(s))   CBC with Differential    Collection Time: 10/10/22  2:34 AM   Result Value Ref Range    WBC 13.2 (H) 4.5 - 11.5 x10(3)/mcL    Hgb 10.6 (L) 12.0 - 16.0 gm/dL    Hct 34.4 (L) 37.0 - 47.0 %    Platelet 226 130 - 400 x10(3)/mcL   CBC with Differential    Collection Time: 10/09/22  8:39 AM   Result Value Ref Range    WBC 14.3 (H) 4.5 - 11.5 x10(3)/mcL    Hgb 11.2 (L) 12.0 - 16.0 gm/dL    Hct 35.0 (L) 37.0 - 47.0 %    Platelet 214 130 - 400 x10(3)/mcL   CBC with Differential    Collection Time: 10/08/22  9:03 AM   Result Value Ref Range    WBC 14.6 (H) 4.5 - 11.5 x10(3)/mcL    Hgb 7.7 (L) 12.0 - 16.0 gm/dL    Hct 26.3 (L) 37.0 - 47.0 %    Platelet 193 130 - 400 x10(3)/mcL         BMP  Lab Results   Component Value Date     10/10/2022    K 3.8 10/10/2022     09/24/2013     09/24/2013    CO2 23 10/10/2022    BUN 11.1 10/10/2022    CREATININE 0.96 10/10/2022    CALCIUM 7.8 (L) 10/10/2022    ANIONGAP 11 09/24/2013    ANIONGAP 11 09/24/2013    ESTGFRAFRICA >60.0 09/24/2013    ESTGFRAFRICA >60.0 09/24/2013    EGFRNONAA >60 06/15/2022        CMP  Sodium   Date Value Ref Range Status   09/24/2013 141 136 - 145 mmol/L Final   09/24/2013 141 136 - 145 mmol/L Final     Sodium  Level   Date Value Ref Range Status   10/10/2022 138 136 - 145 mmol/L Final     Potassium   Date Value Ref Range Status   09/24/2013 3.7 3.5 - 5.1 mmol/L Final   09/24/2013 3.7 3.5 - 5.1 mmol/L Final     Potassium Level   Date Value Ref Range Status   10/10/2022 3.8 3.5 - 5.1 mmol/L Final     Chloride   Date Value Ref Range Status   09/24/2013 105 95 - 110 mmol/L Final   09/24/2013 105 95 - 110 mmol/L Final     CO2   Date Value Ref Range Status   09/24/2013 25 23 - 29 mmol/L Final   09/24/2013 25 23 - 29 mmol/L Final     Carbon Dioxide   Date Value Ref Range Status   10/10/2022 23 23 - 31 mmol/L Final     Glucose   Date Value Ref Range Status   09/24/2013 83 70 - 110 mg/dL Final   09/24/2013 83 70 - 110 mg/dL Final     BUN   Date Value Ref Range Status   09/24/2013 17 6 - 20 mg/dL Final   09/24/2013 17 6 - 20 mg/dL Final     Blood Urea Nitrogen   Date Value Ref Range Status   10/10/2022 11.1 9.8 - 20.1 mg/dL Final     Creatinine   Date Value Ref Range Status   10/10/2022 0.96 0.55 - 1.02 mg/dL Final   09/24/2013 0.7 0.5 - 1.4 mg/dL Final   09/24/2013 0.7 0.5 - 1.4 mg/dL Final     Calcium   Date Value Ref Range Status   09/24/2013 9.9 8.7 - 10.5 mg/dL Final   09/24/2013 9.9 8.7 - 10.5 mg/dL Final     Calcium Level Total   Date Value Ref Range Status   10/10/2022 7.8 (L) 8.4 - 10.2 mg/dL Final     Total Protein   Date Value Ref Range Status   09/24/2013 7.4 6.0 - 8.4 g/dL Final   09/24/2013 7.4 6.0 - 8.4 g/dL Final     Albumin   Date Value Ref Range Status   09/24/2013 3.8 3.5 - 5.2 g/dL Final   09/24/2013 3.8 3.5 - 5.2 g/dL Final     Albumin Level   Date Value Ref Range Status   10/08/2022 1.6 (L) 3.4 - 4.8 gm/dL Final     Total Bilirubin   Date Value Ref Range Status   09/24/2013 0.6 0.1 - 1.0 mg/dL Final     Comment:     For infants and newborns, interpretation of results should be based  on gestational age, weight and in agreement with clinical  observations.  Premature Infant recommended reference ranges:  Up to  24 hours.............<8.0 mg/dL  Up to 48 hours............<12.0 mg/dL  3-5 days..................<15.0 mg/dL  6-29 days.................<15.0 mg/dL   09/24/2013 0.6 0.1 - 1.0 mg/dL Final     Comment:     For infants and newborns, interpretation of results should be based  on gestational age, weight and in agreement with clinical  observations.  Premature Infant recommended reference ranges:  Up to 24 hours.............<8.0 mg/dL  Up to 48 hours............<12.0 mg/dL  3-5 days..................<15.0 mg/dL  6-29 days.................<15.0 mg/dL     Bilirubin Total   Date Value Ref Range Status   10/08/2022 0.6 <=1.5 mg/dL Final     Alkaline Phosphatase   Date Value Ref Range Status   10/08/2022 150 40 - 150 unit/L Final   09/24/2013 82 55 - 135 U/L Final   09/24/2013 82 55 - 135 U/L Final     AST   Date Value Ref Range Status   09/24/2013 16 10 - 40 U/L Final   09/24/2013 16 10 - 40 U/L Final     Aspartate Aminotransferase   Date Value Ref Range Status   10/08/2022 16 5 - 34 unit/L Final     ALT   Date Value Ref Range Status   09/24/2013 12 10 - 44 U/L Final   09/24/2013 12 10 - 44 U/L Final     Alanine Aminotransferase   Date Value Ref Range Status   10/08/2022 19 0 - 55 unit/L Final     Anion Gap   Date Value Ref Range Status   09/24/2013 11 8 - 16 mmol/L Final   09/24/2013 11 8 - 16 mmol/L Final     eGFR if    Date Value Ref Range Status   09/24/2013 >60.0 >60 mL/min/1.73 m^2 Final   09/24/2013 >60.0 >60 mL/min/1.73 m^2 Final     eGFR if non    Date Value Ref Range Status   09/24/2013 >60.0 >60 mL/min/1.73 m^2 Final     Comment:     Calculation used to obtain the estimated glomerular filtration  rate (eGFR) is the CKD-EPI equation. Since race is unknown   in our information system, the eGFR values for   -American and Non--American patients are given   for each creatinine result.   09/24/2013 >60.0 >60 mL/min/1.73 m^2 Final     Comment:     Calculation used to obtain  the estimated glomerular filtration  rate (eGFR) is the CKD-EPI equation. Since race is unknown   in our information system, the eGFR values for   -American and Non--American patients are given   for each creatinine result.     Estimated GFR-Non    Date Value Ref Range Status   06/15/2022 >60 mls/min/1.73/m2 Final        Lab Results   Component Value Date    AMYLASE 54 04/01/2021    AMYLASE 50 09/24/2013       Lab Results   Component Value Date    LIPASE 11 04/01/2021        Lab Results   Component Value Date    HGBA1C 5.9 10/07/2022       Recent Labs   Lab 10/07/22  1226   TROPONINI 0.361*        Lab Results   Component Value Date    INR 0.92 09/14/2021    INR 1.0 09/04/2020    PROTIME 12.1 09/14/2021    PROTIME 12.6 09/04/2020   Results for orders placed during the hospital encounter of 10/06/22    Echo    Interpretation Summary  · The estimated ejection fraction is 55-60%.  · Normal systolic function.  · Normal left ventricular diastolic function.  · Moderate mitral regurgitation.  · Mild tricuspid regurgitation.  · Normal central venous pressure (3 mmHg).  · The estimated PA systolic pressure is 35 mmHg.     Pre-op Assessment    I have reviewed the Patient Summary Reports.    I have reviewed the NPO Status.   I have reviewed the Medications.     Review of Systems  Anesthesia Hx:   Denies Personal Hx of Anesthesia complications.   Social:  Smoker    Cardiovascular:  Functional Capacity good / => 4 METS    Hepatic/GI:   PUD,    Neurological:   CVA        Physical Exam  General: Well nourished, Cooperative, Alert and Oriented    Airway:  Mallampati: II   Mouth Opening: Normal  TM Distance: Normal  Tongue: Normal  Neck ROM: Normal ROM    Dental:  Intact    Chest/Lungs:  Clear to auscultation, Normal Respiratory Rate    Heart:  Rate: Normal  Rhythm: Regular Rhythm        Anesthesia Plan  Type of Anesthesia, risks & benefits discussed:    Anesthesia Type: Gen Natural Airway  Intra-op  Monitoring Plan: Standard ASA Monitors  Induction:  IV  Informed Consent: Informed consent signed with the Patient and all parties understand the risks and agree with anesthesia plan.  All questions answered.   ASA Score: 4  Day of Surgery Review of History & Physical: H&P Update referred to the surgeon/provider.    Ready For Surgery From Anesthesia Perspective.     .

## 2022-10-10 NOTE — PROCEDURES
Nimo Dill   MRN: [id number]   ADMISSION DATE: 10/6/2022  : 1955  AGE: 66 y.o.    DATE OF PROCEDURE:  10/10/2022     PROCEDURE:  EGD, diagnostic    SURGEON: CARLY MARIE    PREOPERATIVE DIAGNOSIS:  Anemia, history of gastric perforation in 2018 status post partial gastrectomy in the past, history of unintentional weight loss    POSTOPERATIVE DIAGNOSIS:  1.  Diffuse gastritis, distal stomach with evidence of previous gastric surgery.  Photodocumentation obtained.  2. Probable gastroparesis related to underlying gastric surgery.  Otherwise normal exam      HISTORY AND PHYSICAL:  As per preoperative note.      DESCRIPTION OF PROCEDURE:  Informed consent was obtained.  Patient was placed in left lateral position.  Sedation per the anesthesia service.  The Olympus video gastroscope was introduced into the oral cavity and esophagus was intubated under direct visualization. The scope was carefully advanced to the distal duodenum.  Patient tolerated the procedure well without any complications.    FINDINGS:  Esophagus:  Normal mucosa.  There was no significant abnormality noted.    Stomach:  Fundus and cardia appeared unremarkable.  There was evidence of the inflammation noted in the distal stomach around the anastomotic site with previous sutures etc. in place.  Photodocumentation was obtained.  No definite ulcerations were noted.  There was mucosal friability.  No significant findings noted around the anastomotic site.  Moderate amount of food residue in the stomach suggestive of gastroparesis.   Small bowel: Small bowel mucosa appeared unremarkable to the extent of exam.    ESTIMATED BLOOD LOSS:  4-5 cc    COMPLICATIONS:  None.    RECOMMENDATIONS:  1. Diet as tolerated.    2.  Obtain previous colonoscopy report.  3.  PPI along with Carafate.    4.  Monitor hemoglobin/hematocrit periodically

## 2022-10-10 NOTE — TRANSFER OF CARE
"Anesthesia Transfer of Care Note    Patient: Nimo Dill    Procedure(s) Performed: Procedure(s) (LRB):  EGD (N/A)    Patient location: PACU    Anesthesia Type: general    Transport from OR: Transported from OR on room air with adequate spontaneous ventilation    Post pain: adequate analgesia    Post assessment: no apparent anesthetic complications and tolerated procedure well    Post vital signs: stable    Level of consciousness: responds to stimulation    Nausea/Vomiting: no nausea/vomiting    Complications: none    Transfer of care protocol was followed      Last vitals:   Visit Vitals  BP (!) 95/53   Pulse 78   Temp 36.4 °C (97.5 °F)   Resp 15   Ht 5' 4" (1.626 m)   Wt 44.6 kg (98 lb 5.2 oz)   SpO2 97%   Breastfeeding No   BMI 16.88 kg/m²     "

## 2022-10-10 NOTE — PT/OT/SLP EVAL
"Speech Language Pathology Department  Cognitive-Communication Evaluation    Patient Name:  Nimo Dill   MRN:  [id number]  Admitting Diagnosis: CVA protocol    Recommendations:     General recommendations:  SLP intervention not indicated  Communication strategies:  none    Discharge recommendations:  Discharge Facility/Level of Care Needs: rehabilitation facility   Barriers to safe discharge: past medical history    History:     Past Medical History:   Diagnosis Date    Common bile duct dilatation     Epigastric abdominal pain     Gastric ulcer, acute with hemorrhage and perforation     PUD (peptic ulcer disease)        Past Surgical History:   Procedure Laterality Date    APPENDECTOMY       SECTION, CLASSIC      CHOLECYSTECTOMY      Distal gastrectomy      GASTRECTOMY      HYSTERECTOMY      Rectovaginal fistula repair      TONSILLECTOMY      VAGOTOMY AND PYLOROPLASTY         Previous level of Function  Occupation: retired  Lives: with spouse  Glasses: yes readers  Hearing Aids: no  Home Responsibilities: home and financial management      Subjective     Patient awake, alert, and oriented x4 .    Patient goals: "Ready for my procedure (EGD)"     Pain/Comfort:  Pain Rating 1: 0/10      Objective:     SPEECH PRODUCTION  Phoneme Production: WFL  Voice Quality: WFL  Voice Production: WFL  Speech Rate: WFL  Loudness: WFL  Respiration: WFL  Resonance: WFL  Prosody: WFL  Speech Intelligibility  Known Context: Greater that 90%  Unknown Context: Greater that 90%    AUDITORY COMPREHENSION  Following Directions:  1-Step: 100  2-Step: 100  Yes/No Questions:  Biographical: 100  Environmental: 100  Simple: 100  Complex: 100    VERBAL EXPRESSION  Automatic Speech:  Days of the week: 100  Countin  Repetition:  Multi-syllabic words: WFL    Confrontation Naming  Objects: 100  Wh- Questions:  Object name: 100  Object function: 100    COGNITION  Orientation:  x4  Attention:  Focused: WFL  Sustained: WFL  Pragmatics:  Eye " contact: [facility]  Personal space: [facility]  Facial expression: WFL  Communicative Intent: WFL  Memory:  Immediate: WFL  Short Term: 7/12 on Four Word Memory Task  Long Term: WFL  Problem Solving  Functional simple: WFL  Functional complex: WFL  Money Management: WFL  Organization:  Convergent thinking: WFL  Divergent thinking: WFL  Executive Function:  Attention to detail: WFL  Awareness: WFL  Cognitive endurance: WFL  Information processing: WFL    Assessment:     The pt presents with speech, language, and cognitive abilities WF.  Skilled SLP services no longer warranted, please reconsult as needed.    Pt NPO for EGD today however nursing reports no difficulty regarding diet tolerance prior to NPO status.    Goals:   Multidisciplinary Problems       SLP Goals          Problem: SLP    Goal Priority Disciplines Outcome   SLP Goal     SLP Ongoing, Progressing   Description: Long Term Goal:  Pt will tolerate least restrictive diet with no clinical signs/sx of aspiration       Short Term Goals:  1. Pt will complete MBS to assess swallow function/safety for least restrictive diet  GOAL MET 10/8/2022          POC initiated and goals created  Bessie Alfaro CCC SLP                        Plan:     Patient to be seen:  daily   Plan of Care expires:  11/08/22  Plan of Care reviewed with:  patient, spouse   SLP Follow-Up:  No       Time Tracking:     SLP Treatment Date:   10/10/22  Speech Start Time:  1140  Speech Stop Time:  1200     Speech Total Time (min):  20 min    Billable minutes:  Evaluation of Speech Sound Production with Comprehension and Expression, 20 min        10/10/2022

## 2022-10-10 NOTE — PT/OT/SLP PROGRESS
Physical Therapy Treatment    Patient Name:  Nimo Dill   MRN:  [id number]    Recommendations:     Discharge Recommendations:  rehabilitation facility   Discharge Equipment Recommendations: walker, rolling   Barriers to discharge:       Assessment:     Nimo Dill is a 66 y.o. female admitted with a medical diagnosis of nonhemorrhagic lacunar infarct in bilateral cerebellar hemisphere, leukocytosis, anemia.    She presents with the following impairments/functional limitations:  weakness, impaired balance, impaired self care skills, impaired endurance, gait instability.    Rehab Prognosis: Good; patient would benefit from acute skilled PT services to address these deficits and reach maximum level of function.    Recent Surgery: Procedure(s) (LRB):  EGD (N/A) Day of Surgery    Plan:     During this hospitalization, patient to be seen 6 x/week to address the identified rehab impairments via gait training, therapeutic activities, therapeutic exercises and progress toward the following goals:    Plan of Care Expires:  11/07/22    Subjective     Chief Complaint: agreeable to therapy, no new  complaints   Patient/Family Comments/goals:   Pain/Comfort:  Pain Rating 1: 0/10      Objective:     Communicated with NSG prior to session.  Patient found up in chair on chair alarm and connected to telemetry, peripheral IV upon PTA entry to room.     General Precautions: Standard, aspiration   Orthopedic Precautions:N/A   Braces: N/A  Respiratory Status: Room air     Functional Mobility:  T/F: with RW, Renzo   Gait: ModA to Renzo for balance as pt ambulated for approximately 80 ft using RW for support. Pt presents with step through gait pattern, NBOS, very slow pace, and tends to look down at feet while ambulating, Vcs throughout to correct.       Patient left up in chair with all lines intact and call button in reach..    GOALS:   Multidisciplinary Problems       Physical Therapy Goals          Problem: Physical Therapy     Goal Priority Disciplines Outcome Goal Variances Interventions   Physical Therapy Goal     PT, PT/OT Ongoing, Progressing     Description: Goals to be met by: 2022    Patient will increase functional independence with mobility by performin. Supine to sit with Contact Guard Assistance  2. Sit to stand transfer with Contact Guard Assistance  3. Gait  x 200 feet with Contact Guard Assistance using Rolling Walker.                          Time Tracking:     PT Received On:    PT Start Time: 918     PT Stop Time: 941  PT Total Time (min): 23 min     Billable Minutes: Gait Training 23    Treatment Type: Treatment  PT/PTA: PTA     PTA Visit Number: 1     10/10/2022

## 2022-10-10 NOTE — ANESTHESIA POSTPROCEDURE EVALUATION
Anesthesia Post Evaluation    Patient: Nimo Dill    Procedure(s) Performed: Procedure(s) (LRB):  EGD (N/A)    Final Anesthesia Type: general      Patient location during evaluation: floor  Patient participation: Yes- Able to Participate  Level of consciousness: awake and alert  Post-procedure vital signs: reviewed and stable  Pain management: adequate  Airway patency: patent    PONV status at discharge: No PONV  Anesthetic complications: no      Cardiovascular status: blood pressure returned to baseline  Respiratory status: spontaneous ventilation  Hydration status: euvolemic  Follow-up not needed.          Vitals Value Taken Time   /74 10/10/22 1428   Temp  10/10/22 1430   Pulse 86 10/10/22 1428   Resp 23 10/10/22 1428   SpO2 95 % 10/10/22 1428         Event Time   Out of Recovery 10/10/2022 14:05:47         Pain/Mckenna Score: Pain Rating Prior to Med Admin: 6 (10/10/2022 12:03 PM)  Pain Rating Post Med Admin: 0 (10/10/2022  4:57 AM)  Mckenna Score: 10 (10/10/2022  2:28 PM)

## 2022-10-10 NOTE — PT/OT/SLP PROGRESS
Occupational Therapy      Patient Name:  Nimo Dill   MRN:  [id number]    Patient not seen today secondary to Off the floor for procedure/surgery. Will follow-up as able.    10/10/2022

## 2022-10-11 VITALS
HEART RATE: 93 BPM | BODY MASS INDEX: 16.78 KG/M2 | SYSTOLIC BLOOD PRESSURE: 165 MMHG | RESPIRATION RATE: 18 BRPM | HEIGHT: 64 IN | DIASTOLIC BLOOD PRESSURE: 93 MMHG | TEMPERATURE: 99 F | OXYGEN SATURATION: 96 % | WEIGHT: 98.31 LBS

## 2022-10-11 PROBLEM — D50.8 IRON DEFICIENCY ANEMIA AFTER GASTRECTOMY: Chronic | Status: ACTIVE | Noted: 2022-10-11

## 2022-10-11 PROBLEM — R53.1 WEAKNESS: Status: ACTIVE | Noted: 2022-10-11

## 2022-10-11 PROBLEM — R79.89 ELEVATED TROPONIN: Status: ACTIVE | Noted: 2022-10-11

## 2022-10-11 PROBLEM — J18.9 CAP (COMMUNITY ACQUIRED PNEUMONIA): Status: ACTIVE | Noted: 2022-10-11

## 2022-10-11 PROBLEM — K91.89 IRON DEFICIENCY ANEMIA AFTER GASTRECTOMY: Chronic | Status: ACTIVE | Noted: 2022-10-11

## 2022-10-11 PROBLEM — J18.9 CAP (COMMUNITY ACQUIRED PNEUMONIA): Status: RESOLVED | Noted: 2022-10-11 | Resolved: 2022-10-11

## 2022-10-11 PROBLEM — E46 PROTEIN CALORIE MALNUTRITION: Chronic | Status: ACTIVE | Noted: 2022-10-11

## 2022-10-11 PROBLEM — I10 HVD (HYPERTENSIVE VASCULAR DISEASE): Status: ACTIVE | Noted: 2022-10-11

## 2022-10-11 PROBLEM — N39.0 UTI (URINARY TRACT INFECTION): Status: RESOLVED | Noted: 2022-10-11 | Resolved: 2022-10-11

## 2022-10-11 PROBLEM — R29.6 FALLS FREQUENTLY: Chronic | Status: ACTIVE | Noted: 2022-10-11

## 2022-10-11 PROBLEM — N39.0 UTI (URINARY TRACT INFECTION): Status: ACTIVE | Noted: 2022-10-11

## 2022-10-11 LAB
BACTERIA BLD CULT: NORMAL
BACTERIA BLD CULT: NORMAL

## 2022-10-11 PROCEDURE — 25000003 PHARM REV CODE 250: Performed by: INTERNAL MEDICINE

## 2022-10-11 PROCEDURE — S4991 NICOTINE PATCH NONLEGEND: HCPCS | Performed by: NURSE PRACTITIONER

## 2022-10-11 PROCEDURE — 25000003 PHARM REV CODE 250: Performed by: NURSE PRACTITIONER

## 2022-10-11 PROCEDURE — 63600175 PHARM REV CODE 636 W HCPCS: Performed by: INTERNAL MEDICINE

## 2022-10-11 PROCEDURE — 97116 GAIT TRAINING THERAPY: CPT | Mod: CQ

## 2022-10-11 PROCEDURE — A4216 STERILE WATER/SALINE, 10 ML: HCPCS | Performed by: INTERNAL MEDICINE

## 2022-10-11 PROCEDURE — 97110 THERAPEUTIC EXERCISES: CPT | Mod: CQ

## 2022-10-11 PROCEDURE — 63600175 PHARM REV CODE 636 W HCPCS: Performed by: NURSE PRACTITIONER

## 2022-10-11 RX ORDER — SUCRALFATE 1 G/1
1 TABLET ORAL 4 TIMES DAILY
Qty: 120 TABLET | Refills: 0 | Status: SHIPPED | OUTPATIENT
Start: 2022-10-11 | End: 2022-11-10

## 2022-10-11 RX ORDER — METOPROLOL SUCCINATE 50 MG/1
50 TABLET, EXTENDED RELEASE ORAL DAILY
Qty: 30 TABLET | Refills: 11 | Status: SHIPPED | OUTPATIENT
Start: 2022-10-11 | End: 2023-10-11

## 2022-10-11 RX ORDER — ATORVASTATIN CALCIUM 40 MG/1
40 TABLET, FILM COATED ORAL DAILY
Qty: 90 TABLET | Refills: 3 | Status: SHIPPED | OUTPATIENT
Start: 2022-10-12 | End: 2023-10-12

## 2022-10-11 RX ORDER — FERROUS SULFATE 325(65) MG
325 TABLET, DELAYED RELEASE (ENTERIC COATED) ORAL DAILY
Qty: 30 TABLET | Refills: 3 | Status: SHIPPED | OUTPATIENT
Start: 2022-10-11 | End: 2022-11-10

## 2022-10-11 RX ORDER — METOPROLOL SUCCINATE 50 MG/1
50 TABLET, EXTENDED RELEASE ORAL DAILY
Status: DISCONTINUED | OUTPATIENT
Start: 2022-10-11 | End: 2022-10-11 | Stop reason: HOSPADM

## 2022-10-11 RX ORDER — NAPROXEN SODIUM 220 MG/1
81 TABLET, FILM COATED ORAL DAILY
Qty: 30 TABLET | Refills: 3 | Status: ON HOLD | OUTPATIENT
Start: 2022-10-11 | End: 2022-10-28 | Stop reason: HOSPADM

## 2022-10-11 RX ORDER — LEVOFLOXACIN 500 MG/1
500 TABLET, FILM COATED ORAL DAILY
Qty: 4 TABLET | Refills: 0 | Status: SHIPPED | OUTPATIENT
Start: 2022-10-11 | End: 2022-10-15

## 2022-10-11 RX ORDER — PANTOPRAZOLE SODIUM 40 MG/1
40 TABLET, DELAYED RELEASE ORAL 2 TIMES DAILY
Qty: 60 TABLET | Refills: 3 | Status: SHIPPED | OUTPATIENT
Start: 2022-10-11 | End: 2023-02-08

## 2022-10-11 RX ADMIN — ATORVASTATIN CALCIUM 40 MG: 40 TABLET, FILM COATED ORAL at 08:10

## 2022-10-11 RX ADMIN — THERA TABS 1 TABLET: TAB at 08:10

## 2022-10-11 RX ADMIN — SUCRALFATE 1 G: 1 TABLET ORAL at 05:10

## 2022-10-11 RX ADMIN — SODIUM CHLORIDE, PRESERVATIVE FREE 10 ML: 5 INJECTION INTRAVENOUS at 06:10

## 2022-10-11 RX ADMIN — TRAMADOL HYDROCHLORIDE 50 MG: 50 TABLET, COATED ORAL at 08:10

## 2022-10-11 RX ADMIN — NICOTINE 1 PATCH: 14 PATCH TRANSDERMAL at 08:10

## 2022-10-11 RX ADMIN — SODIUM CHLORIDE, PRESERVATIVE FREE 10 ML: 5 INJECTION INTRAVENOUS at 12:10

## 2022-10-11 RX ADMIN — SODIUM CHLORIDE 125 MG: 9 INJECTION, SOLUTION INTRAVENOUS at 08:10

## 2022-10-11 RX ADMIN — SUCRALFATE 1 G: 1 TABLET ORAL at 02:10

## 2022-10-11 RX ADMIN — TRAMADOL HYDROCHLORIDE 50 MG: 50 TABLET, COATED ORAL at 12:10

## 2022-10-11 RX ADMIN — NYSTATIN AND TRIAMCINOLONE ACETONIDE: 100000; 1 CREAM TOPICAL at 08:10

## 2022-10-11 RX ADMIN — PANTOPRAZOLE SODIUM 40 MG: 40 TABLET, DELAYED RELEASE ORAL at 05:10

## 2022-10-11 RX ADMIN — CEFTRIAXONE SODIUM 1 G: 1 INJECTION, POWDER, FOR SOLUTION INTRAMUSCULAR; INTRAVENOUS at 03:10

## 2022-10-11 RX ADMIN — APIXABAN 5 MG: 5 TABLET, FILM COATED ORAL at 08:10

## 2022-10-11 RX ADMIN — SUCRALFATE 1 G: 1 TABLET ORAL at 11:10

## 2022-10-11 RX ADMIN — METOPROLOL SUCCINATE 50 MG: 50 TABLET, EXTENDED RELEASE ORAL at 02:10

## 2022-10-11 RX ADMIN — NYSTATIN AND TRIAMCINOLONE ACETONIDE: 100000; 1 CREAM TOPICAL at 02:10

## 2022-10-11 RX ADMIN — PANTOPRAZOLE SODIUM 40 MG: 40 TABLET, DELAYED RELEASE ORAL at 02:10

## 2022-10-11 NOTE — PROGRESS NOTES
"[facility] Hospital Medicine Progress Note        Chief Complaint: Inpatient Follow-up for Dizziness (Pt to ER via POV for dizziness.  Started 2 weeks ago.  Was seen in [place] and had MRI.  States also weak and decreased appetite.  Pt states off balance and falling a lot.  Pt states missed the first appt and has not called them back)    HPI: Mrs. Dill is a 67 yo female with pmhx tobacco use, PUD/gastric perforation s/p gastrectomy in 2018, recurrent falls ongoing for the past year and 60 lbs unintentional weight loss. She is not a good historian. She underwent an MRI Brain w/o on 9/13/22 due to recurrent falls with results showing old cerebellar infarcts and an old right centrum semi oval infarct with chronic age related volume loss and chronic microvascular disease. She states "my  couldn't take it anymore so he sent me here". She states she has been falling more frequently over the past 2 weeks, also reports subjective fever, cough, dysuria, and intermittent episodes of chest pain however there is none present today.  She has urinary incontinence and wears a diaper.          Initial ED VS: Temperature 97.9°, heart rate 103, respirations 20, /71, oxygenation 97% room air.  CT head negative for acute intracranial finding.  Labs remarkable for leukocytosis of 18,600 with a left shift, hemoglobin 9.7, hematocrit 32.5, troponin 0.347 . EKG BRENNA with non-specific ST changes, right atrial enlargement..  UA positive for UTI., BUN 31.5. Pt is being admitted to . [facility] consulted, will follow.          Patient seen and examined this evening, she is a poor historian.  She denies history of CAD/AMI.  She reports dark tarry stools but unable to tell me for how long. No NSAID use, hx of PUD with gastric ulcer perf in th past.  She reports multiple recurrent falls over the past year however they have worsened over the past few weeks and does state that at times she loses " consciousness with her falls She has never had routine colonoscopy screening.     Interval Hx:   10-8-22 Hematocrit at 24.5 w fluids 10/7/22 and 26.3 wo fluids today 10-8-21. Pt will receive 2 units prbc. Then , had a fall in room while attempting to get out of bed. Skin breakdowns to LUE, no loc    10-9-22- cleared by neuro for egd tomorrow. Case discussed with pt and . Pt refers nsaid abuse until around 2 weeks ago on a daily basis    10/10/22 feels great/ tolerated PT w assistance /post egd(see below)... some ulceration at site of gastrectomy anastomosis but no active bleeding    Objective/physical exam:  General:  Cachectic female in no acute distress,    HEENT: NC/AT, edentulous, tongue red with white bumps in the back.    Neck:  No JVD    Chest: CTABL    CVS: Regular rhythm. Normal S1/S2.    Abdomen: nondistended, normoactive BS, soft and non-tender.    MSK: No obvious deformity or joint swelling diffuse muscle atrophy    Skin: multiple bruises to BUE, new skin breakdowns to RUE from fall    Neuro: AAOx3, speech slow but appropriate,  motor strength 5/5 BLE and BUE, Finger to nose test WNL,ataxic.     Psych: Cooperative    VITAL SIGNS: 24 HRS MIN & MAX LAST   Temp  Min: 97.5 °F (36.4 °C)  Max: 99.7 °F (37.6 °C) 99.7 °F (37.6 °C)   BP  Min: 95/53  Max: 163/96 123/78   Pulse  Min: 78  Max: 101  101   Resp  Min: 15  Max: 30 18   SpO2  Min: 90 %  Max: 97 % (!) 90 %         Recent Labs   Lab 10/08/22  0903 10/09/22  0839 10/10/22  0234   WBC 14.6* 14.3* 13.2*   RBC 3.13* 4.19* 4.08*   HGB 7.7* 11.2* 10.6*   HCT 26.3* 35.0* 34.4*   MCV 84.0 83.5 84.3   MCH 24.6* 26.7* 26.0*   MCHC 29.3* 32.0* 30.8*   RDW 17.8* 16.5 16.9    214 226   MPV 11.7* 11.6* 11.6*         Recent Labs   Lab 10/06/22  1841 10/07/22  0715 10/08/22  0418 10/09/22  0605 10/10/22  0234      < > 137 138 138   K 4.0   < > 3.9 3.7 3.8   CO2 21*   < > 19* 25 23   BUN 31.5*   < > 11.6 9.9 11.1   CREATININE 0.74   < > 0.66 0.66 0.96    CALCIUM 8.5   < > 7.9* 8.0* 7.8*   MG 1.70   < > 1.30* 1.30* 1.80   ALBUMIN 2.0*  --  1.6*  --   --    ALKPHOS 195*  --  150  --   --    ALT 26  --  19  --   --    AST 20  --  16  --   --    BILITOT 0.8  --  0.6  --   --     < > = values in this interval not displayed.            Microbiology Results (last 7 days)       Procedure Component Value Units Date/Time    Blood culture #1 **CANNOT BE ORDERED STAT** [[id number]]  (Normal) Collected: 10/06/22 2129    Order Status: Completed Specimen: Blood Updated: 10/09/22 2201     CULTURE, BLOOD ([facility]) No Growth At 72 Hours    Blood culture #2 **CANNOT BE ORDERED STAT** [[id number]]  (Normal) Collected: 10/06/22 2129    Order Status: Completed Specimen: Blood Updated: 10/09/22 2201     CULTURE, BLOOD ([facility]) No Growth At 72 Hours    Urine culture [[id number]]  (Abnormal)  (Susceptibility) Collected: 10/06/22 2146    Order Status: Completed Specimen: Urine Updated: 10/09/22 1023     Urine Culture >/= 100,000 colonies/ml Escherichia coli             See below for Radiology    Scheduled Med:   atorvastatin  40 mg Oral Daily    cefTRIAXone (ROCEPHIN) IVPB  1 g Intravenous Q24H    ferric gluconate (FERRLECIT) IVPB  125 mg Intravenous Daily    HYDROcodone-acetaminophen  1 tablet Oral Once    LIDOcaine (PF) 10 mg/ml (1%)  1 mL Intradermal Once    multivitamin  1 tablet Oral Daily    nicotine  1 patch Transdermal Daily    nystatin-triamcinolone   Topical (Top) TID    pantoprazole  40 mg Intravenous BID    sodium chloride 0.9%  10 mL Intravenous Q6H        Continuous Infusions:   electrolyte-A         Egd Findings:        No gross lesions were noted in the entire esophagus. A previous        surgical anastomosis was found in the stomach. This was        characterized by ulceration without bleeding A large amount of food        (residue) was found in the entire examined stomach. The examined        jejunum was normal.   Impression:           - No gross lesions in esophagus.                          - A previous surgical anastomosis was found.                         - A large amount of food (residue) in the stomach.                         - Normal examined jejunum.   Recommendation:       - Discharge patient to home (ambulatory).                         - PPI/sucralfate/H2 blocker therapy for ulceration                         - Repeat the upper endoscopy to check healing.        PRN Meds:  sodium chloride, sodium chloride, sodium chloride, acetaminophen, acetaminophen, albuterol-ipratropium, aluminum-magnesium hydroxide-simethicone, melatonin, ondansetron, ondansetron, polyethylene glycol, prochlorperazine, senna-docusate 8.6-50 mg, simethicone, sodium chloride 0.9%, Flushing PICC Protocol **AND** sodium chloride 0.9% **AND** sodium chloride 0.9%, traMADoL, zinc oxide-cod liver oil       Assessment/Plan:    Sepsis      CAP- Right lung  -rocephin/azithromycin      UTI (POA)  -gram negative rods  -Rocephin day 3        NSTEMI      Recurrent Syncope/Falls/Gait Ataxia, chronic w findings of chronic cerebellar infarct        several acute nonhemorrhagic lacunar infarcts which involve bilateral cerebellar hemispheres, right aspect of the agustín, left occipital lobe, right basal ganglia and bilateral frontoparietal lobes.   -old lacunar infarcts which involve bilateral cerebellar hemispheres and the right corona radiata  -continue tele/could benefit from linq monitor  -will need DOAC...eventually  -ataxia      Hypochromic  microcytic Anemia/Melena  -low iron w low iron sat  -will start ferlecit 125 mgs iv x 5 days.        Acute symptomatic anemia  -  transfused 2 units prbc      Severe PCM with 60 # weight loss  -passed mbs, start clear liquid diet      Tobacco Use Disorder      Muscular and physical deconditioning      Suspect ugib/pud secondary to nsaids  - continue ppi iv  -cleared for egd  -Hx of gastric ulcers/ gastrectomy  - ulcerations w/o bleeding at gastrectomy site.    Plan- ferrlicit 125 mgs iv  daily x 5 days .start eliquis 5 mgs po bid and monitor. GI to attempt to get latest colonoscopy report from [facility]. Continue  PT.  Ppi/h2/no nsaids/ will need placement              VTE prophylaxis:     Patient condition: Guarded    Anticipated discharge and Disposition:         All diagnosis and differential diagnosis have been reviewed; assessment and plan has been documented; I have personally reviewed the labs and test results that are presently available; I have reviewed the patients medication list; I have reviewed the consulting providers response and recommendations. I have reviewed or attempted to review medical records based upon their availability    All of the patient's questions have been  addressed and answered. Patient's is agreeable to the above stated plan. I will continue to monitor closely and make adjustments to medical management as needed.  _____________________________________________________________________    Nutrition Status:    Radiology:  CTA Head and Neck (xpd)  Narrative: EXAMINATION:  CTA HEAD AND NECK (XPD)    CLINICAL HISTORY:  Stroke/TIA, determine embolic source;    TECHNIQUE:  Non contrast low dose axial images were obtained through the head. CT angiogram was performed from the level of the alida to the top of the head following the IV administration of contrast.   Sagittal and coronal reconstructions and maximum intensity projection reconstructions were performed. Arterial stenosis percentages are based on NASCET measurement criteria.  3D reconstructions were performed at an independent workstation.    COMPARISON:  10/07/2022    FINDINGS:  The visualized thoracic aorta is of normal caliber.  A triple vessel aortic arch is identified with a great vessels being patent.  The common carotid, carotid bulbs, and internal carotid arteries are widely patent.  Symmetric arborization of the anterior middle cerebral arteries without evidence for stenosis, occlusion, aneurysm, or thrombus.   The anterior communicating artery is patent.    The vertebral arteries are widely patent codominant.  Cyst the basilar artery is widely patent.  Fetal origin of the right posterior cerebral artery with hypoplastic/aplastic P1 segment.  The posterior cerebral arteries are widely patent without evidence for stenosis, occlusion, aneurysm, or thrombus.  The posterior communicating artery on the left is within normal limits.    The superstiff fistula deep venous structures are patent.  Somewhat hypoplastic appearance of the left transverse sinus.    The soft tissues of the neck are normal.  No evidence for adenopathy.    Visualized mediastinum is normal.  Bibasilar atelectatic changes well as fluid tracking in the major fissure.  Emphysematous changes lungs are identified.  Moderate effusion on the left.  No suspicious osseous lesions.  Impression: No evidence for stenosis, occlusion, aneurysm, or thrombus of the intracranial vasculature.    Electronically signed by: Leonard Bermudez MD  Date:    10/09/2022  Time:    10:58      Dc Castrejon MD   10/10/2022

## 2022-10-11 NOTE — PROGRESS NOTES
Printed scripts given to patient. Refusing inpatient rehab, home with HH and PT. [facility] notified of patient discharge. All qeustions answered. Follow up appointments made, awaiting transportation

## 2022-10-11 NOTE — PLAN OF CARE
Spoke to patient about inpatient rehab. She would rather go home with home health. [place] of choice for [facility] are obtained. Referral sent via [facility].

## 2022-10-11 NOTE — PLAN OF CARE
10/11/22 1555   Final Note   Assessment Type Final Discharge Note   Anticipated Discharge Disposition Home-Health  ([facility])   Post-Acute Status   Post-Acute Authorization Home Health   Home Health Status Set-up Complete/Auth obtained   Discharge information sent via [facility] and spoke to LACEY Acosta.

## 2022-10-11 NOTE — DISCHARGE INSTRUCTIONS
1- stop smoking  2- no ibuprofen/naproxen , just tylenol for pain if needed  3- take medications as indicated   3- follow up with your doctors as scheduled

## 2022-10-11 NOTE — PT/OT/SLP PROGRESS
Physical Therapy  Treatment    Nimo Dill   MRN: [id number]   Admitting Diagnosis: Falls frequently       PT Start Time: 1400     PT Stop Time: 1424    PT Total Time (min): 24 min       Billable Minutes:  Gait Training 12 and Therapeutic Exercise 12    Treatment Type: Treatment  PT/PTA: PTA     PTA Visit Number: 2       General Precautions: Standard, aspiration  Orthopedic Precautions: N/A   Braces:    Respiratory Status: Room air    Spiritual, Cultural Beliefs, Yazdanism Practices, Values that Affect Care: no    Subjective:  Communicated with NSG prior to session.         Objective:        Functional Mobility:  Bed Mobility:    Supine to/from sit. Min A to come to sitting.    Transfers:   Sit to/from stand. Hygiene in standing. Pt somewhat unsteadiness when removing 1 hand off the RW.    Gait:    Pt ambulated ~80ft. Slow but steady step through gait pattern. Decreased step through gait pattern.      AM-PAC 6 CLICK MOBILITY  How much help from another person does this patient currently need?   1 = Unable, Total/Dependent Assistance  2 = A lot, Maximum/Moderate Assistance  3 = A little, Minimum/Contact Guard/Supervision  4 = None, Modified [place]/Independent         AM-PAC Raw Score CMS G-Code Modifier Level of Impairment Assistance   6 % Total / Unable   7 - 9 CM 80 - 100% Maximal Assist   10 - 14 CL 60 - 80% Moderate Assist   15 - 19 CK 40 - 60% Moderate Assist   20 - 22 CJ 20 - 40% Minimal Assist   23 CI 1-20% SBA / CGA   24 CH 0% Independent/ Mod I     Patient left up in chair with all lines intact and call button in reach.    Assessment:  Nimo Dill is a 66 y.o. female with a medical diagnosis of Falls frequently     Rehab identified problem list/impairments:      Rehab potential is excellent.    Activity tolerance: Excellent    Discharge recommendations:       Barriers to discharge:      Equipment recommendations:       GOALS:   Multidisciplinary Problems       Physical Therapy Goals           Problem: Physical Therapy    Goal Priority Disciplines Outcome Goal Variances Interventions   Physical Therapy Goal     PT, PT/OT Ongoing, Progressing     Description: Goals to be met by: 2022    Patient will increase functional independence with mobility by performin. Supine to sit with Contact Guard Assistance  2. Sit to stand transfer with Contact Guard Assistance  3. Gait  x 200 feet with Contact Guard Assistance using Rolling Walker.                          PLAN:    Patient to be seen 6 x/week  to address the above listed problems via gait training, therapeutic activities, therapeutic exercises  Plan of Care expires: 22  Plan of Care reviewed with: patient         10/11/2022

## 2022-10-11 NOTE — DISCHARGE SUMMARY
"[facility] Medicine Discharge Summary    Admit Date: 10/6/2022  Discharge Date and Time: 10/11/77316:10 PM  Admitting Physician:  Team  Discharging Physician: Dc Castrejon MD.  Primary Care Physician: EILEEN Figueroa  Consults: Gastroenterology and Neurology    Discharge Diagnoses:  Sepsis    CAP- Right lung    -rocephin/azithromycin     UTI (POA)  -e.coli- pansensitive        NSTEMI     Recurrent Syncope/Falls/Gait Ataxia, chronic w findings of chronic cerebellar infarct       several acute nonhemorrhagic lacunar infarcts which involve bilateral cerebellar hemispheres, right aspect of the agustín, left occipital lobe, right basal ganglia and bilateral frontoparietal lobes.  -old lacunar infarcts which involve bilateral cerebellar hemispheres and the right corona radiata  -benefits from linq monitor/referred to cardiology  -DOAC/ asa 81 mgs        Hypochromic  microcytic Anemia/Melena      Iron deficiency anemia       Acute symptomatic anemia    -  transfused 2 units prbc       Severe PCM with 60 # weight loss      Tobacco Use Disorder       Muscular and physical deconditioning       Hx of gastric ulcers/ gastrectomy    -ulcerations w/o bleeding at gastrectomy site.      Hld on statin             Hospital Course:   Mrs. Dill is a 67 yo female with pmhx tobacco use, PUD/gastric perforation s/p gastrectomy in 2018, recurrent falls ongoing for the past year and 60 lbs unintentional weight loss. She is not a good historian. She underwent an MRI Brain w/o on 9/13/22 due to recurrent falls with results showing old cerebellar infarcts and an old right centrum semi oval infarct with chronic age related volume loss and chronic microvascular disease. She states "my  couldn't take it anymore so he sent me here". She states she has been falling more frequently over the past 2 weeks, also reports subjective fever, cough, dysuria, and intermittent episodes of chest pain " however there is none present today.  She has urinary incontinence and wears a diaper.         Initial ED VS: Temperature 97.9°, heart rate 103, respirations 20, /71, oxygenation 97% room air.  CT head negative for acute intracranial finding.  Labs remarkable for leukocytosis of 18,600 with a left shift, hemoglobin 9.7, hematocrit 32.5, troponin 0.347 . EKG NSR with non-specific ST changes, right atrial enlargement..  UA positive for UTI and initiated on rocephin . BUN 31.5. Pt is being admitted to . [facility] consulted           Patient seen and examined this evening, she is a poor historian.  She denies history of CAD/AMI.  She reports dark tarry stools but unable to tell me for how long. Daily NSAID use, hx of PUD with gastric ulcer perf in th past.  She reports multiple recurrent falls over the past year however they have worsened over the past few weeks and does state that at times she loses consciousness with her falls She has never had routine colonoscopy screening. Mri  brain was done with several acute nonhemorrhagic lacunar infarcts which involve bilateral cerebellar hemispheres, right aspect of the agustín, left occipital lobe, right basal ganglia and bilateral frontoparietal lobes.Pt was seen by neurology with recs for DOAC as well as Linq recorder as outpt, referred to cardiology.     On 10-8-22 Hematocrit at 24.5 Pt receive 2 units prbc. Then , had a fall in room while attempting to get out of bed. Skin breakdowns to LUE, no loc. Gi consulted and pt was    cleared by neuro for egd . Case discussed with pt and . Pt refers nsaid abuse until around 2 weeks ago on a daily basis.    Urine culture was pertinent for e.coli pansensitive on ricephin     On 10/10/22 post egd(see below)... some ulceration at site of gastrectomy anastomosis but no active bleeding. Pt was started on Doac/asa 81 and tolerating . She has been getting PT and skilled placement was recommended but pt has opted for home health with  PT. Pt has received 5 days of ferlicit and will be discharge on ppi po bid./carafate She will follow up with cis for eval concerning Linq recorder. Pt voices no complains and overall her condition has been improved.  Pt was seen and examined on the day of discharge  Vitals:  VITAL SIGNS: 24 HRS MIN & MAX LAST   Temp  Min: 97.5 °F (36.4 °C)  Max: 99.7 °F (37.6 °C) 98.6 °F (37 °C)   BP  Min: 95/53  Max: 163/96 (!) 161/112     Pulse  Min: 75  Max: 113  75   Resp  Min: 15  Max: 30 17   SpO2  Min: 90 %  Max: 97 % (!) 93 %         Physical Exam:  General:  Cachectic female in no acute distress,     HEENT: NC/AT, edentulous, tongue red with white bumps in the back.     Neck:  No JVD     Chest: CTABL     CVS: Regular rhythm. Normal S1/S2.     Abdomen: nondistended, normoactive BS, soft and non-tender.     MSK: No obvious deformity or joint swelling diffuse muscle atrophy     Skin: multiple bruises to BUE, new skin breakdowns to RUE from fall     Neuro: AAOx3, speech slow but appropriate,  motor strength 5/5 BLE and BUE, Finger to nose test WNL,ataxic.      Psych: Cooperative    Procedures Performed: No admission procedures for hospital encounter.     Significant Diagnostic Studies: See Full reports for all details    Recent Labs   Lab 10/08/22  0903 10/09/22  0839 10/10/22  0234   WBC 14.6* 14.3* 13.2*   RBC 3.13* 4.19* 4.08*   HGB 7.7* 11.2* 10.6*   HCT 26.3* 35.0* 34.4*   MCV 84.0 83.5 84.3   MCH 24.6* 26.7* 26.0*   MCHC 29.3* 32.0* 30.8*   RDW 17.8* 16.5 16.9    214 226   MPV 11.7* 11.6* 11.6*       Recent Labs   Lab 10/06/22  1841 10/07/22  0715 10/08/22  0418 10/09/22  0605 10/10/22  0234      < > 137 138 138   K 4.0   < > 3.9 3.7 3.8   CO2 21*   < > 19* 25 23   BUN 31.5*   < > 11.6 9.9 11.1   CREATININE 0.74   < > 0.66 0.66 0.96   CALCIUM 8.5   < > 7.9* 8.0* 7.8*   MG 1.70   < > 1.30* 1.30* 1.80   ALBUMIN 2.0*  --  1.6*  --   --    ALKPHOS 195*  --  150  --   --    ALT 26  --  19  --   --    AST 20  --   16  --   --    BILITOT 0.8  --  0.6  --   --     < > = values in this interval not displayed.        Microbiology Results (last 7 days)       Procedure Component Value Units Date/Time    Blood culture #1 **CANNOT BE ORDERED STAT** [[id number]]  (Normal) Collected: 10/06/22 2129    Order Status: Completed Specimen: Blood Updated: 10/10/22 2202     CULTURE, BLOOD ([facility]) No Growth At 96 Hours    Blood culture #2 **CANNOT BE ORDERED STAT** [[id number]]  (Normal) Collected: 10/06/22 2129    Order Status: Completed Specimen: Blood Updated: 10/10/22 2202     CULTURE, BLOOD ([facility]) No Growth At 96 Hours    Urine culture [[id number]]  (Abnormal)  (Susceptibility) Collected: 10/06/22 2146    Order Status: Completed Specimen: Urine Updated: 10/09/22 1023     Urine Culture >/= 100,000 colonies/ml Escherichia coli             CTA Head and Neck (xpd)  Narrative: EXAMINATION:  CTA HEAD AND NECK (XPD)    CLINICAL HISTORY:  Stroke/TIA, determine embolic source;    TECHNIQUE:  Non contrast low dose axial images were obtained through the head. CT angiogram was performed from the level of the alida to the top of the head following the IV administration of contrast.   Sagittal and coronal reconstructions and maximum intensity projection reconstructions were performed. Arterial stenosis percentages are based on NASCET measurement criteria.  3D reconstructions were performed at an independent workstation.    COMPARISON:  10/07/2022    FINDINGS:  The visualized thoracic aorta is of normal caliber.  A triple vessel aortic arch is identified with a great vessels being patent.  The common carotid, carotid bulbs, and internal carotid arteries are widely patent.  Symmetric arborization of the anterior middle cerebral arteries without evidence for stenosis, occlusion, aneurysm, or thrombus.  The anterior communicating artery is patent.    The vertebral arteries are widely patent codominant.  Cyst the basilar artery is widely patent.  Fetal origin of  the right posterior cerebral artery with hypoplastic/aplastic P1 segment.  The posterior cerebral arteries are widely patent without evidence for stenosis, occlusion, aneurysm, or thrombus.  The posterior communicating artery on the left is within normal limits.    The superstiff fistula deep venous structures are patent.  Somewhat hypoplastic appearance of the left transverse sinus.    The soft tissues of the neck are normal.  No evidence for adenopathy.    Visualized mediastinum is normal.  Bibasilar atelectatic changes well as fluid tracking in the major fissure.  Emphysematous changes lungs are identified.  Moderate effusion on the left.  No suspicious osseous lesions.  Impression: No evidence for stenosis, occlusion, aneurysm, or thrombus of the intracranial vasculature.    Electronically signed by: Leonard Bermudez MD  Date:    10/09/2022  Time:    10:58         Medication List        ASK your doctor about these medications      ALPRAZolam 0.5 MG tablet  Commonly known as: XANAX     ARIPiprazole 10 MG Tab  Commonly known as: ABILIFY     cyproheptadine 4 mg tablet  Commonly known as: PERIACTIN     HYDROcodone-acetaminophen 5-325 mg per tablet  Commonly known as: NORCO     ketoconazole 2 % cream  Commonly known as: NIZORAL  Apply topically once daily. for 7 days     naproxen 500 MG tablet  Commonly known as: NAPROSYN     omeprazole 40 MG capsule  Commonly known as: PriLOSEC  Take 1 capsule (40 mg total) by mouth 2 (two) times daily before meals.     pantoprazole 40 MG tablet  Commonly known as: PROTONIX     * paroxetine 10 MG tablet  Commonly known as: PAXIL     * paroxetine 40 MG tablet  Commonly known as: PAXIL     promethazine 25 MG tablet  Commonly known as: PHENERGAN  Take 1 tablet (25 mg total) by mouth every 6 (six) hours as needed for Nausea.     sucralfate 1 gram tablet  Commonly known as: CARAFATE     * varenicline 0.5 mg (11)- 1 mg (42) tablet  Commonly known as: CHANTIX ROSA  Take one 0.5mg tablet by mouth  once daily for 3 days, then increase to one 0.5mg tablet twice daily for 4 days, then increase to one 1mg tablet twice daily.     * varenicline 1 mg Tab  Commonly known as: CHANTIX  Take 1 tablet (1 mg total) by mouth 2 (two) times daily.     zolpidem 10 mg Tab  Commonly known as: AMBIEN           * This list has 4 medication(s) that are the same as other medications prescribed for you. Read the directions carefully, and ask your doctor or other care provider to review them with you.                   Explained in detail to the patient about the discharge plan, medications, and follow-up visits. Pt understands and agrees with the treatment plan  Discharge Disposition:home with home health and PT  Discharged Condition: stable  Diet- cardiac  Dietary Orders (From admission, onward)       Start     Ordered    10/10/22 1424  Diet heart healthy  Diet effective now         10/10/22 1424    10/07/22 1835  Dietary nutrition supplements Prosource No Carb; TID  Continuous        Question Answer Comment   Select PO Supplement: Prosource No Carb    Frequency: TID        10/07/22 1835                   Medications Per DC med rec  Activities as tolerated   Follow-up Information       Leonard Lugo Jr, MD Follow up.    Specialty: Cardiology  Why: 1-2 weeks  Contact information:  [address]  [facility] [id number] [phone]               EILEEN Figueroa Follow up.    Specialty: Family Medicine  Contact information:  [address] [place] [address] [address]  [facility] [id number] [phone]               [facility] Follow up.    Specialties: Home Health Services, Home Therapy Services, Home Living Aide Services  Why: This is your home health provider. You may contact the office for any questions or concerns regarding your home health services.  Contact information:  [address] [facility] [address] [phone]                         For further questions contact hospitalist office    Discharge time 33  minutes    For worsening symptoms, chest pain, shortness of breath, increased abdominal pain, high grade fever, stroke or stroke like symptoms, immediately go to the nearest Emergency Room or call 911 as soon as possible.      Dc Martin M.D, on 10/11/2022. at 1:10 PM.

## 2022-10-11 NOTE — PT/OT/SLP PROGRESS
Occupational Therapy      Patient Name:  Nimo Dill   MRN:  [id number]    Patient not seen today secondary to CNA currently in patient room performing bed bath. Will follow-up as able.    10/11/2022

## 2022-10-17 ENCOUNTER — HOSPITAL ENCOUNTER (INPATIENT)
Facility: HOSPITAL | Age: 67
LOS: 11 days | Discharge: NURSING, REHAB OR LONG-TERM CARE | DRG: 040 | End: 2022-10-28
Attending: STUDENT IN AN ORGANIZED HEALTH CARE EDUCATION/TRAINING PROGRAM | Admitting: INTERNAL MEDICINE
Payer: MEDICARE

## 2022-10-17 DIAGNOSIS — J18.9 COMMUNITY ACQUIRED PNEUMONIA, UNSPECIFIED LATERALITY: ICD-10-CM

## 2022-10-17 DIAGNOSIS — R07.9 CHEST PAIN: ICD-10-CM

## 2022-10-17 DIAGNOSIS — R29.6 FREQUENT FALLS: ICD-10-CM

## 2022-10-17 DIAGNOSIS — E44.1 MILD PROTEIN-CALORIE MALNUTRITION: Chronic | ICD-10-CM

## 2022-10-17 DIAGNOSIS — R55 SYNCOPE: ICD-10-CM

## 2022-10-17 DIAGNOSIS — I82.4Y9: ICD-10-CM

## 2022-10-17 DIAGNOSIS — K91.89 IRON DEFICIENCY ANEMIA AFTER GASTRECTOMY: Chronic | ICD-10-CM

## 2022-10-17 DIAGNOSIS — R29.6 FALLS FREQUENTLY: Chronic | ICD-10-CM

## 2022-10-17 DIAGNOSIS — S00.03XA CONTUSION OF SCALP, INITIAL ENCOUNTER: Primary | ICD-10-CM

## 2022-10-17 DIAGNOSIS — I63.9 STROKE: ICD-10-CM

## 2022-10-17 DIAGNOSIS — R79.89 ELEVATED TROPONIN: ICD-10-CM
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
## 2022-10-17 DIAGNOSIS — D50.8 IRON DEFICIENCY ANEMIA AFTER GASTRECTOMY: Chronic | ICD-10-CM

## 2022-10-17 DIAGNOSIS — F17.200 TOBACCO DEPENDENCE: ICD-10-CM

## 2022-10-17 LAB
ALBUMIN SERPL-MCNC: 1.8 GM/DL (ref 3.4–4.8)
ALBUMIN/GLOB SERPL: 0.6 RATIO (ref 1.1–2)
ALP SERPL-CCNC: 107 UNIT/L (ref 40–150)
ALT SERPL-CCNC: 16 UNIT/L (ref 0–55)
APTT PPP: 28.8 SECONDS (ref 23.2–33.7)
AST SERPL-CCNC: 20 UNIT/L (ref 5–34)
BASOPHILS # BLD AUTO: 0.23 X10(3)/MCL (ref 0–0.2)
BASOPHILS NFR BLD AUTO: 1.6 %
BILIRUBIN DIRECT+TOT PNL SERPL-MCNC: 0.2 MG/DL
BNP BLD-MCNC: 688.7 PG/ML
BUN SERPL-MCNC: 16.4 MG/DL (ref 9.8–20.1)
CALCIUM SERPL-MCNC: 8.5 MG/DL (ref 8.4–10.2)
CHLORIDE SERPL-SCNC: 106 MMOL/L (ref 98–107)
CO2 SERPL-SCNC: 27 MMOL/L (ref 23–31)
CREAT SERPL-MCNC: 0.76 MG/DL (ref 0.55–1.02)
EOSINOPHIL # BLD AUTO: 0.06 X10(3)/MCL (ref 0–0.9)
EOSINOPHIL NFR BLD AUTO: 0.4 %
ERYTHROCYTE [DISTWIDTH] IN BLOOD BY AUTOMATED COUNT: 17.5 % (ref 11.5–17)
ETHANOL SERPL-MCNC: <10 MG/DL
GFR SERPLBLD CREATININE-BSD FMLA CKD-EPI: >60 MLS/MIN/1.73/M2
GLOBULIN SER-MCNC: 3.2 GM/DL (ref 2.4–3.5)
GLUCOSE SERPL-MCNC: 95 MG/DL (ref 82–115)
HCT VFR BLD AUTO: 33 % (ref 37–47)
HGB BLD-MCNC: 9.9 GM/DL (ref 12–16)
IMM GRANULOCYTES # BLD AUTO: 0.2 X10(3)/MCL (ref 0–0.04)
IMM GRANULOCYTES NFR BLD AUTO: 1.4 %
INR BLD: 1.02 (ref 0–1.3)
LACTATE SERPL-SCNC: 1.9 MMOL/L (ref 0.5–2.2)
LIPASE SERPL-CCNC: 10 U/L
LYMPHOCYTES # BLD AUTO: 2.08 X10(3)/MCL (ref 0.6–4.6)
LYMPHOCYTES NFR BLD AUTO: 14.1 %
MAGNESIUM SERPL-MCNC: 1.6 MG/DL (ref 1.6–2.6)
MCH RBC QN AUTO: 26.8 PG (ref 27–31)
MCHC RBC AUTO-ENTMCNC: 30 MG/DL (ref 33–36)
MCV RBC AUTO: 89.4 FL (ref 80–94)
MONOCYTES # BLD AUTO: 0.87 X10(3)/MCL (ref 0.1–1.3)
MONOCYTES NFR BLD AUTO: 5.9 %
NEUTROPHILS # BLD AUTO: 11.4 X10(3)/MCL (ref 2.1–9.2)
NEUTROPHILS NFR BLD AUTO: 76.6 %
NRBC BLD AUTO-RTO: 0 %
PLATELET # BLD AUTO: 424 X10(3)/MCL (ref 130–400)
PMV BLD AUTO: 10.2 FL (ref 7.4–10.4)
POTASSIUM SERPL-SCNC: 4.3 MMOL/L (ref 3.5–5.1)
PROT SERPL-MCNC: 5 GM/DL (ref 5.8–7.6)
PROTHROMBIN TIME: 13.3 SECONDS (ref 12.5–14.5)
RBC # BLD AUTO: 3.69 X10(6)/MCL (ref 4.2–5.4)
SODIUM SERPL-SCNC: 142 MMOL/L (ref 136–145)
TROPONIN I SERPL-MCNC: 0.04 NG/ML (ref 0–0.04)
TSH SERPL-ACNC: 4.82 UIU/ML (ref 0.35–4.94)
WBC # SPEC AUTO: 14.8 X10(3)/MCL (ref 4.5–11.5)

## 2022-10-17 PROCEDURE — 63600175 PHARM REV CODE 636 W HCPCS: Performed by: STUDENT IN AN ORGANIZED HEALTH CARE EDUCATION/TRAINING PROGRAM

## 2022-10-17 PROCEDURE — 85610 PROTHROMBIN TIME: CPT | Performed by: STUDENT IN AN ORGANIZED HEALTH CARE EDUCATION/TRAINING PROGRAM

## 2022-10-17 PROCEDURE — 83880 ASSAY OF NATRIURETIC PEPTIDE: CPT | Performed by: STUDENT IN AN ORGANIZED HEALTH CARE EDUCATION/TRAINING PROGRAM

## 2022-10-17 PROCEDURE — 90715 TDAP VACCINE 7 YRS/> IM: CPT | Performed by: STUDENT IN AN ORGANIZED HEALTH CARE EDUCATION/TRAINING PROGRAM

## 2022-10-17 PROCEDURE — 93005 ELECTROCARDIOGRAM TRACING: CPT

## 2022-10-17 PROCEDURE — 21400001 HC TELEMETRY ROOM

## 2022-10-17 PROCEDURE — 85025 COMPLETE CBC W/AUTO DIFF WBC: CPT | Performed by: STUDENT IN AN ORGANIZED HEALTH CARE EDUCATION/TRAINING PROGRAM

## 2022-10-17 PROCEDURE — 96375 TX/PRO/DX INJ NEW DRUG ADDON: CPT

## 2022-10-17 PROCEDURE — 83605 ASSAY OF LACTIC ACID: CPT | Performed by: STUDENT IN AN ORGANIZED HEALTH CARE EDUCATION/TRAINING PROGRAM

## 2022-10-17 PROCEDURE — 96374 THER/PROPH/DIAG INJ IV PUSH: CPT

## 2022-10-17 PROCEDURE — 82077 ASSAY SPEC XCP UR&BREATH IA: CPT | Performed by: STUDENT IN AN ORGANIZED HEALTH CARE EDUCATION/TRAINING PROGRAM

## 2022-10-17 PROCEDURE — 93010 EKG 12-LEAD: ICD-10-PCS | Mod: ,,, | Performed by: INTERNAL MEDICINE

## 2022-10-17 PROCEDURE — 25000003 PHARM REV CODE 250: Performed by: STUDENT IN AN ORGANIZED HEALTH CARE EDUCATION/TRAINING PROGRAM

## 2022-10-17 PROCEDURE — 25000003 PHARM REV CODE 250: Performed by: PHYSICIAN ASSISTANT

## 2022-10-17 PROCEDURE — 84443 ASSAY THYROID STIM HORMONE: CPT | Performed by: STUDENT IN AN ORGANIZED HEALTH CARE EDUCATION/TRAINING PROGRAM

## 2022-10-17 PROCEDURE — 84484 ASSAY OF TROPONIN QUANT: CPT | Performed by: STUDENT IN AN ORGANIZED HEALTH CARE EDUCATION/TRAINING PROGRAM

## 2022-10-17 PROCEDURE — 83735 ASSAY OF MAGNESIUM: CPT | Performed by: STUDENT IN AN ORGANIZED HEALTH CARE EDUCATION/TRAINING PROGRAM

## 2022-10-17 PROCEDURE — 93010 ELECTROCARDIOGRAM REPORT: CPT | Mod: ,,, | Performed by: INTERNAL MEDICINE

## 2022-10-17 PROCEDURE — 11000001 HC ACUTE MED/SURG PRIVATE ROOM

## 2022-10-17 PROCEDURE — 36415 COLL VENOUS BLD VENIPUNCTURE: CPT | Performed by: STUDENT IN AN ORGANIZED HEALTH CARE EDUCATION/TRAINING PROGRAM

## 2022-10-17 PROCEDURE — 25000003 PHARM REV CODE 250: Performed by: NURSE PRACTITIONER

## 2022-10-17 PROCEDURE — 99285 EMERGENCY DEPT VISIT HI MDM: CPT | Mod: 25

## 2022-10-17 PROCEDURE — 83690 ASSAY OF LIPASE: CPT | Performed by: STUDENT IN AN ORGANIZED HEALTH CARE EDUCATION/TRAINING PROGRAM

## 2022-10-17 PROCEDURE — 90471 IMMUNIZATION ADMIN: CPT | Performed by: STUDENT IN AN ORGANIZED HEALTH CARE EDUCATION/TRAINING PROGRAM

## 2022-10-17 PROCEDURE — 80053 COMPREHEN METABOLIC PANEL: CPT | Performed by: STUDENT IN AN ORGANIZED HEALTH CARE EDUCATION/TRAINING PROGRAM

## 2022-10-17 PROCEDURE — 85730 THROMBOPLASTIN TIME PARTIAL: CPT | Performed by: STUDENT IN AN ORGANIZED HEALTH CARE EDUCATION/TRAINING PROGRAM

## 2022-10-17 RX ORDER — IBUPROFEN 200 MG
16 TABLET ORAL
Status: DISCONTINUED | OUTPATIENT
Start: 2022-10-17 | End: 2022-10-28 | Stop reason: HOSPADM

## 2022-10-17 RX ORDER — ACETAMINOPHEN 500 MG
1000 TABLET ORAL
Status: COMPLETED | OUTPATIENT
Start: 2022-10-17 | End: 2022-10-17

## 2022-10-17 RX ORDER — PROCHLORPERAZINE EDISYLATE 5 MG/ML
5 INJECTION INTRAMUSCULAR; INTRAVENOUS EVERY 6 HOURS PRN
Status: DISCONTINUED | OUTPATIENT
Start: 2022-10-17 | End: 2022-10-28 | Stop reason: HOSPADM

## 2022-10-17 RX ORDER — BISACODYL 10 MG
10 SUPPOSITORY, RECTAL RECTAL DAILY PRN
Status: DISCONTINUED | OUTPATIENT
Start: 2022-10-17 | End: 2022-10-28 | Stop reason: HOSPADM

## 2022-10-17 RX ORDER — ACETAMINOPHEN 325 MG/1
650 TABLET ORAL EVERY 4 HOURS PRN
Status: DISCONTINUED | OUTPATIENT
Start: 2022-10-17 | End: 2022-10-28 | Stop reason: HOSPADM

## 2022-10-17 RX ORDER — FENTANYL CITRATE 50 UG/ML
INJECTION, SOLUTION INTRAMUSCULAR; INTRAVENOUS CODE/TRAUMA/SEDATION MEDICATION
Status: COMPLETED | OUTPATIENT
Start: 2022-10-17 | End: 2022-10-17

## 2022-10-17 RX ORDER — IBUPROFEN 200 MG
24 TABLET ORAL
Status: DISCONTINUED | OUTPATIENT
Start: 2022-10-17 | End: 2022-10-28 | Stop reason: HOSPADM

## 2022-10-17 RX ORDER — FENTANYL CITRATE 50 UG/ML
INJECTION, SOLUTION INTRAMUSCULAR; INTRAVENOUS
Status: DISPENSED
Start: 2022-10-17 | End: 2022-10-18

## 2022-10-17 RX ORDER — ONDANSETRON 2 MG/ML
INJECTION INTRAMUSCULAR; INTRAVENOUS CODE/TRAUMA/SEDATION MEDICATION
Status: COMPLETED | OUTPATIENT
Start: 2022-10-17 | End: 2022-10-17

## 2022-10-17 RX ORDER — ACETAMINOPHEN 325 MG/1
650 TABLET ORAL EVERY 8 HOURS PRN
Status: DISCONTINUED | OUTPATIENT
Start: 2022-10-17 | End: 2022-10-28 | Stop reason: HOSPADM

## 2022-10-17 RX ORDER — MAG HYDROX/ALUMINUM HYD/SIMETH 200-200-20
30 SUSPENSION, ORAL (FINAL DOSE FORM) ORAL EVERY 4 HOURS PRN
Status: DISCONTINUED | OUTPATIENT
Start: 2022-10-17 | End: 2022-10-28 | Stop reason: HOSPADM

## 2022-10-17 RX ORDER — ONDANSETRON 2 MG/ML
INJECTION INTRAMUSCULAR; INTRAVENOUS
Status: DISPENSED
Start: 2022-10-17 | End: 2022-10-18

## 2022-10-17 RX ORDER — POLYETHYLENE GLYCOL 3350 17 G/17G
17 POWDER, FOR SOLUTION ORAL 2 TIMES DAILY PRN
Status: DISCONTINUED | OUTPATIENT
Start: 2022-10-17 | End: 2022-10-28 | Stop reason: HOSPADM

## 2022-10-17 RX ORDER — SODIUM CHLORIDE 9 MG/ML
INJECTION, SOLUTION INTRAVENOUS CONTINUOUS
Status: DISCONTINUED | OUTPATIENT
Start: 2022-10-17 | End: 2022-10-18

## 2022-10-17 RX ORDER — ONDANSETRON 2 MG/ML
4 INJECTION INTRAMUSCULAR; INTRAVENOUS EVERY 8 HOURS PRN
Status: DISCONTINUED | OUTPATIENT
Start: 2022-10-17 | End: 2022-10-28 | Stop reason: HOSPADM

## 2022-10-17 RX ORDER — GLUCAGON 1 MG
1 KIT INJECTION
Status: DISCONTINUED | OUTPATIENT
Start: 2022-10-17 | End: 2022-10-28 | Stop reason: HOSPADM

## 2022-10-17 RX ORDER — AMOXICILLIN 250 MG
1 CAPSULE ORAL 2 TIMES DAILY PRN
Status: DISCONTINUED | OUTPATIENT
Start: 2022-10-17 | End: 2022-10-28 | Stop reason: HOSPADM

## 2022-10-17 RX ADMIN — TETANUS TOXOID, REDUCED DIPHTHERIA TOXOID AND ACELLULAR PERTUSSIS VACCINE, ADSORBED 0.5 ML: 5; 2.5; 8; 8; 2.5 SUSPENSION INTRAMUSCULAR at 01:10

## 2022-10-17 RX ADMIN — ACETAMINOPHEN 1000 MG: 500 TABLET, FILM COATED ORAL at 01:10

## 2022-10-17 RX ADMIN — FENTANYL CITRATE 50 MCG: 50 INJECTION, SOLUTION INTRAMUSCULAR; INTRAVENOUS at 01:10

## 2022-10-17 RX ADMIN — SODIUM CHLORIDE: 9 INJECTION, SOLUTION INTRAVENOUS at 11:10

## 2022-10-17 RX ADMIN — ONDANSETRON 4 MG: 2 INJECTION INTRAMUSCULAR; INTRAVENOUS at 01:10

## 2022-10-17 RX ADMIN — ACETAMINOPHEN 650 MG: 325 TABLET ORAL at 11:10

## 2022-10-17 NOTE — Clinical Note
Diagnosis: Fall [176443]   Admitting Provider:: KAYE PEREZ [[id number]]   Future Attending Provider: KAYE PEREZ [[id number]]   Reason for IP Medical Treatment  (Clinical interventions that can only be accomplished in the IP setting? ) :: Frequent falls, placement   Estimated Length of Stay:: 2 midnights   I certify that Inpatient services for greater than or equal to 2 midnights are medically necessary:: Yes   Plans for Post-Acute care--if anticipated (pick the single best option):: D. Skilled Nursing Placement

## 2022-10-17 NOTE — ED NOTES
Pt arrived to trauma 1 via wheelchair with ED staff.  Pt assisted to stretcher free from injury.  Pt denies blurred vision nausea or vomiting at present.  C collar placed on arrival.  Pt c/o pain to post. Head neck and Right ear.+ Blood thinners for hx of TIA

## 2022-10-17 NOTE — ED PROVIDER NOTES
Encounter Date: 10/17/2022    SCRIBE #1 NOTE: IErin, am scribing for, and in the presence of,  uKnal Ceja MD. I have scribed the following portions of the note - Other sections scribed: HPI, ROS, PE, EKG.     History   No chief complaint on file. Trauma, Fall    66 year old female presents to the ED as a level 2 trauma following a ground level fall this morning 05:36. The patient reports that she had a syncopal episode and hit the back of her head on the floor. She presents to the ED with a contusion to the right posterior head with associated pain. She is on Eliquis. She was discharged from the hospital last Tuesday after cerebellar stroke and has had continued falls at home. At the time of discharge, recommended rehab/SNF.     The history is provided by the patient. No  was used.   Fall  Illness onset: 05:36 am. The fall occurred while walking. Distance fallen: ground level. The point of impact was the head. The pain is present in the head. She was Ambulatory at the scene. Associated symptoms include headaches and loss of consciousness. Pertinent negatives include no fever and no abdominal pain. She has tried nothing for the symptoms.   Review of patient's allergies indicates:   Allergen Reactions    Penicillins Hives and Blisters     Past Medical History:   Diagnosis Date    Common bile duct dilatation     Epigastric abdominal pain     Gastric ulcer, acute with hemorrhage and perforation     PUD (peptic ulcer disease)      Past Surgical History:   Procedure Laterality Date    APPENDECTOMY       SECTION, CLASSIC      CHOLECYSTECTOMY      Distal gastrectomy      ESOPHAGOGASTRODUODENOSCOPY N/A 10/10/2022    Procedure: EGD;  Surgeon: Nati Smith MD;  Location: [facility] ENDOSCOPY;  Service: Gastroenterology;  Laterality: N/A;    GASTRECTOMY      HYSTERECTOMY      INSERTION OF IMPLANTABLE LOOP RECORDER N/A 10/21/2022    Procedure: Insertion, Implantable Loop Recorder;   Surgeon: Chas Gibson MD;  Location: [facility] CATH LAB;  Service: Cardiology;  Laterality: N/A;    Rectovaginal fistula repair      TONSILLECTOMY      VAGOTOMY AND PYLOROPLASTY       Family History   Problem Relation Age of Onset    Stroke Father     Hypertension Father      Social History     Tobacco Use    Smoking status: Every Day     Packs/day: 1.00     Years: 40.00     Pack years: 40.00     Types: Cigarettes    Smokeless tobacco: Never   Substance Use Topics    Alcohol use: No    Drug use: No     Review of Systems   Constitutional:  Negative for fever.   HENT:  Negative for sore throat.    Eyes:  Negative for visual disturbance.   Respiratory:  Negative for shortness of breath.    Cardiovascular:  Negative for chest pain.   Gastrointestinal:  Negative for abdominal pain.   Genitourinary:  Negative for dysuria.   Musculoskeletal:  Negative for joint swelling.   Skin:  Negative for rash.   Neurological:  Positive for loss of consciousness and headaches. Negative for seizures and weakness.   Psychiatric/Behavioral:  Negative for confusion.      Physical Exam     Initial Vitals [10/17/22 1257]   BP Pulse Resp Temp SpO2   136/71 76 (!) 22 98.1 °F (36.7 °C) (!) 87 %      MAP       --         Physical Exam    Nursing note and vitals reviewed.  Constitutional: She appears well-developed and well-nourished. No distress.   HENT:   Head: Normocephalic.   Nose: Nose normal.   Mouth/Throat: Oropharynx is clear and moist.   Contusion to the right posterior scalp. No other abrasions, contusions, lacerations to the scalp or face.  No superior inferior orbital ridge tenderness to palpation.  No zygomatic arch tenderness to palpation.  No epistaxis.  No CSF rhinorrhea.  No septal hematoma.  No intraoral injuries noted.  Normal external ear.  No raccoon eyes.  No Carney sign.     Eyes: Conjunctivae and EOM are normal. Pupils are equal, round, and reactive to light.   Pupils 5 mm and reactive    Neck: Neck supple. No tracheal deviation  present.   Normal range of motion.  Cardiovascular:  Normal rate, regular rhythm, normal heart sounds, intact distal pulses and normal pulses.           No murmur heard.  Circulation intact    Pulmonary/Chest: Breath sounds normal. No stridor. No respiratory distress. She has no wheezes. She has no rhonchi. She has no rales. She exhibits no tenderness.   Airway intact, clear breath sounds    Abdominal: Abdomen is soft. Bowel sounds are normal. She exhibits no distension and no mass. There is no abdominal tenderness. There is no rebound and no guarding.   Musculoskeletal:         General: No tenderness or edema. Normal range of motion.      Cervical back: Normal range of motion and neck supple.      Comments: No midline c, t, or l tenderness, no stepoff, no deformity, no crepitus.     Right upper extremity:  Full range of motion of shoulder, elbow, wrist, no deformity or tenderness to palpation.  Left upper extremity: Full range of motion of shoulder, elbow, wrist, no deformity or tenderness to palpation.  Right lower extremity:  Full range of motion of hip, knee, ankle, no tenderness palpation or deformity noted.  Left lower extremity:  Full range of motion of hip, knee, ankle, no tenderness palpation or deformity noted.       Neurological: She is alert and oriented to person, place, and time. She has normal strength. No cranial nerve deficit or sensory deficit. GCS score is 15. GCS eye subscore is 4. GCS verbal subscore is 5. GCS motor subscore is 6.   Skin: Skin is warm and dry. Capillary refill takes less than 2 seconds. No rash noted. No pallor.   Psychiatric: She has a normal mood and affect. Her behavior is normal. Judgment and thought content normal.       ED Course   Procedures  Labs Reviewed   COMPREHENSIVE METABOLIC PANEL - Abnormal; Notable for the following components:       Result Value    Protein Total 5.0 (*)     Albumin Level 1.8 (*)     Albumin/Globulin Ratio 0.6 (*)     All other components within  normal limits   B-TYPE NATRIURETIC PEPTIDE - Abnormal; Notable for the following components:    Natriuretic Peptide 688.7 (*)     All other components within normal limits   CBC WITH DIFFERENTIAL - Abnormal; Notable for the following components:    WBC 14.8 (*)     RBC 3.69 (*)     Hgb 9.9 (*)     Hct 33.0 (*)     MCH 26.8 (*)     MCHC 30.0 (*)     RDW 17.5 (*)     Platelet 424 (*)     Neut # 11.4 (*)     Baso # 0.23 (*)     IG# 0.20 (*)     All other components within normal limits   APTT - Normal   TROPONIN I - Normal   PROTIME-INR - Normal   LACTIC ACID, PLASMA - Normal   LIPASE - Normal   MAGNESIUM - Normal   TSH - Normal   ALCOHOL,MEDICAL (ETHANOL) - Normal   CBC W/ AUTO DIFFERENTIAL    Narrative:     The following orders were created for panel order CBC auto differential.  Procedure                               Abnormality         Status                     ---------                               -----------         ------                     CBC with Differential[[id number]]        Abnormal            Final result                 Please view results for these tests on the individual orders.   DRUG SCREEN, URINE (BEAKER)   URINALYSIS, REFLEX TO URINE CULTURE     EKG Readings: (Independently Interpreted)   Initial Reading: No STEMI. Rhythm: Normal Sinus Rhythm. Heart Rate: 69. Ectopy: No Ectopy. Conduction: Normal. ST Segments: Normal ST Segments. T Waves Flipped: III and AVF. Axis: Normal. Clinical Impression: Normal Sinus Rhythm   Performed at 13:41   ECG Results              EKG 12-lead (Final result)  Result time 10/17/22 15:46:21      Final result by Interface, Lab [facility] (10/17/22 15:46:21)                   Narrative:    Test Reason : R55,    Vent. Rate : 069 BPM     Atrial Rate : 069 BPM     P-R Int : 124 ms          QRS Dur : 074 ms      QT Int : 376 ms       P-R-T Axes : 059 -01 -29 degrees     QTc Int : 402 ms    Normal sinus rhythm  Possible Anterior infarct ,age undetermined  T wave  abnormality, consider inferior ischemia  Abnormal ECG  Confirmed by Dayron Finney MD ([id number]) on 10/17/2022 3:46:10 PM    Referred By:             Confirmed By:Dayron Finney MD                                  Imaging Results              CT Cervical Spine Without Contrast (Final result)  Result time 10/17/22 13:31:57      Final result by Cheikh French MD (10/17/22 13:31:57)                   Impression:      Loss of the normal lordotic curve of the cervical spine most likely related to spasm but otherwise unremarkable with no evidence of acute fracture or dislocation seen      Electronically signed by: Cheikh French  Date:    10/17/2022  Time:    13:31               Narrative:    EXAMINATION:  CT CERVICAL SPINE WITHOUT CONTRAST    CLINICAL HISTORY:  trauma;    TECHNIQUE:  Low dose axial images, sagittal and coronal reformations were performed though the cervical spine.  Contrast was not administered. Automatic exposure control is utilized to reduce patient radiation exposure.    COMPARISON:  None    FINDINGS:  The vertebral body heights are well maintained. There is some loss of the normal lordotic curve cervical spine most likely related to spasm. No fracture is seen. No dislocation is seen. The odontoid and lateral masses appear grossly unremarkable.  There is some pleural thickening in the right upper hemithorax                                       CT Head Without Contrast (Final result)  Result time 10/17/22 13:27:02      Final result by Cheikh French MD (10/17/22 13:27:02)                   Impression:      Cephalhematoma and soft tissues swelling seen in the scalp in the posterior parietal region      Electronically signed by: Cheikh French  Date:    10/17/2022  Time:    13:27               Narrative:    EXAMINATION:  CT HEAD WITHOUT CONTRAST    CLINICAL HISTORY:  Trauma, fall, hit head;    TECHNIQUE:  Multiple axial images were obtained from the base of the brain to the vertex without  contrast administration.  Sagittal and coronal reconstructions were performed. .Automatic exposure control  (AEC) is utilized to reduce patient radiation exposure.    COMPARISON:  10/09/2022    FINDINGS:  There is no intracranial mass or lesion seen.  No hemorrhage is seen.  No infarct is seen.  The ventricles and basilar cisterns appear normal.  Brain parenchyma appears grossly unremarkable.    Posterior fossa appears normal.  The calvarium is intact.  There is soft tissue swelling in the cephalohematoma in the right posterior parietal region the paranasal sinuses appear grossly unremarkable.                                       X-Ray Pelvis Routine AP (Final result)  Result time 10/17/22 14:29:30      Final result by Seth Orellana MD (10/17/22 14:29:30)                   Impression:      Degenerative changes      Electronically signed by: Seth Orellana  Date:    10/17/2022  Time:    14:29               Narrative:    EXAMINATION:  XR PELVIS ROUTINE AP    CLINICAL HISTORY:  Unspecified fall, initial encounter    COMPARISON:  None.    FINDINGS:  Hardware is identified in relation to the right femur with screw and intramedullary heidi    There is narrowing of the inferior medial aspect of both hip joints with degenerative changes of the lumbosacral spine articular spaces otherwise preserved with smooth articular surfaces    No blastic or lytic lesions.    Soft tissues within normal limits.                                       X-Ray Chest AP Portable (Final result)  Result time 10/17/22 13:16:19      Final result by Link Crane MD (10/17/22 13:16:19)                   Impression:      No acute findings.  Stable chest radiograph.      Electronically signed by: Link Crane  Date:    10/17/2022  Time:    13:16               Narrative:    EXAMINATION:  XR CHEST AP PORTABLE    CLINICAL HISTORY:  fall;    COMPARISON:  6 October 2022    FINDINGS:  Portable frontal view of the chest was obtained. The heart is not  significantly enlarged.  There is aortic atherosclerosis.  Similar irregular opacities are seen right mid lung and bilateral lung bases.  No new focal consolidation.  No pneumothorax.                                       Medications   fentaNYL (SUBLIMAZE) 50 mcg/mL injection (  Not Given 10/17/22 1345)   ondansetron 4 mg/2 mL injection (  Not Given 10/17/22 1345)   ondansetron injection 4 mg (has no administration in time range)   acetaminophen tablet 650 mg (has no administration in time range)   acetaminophen tablet 650 mg (650 mg Oral Given 10/17/22 2340)   glucose chewable tablet 16 g (has no administration in time range)   glucose chewable tablet 24 g (has no administration in time range)   dextrose 50% injection 12.5 g (has no administration in time range)   dextrose 50% injection 25 g (has no administration in time range)   glucagon (human recombinant) injection 1 mg (has no administration in time range)   trazodone split tablet 25 mg (25 mg Oral Given 10/24/22 2043)   prochlorperazine injection Soln 5 mg (has no administration in time range)   polyethylene glycol packet 17 g (has no administration in time range)   senna-docusate 8.6-50 mg per tablet 1 tablet (has no administration in time range)   bisacodyL suppository 10 mg (has no administration in time range)   aluminum-magnesium hydroxide-simethicone 200-200-20 mg/5 mL suspension 30 mL (has no administration in time range)   ARIPiprazole tablet 10 mg (10 mg Oral Given 10/25/22 0915)   cyproheptadine 4 mg tablet 4 mg (4 mg Oral Given 10/25/22 2118)   ferrous sulfate tablet 1 each (1 each Oral Given 10/25/22 0915)   metoprolol succinate (TOPROL-XL) 24 hr tablet 50 mg (50 mg Oral Given 10/25/22 0915)   multivitamin tablet (1 tablet Oral Given 10/25/22 0915)   pantoprazole EC tablet 40 mg (40 mg Oral Given 10/25/22 2118)   sucralfate tablet 1 g (1 g Oral Given 10/25/22 2118)   albuterol-ipratropium 2.5 mg-0.5 mg/3 mL nebulizer solution 3 mL (3 mLs  Nebulization Given 10/26/22 0119)   nicotine 14 mg/24 hr 1 patch (1 patch Transdermal Patch Applied 10/25/22 0915)   atorvastatin tablet 40 mg (40 mg Oral Given 10/25/22 0915)   labetaloL injection 10 mg (has no administration in time range)   traMADoL tablet 50 mg (50 mg Oral Given 10/26/22 0333)   apixaban tablet 5 mg (5 mg Oral Given 10/25/22 2118)   sodium chloride 0.9% flush 10 mL (10 mLs Intravenous Given 10/26/22 0600)     And   sodium chloride 0.9% flush 10 mL (has no administration in time range)   amLODIPine tablet 5 mg (5 mg Oral Not Given 10/25/22 0900)   zinc oxide-cod liver oil 40 % paste ( Topical (Top) Given 10/25/22 2119)   miconazole NITRATE 2 % top powder ( Topical (Top) Given 10/25/22 2119)   LIDOcaine (PF) 10 mg/ml (1%) injection (10 mLs Infiltration Given 10/21/22 1316)   Tdap (BOOSTRIX) vaccine injection 0.5 mL (0.5 mLs Intramuscular Given 10/17/22 1301)   fentaNYL 50 mcg/mL injection (50 mcg Intravenous Given 10/17/22 1331)   ondansetron injection (4 mg Intravenous Given 10/17/22 1331)   acetaminophen tablet 1,000 mg (1,000 mg Oral Given 10/17/22 1345)   HYDROcodone-acetaminophen 5-325 mg per tablet 1 tablet (1 tablet Oral Given 10/18/22 0105)   HYDROcodone-acetaminophen 5-325 mg per tablet 1 tablet (1 tablet Oral Given 10/18/22 0701)   furosemide injection 20 mg (20 mg Intravenous Given 10/18/22 1010)   iopamidoL (ISOVUE-370) injection 100 mL (100 mLs Intravenous Given 10/18/22 1414)   gadobenate dimeglumine (MULTIHANCE) injection 9 mL (9 mLs Intravenous Given 10/18/22 1449)   doxycycline (VIBRAMYCIN) 100 mg in dextrose 5 % 250 mL IVPB (0 mg Intravenous Stopped 10/23/22 1211)   ALPRAZolam tablet 0.25 mg (0.25 mg Oral Given 10/18/22 1759)   levoFLOXacin 500 mg/100 mL IVPB 500 mg (0 mg Intravenous Stopped 10/23/22 0155)   magnesium sulfate 2g in water 50mL IVPB (premix) (0 g Intravenous Stopped 10/23/22 1112)     Medical Decision Making:   Clinical Tests:   Lab Tests: Ordered and  Reviewed  Radiological Study: Ordered and Reviewed  Medical Tests: Ordered and Reviewed  ED Management:  Patient is a 65 y/o female who presents to the ED for fall currently on anticoagulation.  She was recently admitted to the hospital after being found to have cerebellar strokes and started on Eliquis.  See HPI.  See physical exam.  Patient reports multiple falls at home.  At the time of discharge previously it was recommended patient go to skilled nursing facility however patient wanted to try for home PT OT.  Imaging as noted.  EKG is noted.  All results discussed.  Patient states symptoms worsening at home, family concerned, patient would like placement.  All results discussed.  Answered all questions time.  Verbalized understanding agreed to plan.  Discussed with medicine for admission for further evaluation management treatment.  Hemodynamically stable at this time.        Scribe Attestation:   Scribe #1: I performed the above scribed service and the documentation accurately describes the services I performed. I attest to the accuracy of the note.    Attending Attestation:           Physician Attestation for Scribe:  Physician Attestation Statement for Scribe #1: I, reviewed documentation, as scribed by Erin Garcia in my presence, and it is both accurate and complete.           ED Course as of 10/26/22 0614   Mon Oct 17, 2022   1710 Paged hospitalist  [MM]      ED Course User Index  [MM] Erin Garcia                 Clinical Impression:   Final diagnoses:  [W19.XXXA] Fall  [R55] Syncope  [S00.03XA] Contusion of scalp, initial encounter (Primary)  [R29.6] Frequent falls      ED Disposition Condition    Admit                 Kunal Ceja MD  10/26/22 0618

## 2022-10-17 NOTE — Clinical Note
The chest was prepped. The site was prepped with ChloraPrep. The patient was draped. The patient was positioned supine.

## 2022-10-18 PROBLEM — R55 SYNCOPE: Status: ACTIVE | Noted: 2022-10-18

## 2022-10-18 LAB
ALBUMIN SERPL-MCNC: 1.7 GM/DL (ref 3.4–4.8)
ALBUMIN/GLOB SERPL: 0.6 RATIO (ref 1.1–2)
ALP SERPL-CCNC: 103 UNIT/L (ref 40–150)
ALT SERPL-CCNC: 16 UNIT/L (ref 0–55)
AST SERPL-CCNC: 20 UNIT/L (ref 5–34)
BASOPHILS # BLD AUTO: 0.19 X10(3)/MCL (ref 0–0.2)
BASOPHILS NFR BLD AUTO: 1.5 %
BILIRUBIN DIRECT+TOT PNL SERPL-MCNC: 0.2 MG/DL
BUN SERPL-MCNC: 15.5 MG/DL (ref 9.8–20.1)
CALCIUM SERPL-MCNC: 8.4 MG/DL (ref 8.4–10.2)
CHLORIDE SERPL-SCNC: 108 MMOL/L (ref 98–107)
CHOLEST SERPL-MCNC: 97 MG/DL
CHOLEST/HDLC SERPL: 2 {RATIO} (ref 0–5)
CO2 SERPL-SCNC: 26 MMOL/L (ref 23–31)
CORRECTED TEMPERATURE (PCO2): 49 MMHG (ref 35–45)
CORRECTED TEMPERATURE (PH): 7.39 (ref 7.35–7.45)
CORRECTED TEMPERATURE (PO2): 64 MMHG (ref 80–100)
CREAT SERPL-MCNC: 0.75 MG/DL (ref 0.55–1.02)
D DIMER PPP IA.FEU-MCNC: 2.4 UG/ML FEU (ref 0–0.5)
EOSINOPHIL # BLD AUTO: 0.1 X10(3)/MCL (ref 0–0.9)
EOSINOPHIL NFR BLD AUTO: 0.8 %
ERYTHROCYTE [DISTWIDTH] IN BLOOD BY AUTOMATED COUNT: 17.8 % (ref 11.5–17)
GFR SERPLBLD CREATININE-BSD FMLA CKD-EPI: >60 MLS/MIN/1.73/M2
GLOBULIN SER-MCNC: 3 GM/DL (ref 2.4–3.5)
GLUCOSE SERPL-MCNC: 104 MG/DL (ref 82–115)
HCO3 UR-SCNC: 29.7 MMOL/L (ref 22–26)
HCT VFR BLD AUTO: 31.9 % (ref 37–47)
HDLC SERPL-MCNC: 55 MG/DL (ref 35–60)
HGB BLD-MCNC: 9.2 GM/DL (ref 12–16)
HGB BLD-MCNC: 9.6 G/DL (ref 12–16)
IMM GRANULOCYTES # BLD AUTO: 0.15 X10(3)/MCL (ref 0–0.04)
IMM GRANULOCYTES NFR BLD AUTO: 1.2 %
LDLC SERPL CALC-MCNC: 32 MG/DL (ref 50–140)
LEFT CCA DIST DIAS: 16 CM/S
LEFT CCA DIST SYS: 62 CM/S
LEFT CCA PROX DIAS: 7 CM/S
LEFT CCA PROX SYS: 57 CM/S
LEFT ECA DIAS: 6 CM/S
LEFT ECA SYS: 62 CM/S
LEFT ICA DIST DIAS: 23 CM/S
LEFT ICA DIST SYS: 95 CM/S
LEFT ICA MID DIAS: 16 CM/S
LEFT ICA MID SYS: 72 CM/S
LEFT ICA PROX DIAS: 0 CM/S
LEFT ICA PROX SYS: 21 CM/S
LEFT VERTEBRAL DIAS: 7 CM/S
LEFT VERTEBRAL SYS: 37 CM/S
LYMPHOCYTES # BLD AUTO: 1.75 X10(3)/MCL (ref 0.6–4.6)
LYMPHOCYTES NFR BLD AUTO: 13.7 %
MAGNESIUM SERPL-MCNC: 1.6 MG/DL (ref 1.6–2.6)
MCH RBC QN AUTO: 26.4 PG (ref 27–31)
MCHC RBC AUTO-ENTMCNC: 28.8 MG/DL (ref 33–36)
MCV RBC AUTO: 91.7 FL (ref 80–94)
MONOCYTES # BLD AUTO: 0.9 X10(3)/MCL (ref 0.1–1.3)
MONOCYTES NFR BLD AUTO: 7.1 %
NEUTROPHILS # BLD AUTO: 9.7 X10(3)/MCL (ref 2.1–9.2)
NEUTROPHILS NFR BLD AUTO: 75.7 %
NRBC BLD AUTO-RTO: 0 %
OHS CV CAROTID RIGHT ICA EDV HIGHEST: 17
OHS CV CAROTID ULTRASOUND LEFT ICA/CCA RATIO: 1.53
OHS CV CAROTID ULTRASOUND RIGHT ICA/CCA RATIO: 1.4
OHS CV PV CAROTID LEFT HIGHEST CCA: 62
OHS CV PV CAROTID LEFT HIGHEST ICA: 95
OHS CV PV CAROTID RIGHT HIGHEST CCA: 63
OHS CV PV CAROTID RIGHT HIGHEST ICA: 88
OHS CV US CAROTID LEFT HIGHEST EDV: 23
PCO2 BLDA: 49 MMHG (ref 35–45)
PH SMN: 7.39 [PH] (ref 7.35–7.45)
PHOSPHATE SERPL-MCNC: 3.4 MG/DL (ref 2.3–4.7)
PLATELET # BLD AUTO: 311 X10(3)/MCL (ref 130–400)
PMV BLD AUTO: 10.9 FL (ref 7.4–10.4)
PO2 BLDA: 64 MMHG (ref 80–100)
POC BASE DEFICIT: 4.1 MMOL/L (ref -2–2)
POC COHB: 2 %
POC IONIZED CALCIUM: 1.18 MMOL/L (ref 1.12–1.23)
POC METHB: 0.9 % (ref 0.4–1.5)
POC O2HB: 91.1 % (ref 94–97)
POC SATURATED O2: 91.9 %
POC TEMPERATURE: 37 °C
POTASSIUM BLD-SCNC: 4.1 MMOL/L (ref 3.5–5)
POTASSIUM SERPL-SCNC: 4.8 MMOL/L (ref 3.5–5.1)
PROT SERPL-MCNC: 4.7 GM/DL (ref 5.8–7.6)
RBC # BLD AUTO: 3.48 X10(6)/MCL (ref 4.2–5.4)
RIGHT CCA DIST DIAS: 15 CM/S
RIGHT CCA DIST SYS: 63 CM/S
RIGHT CCA PROX DIAS: 8 CM/S
RIGHT CCA PROX SYS: 53 CM/S
RIGHT ECA SYS: 90 CM/S
RIGHT ICA DIST DIAS: 0 CM/S
RIGHT ICA DIST SYS: 57 CM/S
RIGHT ICA MID DIAS: 17 CM/S
RIGHT ICA MID SYS: 88 CM/S
RIGHT ICA PROX DIAS: 14 CM/S
RIGHT ICA PROX SYS: 53 CM/S
RIGHT VERTEBRAL DIAS: 16 CM/S
RIGHT VERTEBRAL SYS: 85 CM/S
SODIUM BLD-SCNC: 135 MMOL/L (ref 137–145)
SODIUM SERPL-SCNC: 142 MMOL/L (ref 136–145)
SPECIMEN SOURCE: ABNORMAL
TRIGL SERPL-MCNC: 51 MG/DL (ref 37–140)
VLDLC SERPL CALC-MCNC: 10 MG/DL
WBC # SPEC AUTO: 12.8 X10(3)/MCL (ref 4.5–11.5)

## 2022-10-18 PROCEDURE — 80061 LIPID PANEL: CPT | Performed by: INTERNAL MEDICINE

## 2022-10-18 PROCEDURE — A9577 INJ MULTIHANCE: HCPCS | Performed by: INTERNAL MEDICINE

## 2022-10-18 PROCEDURE — 25000003 PHARM REV CODE 250: Performed by: INTERNAL MEDICINE

## 2022-10-18 PROCEDURE — 27000221 HC OXYGEN, UP TO 24 HOURS

## 2022-10-18 PROCEDURE — 85379 FIBRIN DEGRADATION QUANT: CPT | Performed by: INTERNAL MEDICINE

## 2022-10-18 PROCEDURE — 36600 WITHDRAWAL OF ARTERIAL BLOOD: CPT

## 2022-10-18 PROCEDURE — 94761 N-INVAS EAR/PLS OXIMETRY MLT: CPT

## 2022-10-18 PROCEDURE — 80053 COMPREHEN METABOLIC PANEL: CPT | Performed by: PHYSICIAN ASSISTANT

## 2022-10-18 PROCEDURE — 36415 COLL VENOUS BLD VENIPUNCTURE: CPT | Performed by: PHYSICIAN ASSISTANT

## 2022-10-18 PROCEDURE — C1751 CATH, INF, PER/CENT/MIDLINE: HCPCS

## 2022-10-18 PROCEDURE — 97162 PT EVAL MOD COMPLEX 30 MIN: CPT

## 2022-10-18 PROCEDURE — 97166 OT EVAL MOD COMPLEX 45 MIN: CPT

## 2022-10-18 PROCEDURE — 25500020 PHARM REV CODE 255: Performed by: INTERNAL MEDICINE

## 2022-10-18 PROCEDURE — 63600175 PHARM REV CODE 636 W HCPCS: Performed by: INTERNAL MEDICINE

## 2022-10-18 PROCEDURE — 36415 COLL VENOUS BLD VENIPUNCTURE: CPT | Performed by: INTERNAL MEDICINE

## 2022-10-18 PROCEDURE — 25000003 PHARM REV CODE 250: Performed by: NURSE PRACTITIONER

## 2022-10-18 PROCEDURE — 84100 ASSAY OF PHOSPHORUS: CPT | Performed by: NURSE PRACTITIONER

## 2022-10-18 PROCEDURE — 76937 US GUIDE VASCULAR ACCESS: CPT

## 2022-10-18 PROCEDURE — 36410 VNPNXR 3YR/> PHY/QHP DX/THER: CPT

## 2022-10-18 PROCEDURE — 85025 COMPLETE CBC W/AUTO DIFF WBC: CPT | Performed by: PHYSICIAN ASSISTANT

## 2022-10-18 PROCEDURE — 21400001 HC TELEMETRY ROOM

## 2022-10-18 PROCEDURE — S4991 NICOTINE PATCH NONLEGEND: HCPCS | Performed by: INTERNAL MEDICINE

## 2022-10-18 PROCEDURE — 82803 BLOOD GASES ANY COMBINATION: CPT

## 2022-10-18 PROCEDURE — S0030 INJECTION, METRONIDAZOLE: HCPCS | Performed by: INTERNAL MEDICINE

## 2022-10-18 PROCEDURE — 99900035 HC TECH TIME PER 15 MIN (STAT)

## 2022-10-18 PROCEDURE — 83735 ASSAY OF MAGNESIUM: CPT | Performed by: NURSE PRACTITIONER

## 2022-10-18 RX ORDER — HYDROCODONE BITARTRATE AND ACETAMINOPHEN 5; 325 MG/1; MG/1
1 TABLET ORAL ONCE
Status: DISCONTINUED | OUTPATIENT
Start: 2022-10-18 | End: 2022-10-18

## 2022-10-18 RX ORDER — HYDROCODONE BITARTRATE AND ACETAMINOPHEN 5; 325 MG/1; MG/1
1 TABLET ORAL ONCE
Status: COMPLETED | OUTPATIENT
Start: 2022-10-18 | End: 2022-10-18

## 2022-10-18 RX ORDER — ATORVASTATIN CALCIUM 40 MG/1
40 TABLET, FILM COATED ORAL DAILY
Status: DISCONTINUED | OUTPATIENT
Start: 2022-10-19 | End: 2022-10-28 | Stop reason: HOSPADM

## 2022-10-18 RX ORDER — ENOXAPARIN SODIUM 100 MG/ML
40 INJECTION SUBCUTANEOUS EVERY 24 HOURS
Status: DISCONTINUED | OUTPATIENT
Start: 2022-10-18 | End: 2022-10-18

## 2022-10-18 RX ORDER — CYPROHEPTADINE HYDROCHLORIDE 4 MG/1
4 TABLET ORAL 2 TIMES DAILY
Status: DISCONTINUED | OUTPATIENT
Start: 2022-10-18 | End: 2022-10-28 | Stop reason: HOSPADM

## 2022-10-18 RX ORDER — ASPIRIN 325 MG
325 TABLET, DELAYED RELEASE (ENTERIC COATED) ORAL DAILY
Status: DISCONTINUED | OUTPATIENT
Start: 2022-10-19 | End: 2022-10-19

## 2022-10-18 RX ORDER — LEVOFLOXACIN 5 MG/ML
750 INJECTION, SOLUTION INTRAVENOUS
Status: DISCONTINUED | OUTPATIENT
Start: 2022-10-18 | End: 2022-10-21

## 2022-10-18 RX ORDER — SUCRALFATE 1 G/1
1 TABLET ORAL 4 TIMES DAILY
Status: DISCONTINUED | OUTPATIENT
Start: 2022-10-18 | End: 2022-10-28 | Stop reason: HOSPADM

## 2022-10-18 RX ORDER — ARIPIPRAZOLE 5 MG/1
10 TABLET ORAL DAILY
Status: DISCONTINUED | OUTPATIENT
Start: 2022-10-18 | End: 2022-10-28 | Stop reason: HOSPADM

## 2022-10-18 RX ORDER — LABETALOL HYDROCHLORIDE 5 MG/ML
10 INJECTION, SOLUTION INTRAVENOUS
Status: DISCONTINUED | OUTPATIENT
Start: 2022-10-18 | End: 2022-10-28 | Stop reason: HOSPADM

## 2022-10-18 RX ORDER — SODIUM CHLORIDE 0.9 % (FLUSH) 0.9 %
10 SYRINGE (ML) INJECTION EVERY 6 HOURS
Status: DISCONTINUED | OUTPATIENT
Start: 2022-10-19 | End: 2022-10-28 | Stop reason: HOSPADM

## 2022-10-18 RX ORDER — METRONIDAZOLE 500 MG/100ML
500 INJECTION, SOLUTION INTRAVENOUS
Status: DISCONTINUED | OUTPATIENT
Start: 2022-10-18 | End: 2022-10-23

## 2022-10-18 RX ORDER — ALPRAZOLAM 0.25 MG/1
0.25 TABLET ORAL ONCE
Status: COMPLETED | OUTPATIENT
Start: 2022-10-18 | End: 2022-10-18

## 2022-10-18 RX ORDER — NAPROXEN SODIUM 220 MG/1
81 TABLET, FILM COATED ORAL DAILY
Status: DISCONTINUED | OUTPATIENT
Start: 2022-10-18 | End: 2022-10-18

## 2022-10-18 RX ORDER — METOPROLOL SUCCINATE 50 MG/1
50 TABLET, EXTENDED RELEASE ORAL DAILY
Status: DISCONTINUED | OUTPATIENT
Start: 2022-10-18 | End: 2022-10-28 | Stop reason: HOSPADM

## 2022-10-18 RX ORDER — FUROSEMIDE 10 MG/ML
20 INJECTION INTRAMUSCULAR; INTRAVENOUS ONCE
Status: COMPLETED | OUTPATIENT
Start: 2022-10-18 | End: 2022-10-18

## 2022-10-18 RX ORDER — SODIUM CHLORIDE, SODIUM LACTATE, POTASSIUM CHLORIDE, CALCIUM CHLORIDE 600; 310; 30; 20 MG/100ML; MG/100ML; MG/100ML; MG/100ML
INJECTION, SOLUTION INTRAVENOUS CONTINUOUS
Status: DISCONTINUED | OUTPATIENT
Start: 2022-10-18 | End: 2022-10-21

## 2022-10-18 RX ORDER — IPRATROPIUM BROMIDE AND ALBUTEROL SULFATE 2.5; .5 MG/3ML; MG/3ML
3 SOLUTION RESPIRATORY (INHALATION) EVERY 6 HOURS
Status: DISCONTINUED | OUTPATIENT
Start: 2022-10-18 | End: 2022-10-28 | Stop reason: HOSPADM

## 2022-10-18 RX ORDER — FUROSEMIDE 10 MG/ML
20 INJECTION INTRAMUSCULAR; INTRAVENOUS ONCE
Status: DISCONTINUED | OUTPATIENT
Start: 2022-10-18 | End: 2022-10-18

## 2022-10-18 RX ORDER — TRAMADOL HYDROCHLORIDE 50 MG/1
50 TABLET ORAL EVERY 6 HOURS PRN
Status: DISCONTINUED | OUTPATIENT
Start: 2022-10-18 | End: 2022-10-27

## 2022-10-18 RX ORDER — LANOLIN ALCOHOL/MO/W.PET/CERES
1 CREAM (GRAM) TOPICAL DAILY
Status: DISCONTINUED | OUTPATIENT
Start: 2022-10-18 | End: 2022-10-28 | Stop reason: HOSPADM

## 2022-10-18 RX ORDER — SODIUM CHLORIDE 0.9 % (FLUSH) 0.9 %
10 SYRINGE (ML) INJECTION
Status: DISCONTINUED | OUTPATIENT
Start: 2022-10-18 | End: 2022-10-28 | Stop reason: HOSPADM

## 2022-10-18 RX ORDER — PANTOPRAZOLE SODIUM 40 MG/1
40 TABLET, DELAYED RELEASE ORAL 2 TIMES DAILY
Status: DISCONTINUED | OUTPATIENT
Start: 2022-10-18 | End: 2022-10-28 | Stop reason: HOSPADM

## 2022-10-18 RX ORDER — ATORVASTATIN CALCIUM 40 MG/1
40 TABLET, FILM COATED ORAL DAILY
Status: DISCONTINUED | OUTPATIENT
Start: 2022-10-18 | End: 2022-10-18

## 2022-10-18 RX ORDER — IBUPROFEN 200 MG
1 TABLET ORAL DAILY
Status: DISCONTINUED | OUTPATIENT
Start: 2022-10-18 | End: 2022-10-28 | Stop reason: HOSPADM

## 2022-10-18 RX ADMIN — HYDROCODONE BITARTRATE AND ACETAMINOPHEN 1 TABLET: 5; 325 TABLET ORAL at 01:10

## 2022-10-18 RX ADMIN — FUROSEMIDE 20 MG: 10 INJECTION, SOLUTION INTRAMUSCULAR; INTRAVENOUS at 10:10

## 2022-10-18 RX ADMIN — SODIUM CHLORIDE, POTASSIUM CHLORIDE, SODIUM LACTATE AND CALCIUM CHLORIDE: 600; 310; 30; 20 INJECTION, SOLUTION INTRAVENOUS at 04:10

## 2022-10-18 RX ADMIN — ALPRAZOLAM 0.25 MG: 0.25 TABLET ORAL at 05:10

## 2022-10-18 RX ADMIN — CYPROHEPTADINE HYDROCHLORIDE 4 MG: 4 TABLET ORAL at 08:10

## 2022-10-18 RX ADMIN — PANTOPRAZOLE SODIUM 40 MG: 40 TABLET, DELAYED RELEASE ORAL at 09:10

## 2022-10-18 RX ADMIN — HYDROCODONE BITARTRATE AND ACETAMINOPHEN 1 TABLET: 5; 325 TABLET ORAL at 07:10

## 2022-10-18 RX ADMIN — IOPAMIDOL 100 ML: 755 INJECTION, SOLUTION INTRAVENOUS at 02:10

## 2022-10-18 RX ADMIN — ASPIRIN 81 MG CHEWABLE TABLET 81 MG: 81 TABLET CHEWABLE at 08:10

## 2022-10-18 RX ADMIN — THERA TABS 1 TABLET: TAB at 08:10

## 2022-10-18 RX ADMIN — ATORVASTATIN CALCIUM 40 MG: 40 TABLET, FILM COATED ORAL at 08:10

## 2022-10-18 RX ADMIN — GADOBENATE DIMEGLUMINE 9 ML: 529 INJECTION, SOLUTION INTRAVENOUS at 02:10

## 2022-10-18 RX ADMIN — APIXABAN 5 MG: 5 TABLET, FILM COATED ORAL at 09:10

## 2022-10-18 RX ADMIN — ARIPIPRAZOLE 10 MG: 5 TABLET ORAL at 08:10

## 2022-10-18 RX ADMIN — TRAMADOL HYDROCHLORIDE 50 MG: 50 TABLET, COATED ORAL at 04:10

## 2022-10-18 RX ADMIN — FERROUS SULFATE TAB 325 MG (65 MG ELEMENTAL FE) 1 EACH: 325 (65 FE) TAB at 08:10

## 2022-10-18 RX ADMIN — DOXYCYCLINE 100 MG: 100 INJECTION, POWDER, LYOPHILIZED, FOR SOLUTION INTRAVENOUS at 09:10

## 2022-10-18 RX ADMIN — SUCRALFATE 1 G: 1 TABLET ORAL at 09:10

## 2022-10-18 RX ADMIN — SUCRALFATE 1 G: 1 TABLET ORAL at 08:10

## 2022-10-18 RX ADMIN — PANTOPRAZOLE SODIUM 40 MG: 40 TABLET, DELAYED RELEASE ORAL at 08:10

## 2022-10-18 RX ADMIN — SUCRALFATE 1 G: 1 TABLET ORAL at 01:10

## 2022-10-18 RX ADMIN — SODIUM CHLORIDE: 9 INJECTION, SOLUTION INTRAVENOUS at 08:10

## 2022-10-18 RX ADMIN — CYPROHEPTADINE HYDROCHLORIDE 4 MG: 4 TABLET ORAL at 09:10

## 2022-10-18 RX ADMIN — NICOTINE 1 PATCH: 14 PATCH TRANSDERMAL at 01:10

## 2022-10-18 RX ADMIN — METHYLPREDNISOLONE SODIUM SUCCINATE 40 MG: 40 INJECTION, POWDER, FOR SOLUTION INTRAMUSCULAR; INTRAVENOUS at 09:10

## 2022-10-18 RX ADMIN — METRONIDAZOLE 500 MG: 5 INJECTION, SOLUTION INTRAVENOUS at 11:10

## 2022-10-18 RX ADMIN — METOPROLOL SUCCINATE 50 MG: 50 TABLET, FILM COATED, EXTENDED RELEASE ORAL at 08:10

## 2022-10-18 RX ADMIN — METHYLPREDNISOLONE SODIUM SUCCINATE 40 MG: 40 INJECTION, POWDER, FOR SOLUTION INTRAMUSCULAR; INTRAVENOUS at 01:10

## 2022-10-18 NOTE — PLAN OF CARE
Problem: Adult Inpatient Plan of Care  Goal: Plan of Care Review  10/18/2022 1837 by Jovan Ospina RN  Outcome: Ongoing, Progressing  10/18/2022 1245 by Jovan Ospina RN  Outcome: Ongoing, Progressing  Goal: Patient-Specific Goal (Individualized)  10/18/2022 1837 by Jovan Ospina RN  Outcome: Ongoing, Progressing  10/18/2022 1245 by Jovan Ospina RN  Outcome: Ongoing, Progressing  Goal: Absence of Hospital-Acquired Illness or Injury  10/18/2022 1837 by Jovan Ospina RN  Outcome: Ongoing, Progressing  10/18/2022 1245 by Jovan Ospina RN  Outcome: Ongoing, Progressing  Goal: Optimal Comfort and Wellbeing  10/18/2022 1837 by Jovan Ospina RN  Outcome: Ongoing, Progressing  10/18/2022 1245 by Jovan Ospina RN  Outcome: Ongoing, Progressing  Goal: Readiness for Transition of Care  10/18/2022 1837 by Jovan Ospina RN  Outcome: Ongoing, Progressing  10/18/2022 1245 by Jovan Ospina RN  Outcome: Ongoing, Progressing     Problem: Fluid Imbalance (Pneumonia)  Goal: Fluid Balance  10/18/2022 1837 by Jovan Ospina RN  Outcome: Ongoing, Progressing  10/18/2022 1245 by Jovan Ospina RN  Outcome: Ongoing, Progressing     Problem: Infection (Pneumonia)  Goal: Resolution of Infection Signs and Symptoms  10/18/2022 1837 by Jovan Ospina RN  Outcome: Ongoing, Progressing  10/18/2022 1245 by Jovan Ospina RN  Outcome: Ongoing, Progressing     Problem: Respiratory Compromise (Pneumonia)  Goal: Effective Oxygenation and Ventilation  10/18/2022 1837 by Jovan Ospina RN  Outcome: Ongoing, Progressing  10/18/2022 1245 by Jovan Ospina RN  Outcome: Ongoing, Progressing     Problem: Infection  Goal: Absence of Infection Signs and Symptoms  10/18/2022 1837 by Jovan Ospina RN  Outcome: Ongoing, Progressing  10/18/2022 1245 by Jovan Ospina RN  Outcome: Ongoing, Progressing     Problem: Impaired Wound Healing  Goal: Optimal Wound Healing  10/18/2022 1837 by Jovan Ospina RN  Outcome: Ongoing,  Progressing  10/18/2022 1245 by Jovan Ospina RN  Outcome: Ongoing, Progressing     Problem: Fall Injury Risk  Goal: Absence of Fall and Fall-Related Injury  10/18/2022 1837 by Jovan Ospina RN  Outcome: Ongoing, Progressing  10/18/2022 1245 by Jovan Ospina RN  Outcome: Ongoing, Progressing     Problem: Skin Injury Risk Increased  Goal: Skin Health and Integrity  10/18/2022 1837 by Jovan Ospina RN  Outcome: Ongoing, Progressing  10/18/2022 1245 by Jovan Ospina RN  Outcome: Ongoing, Progressing     Problem: Adjustment to Illness (Stroke, Ischemic/Transient Ischemic Attack)  Goal: Optimal Coping  Outcome: Ongoing, Progressing     Problem: Bowel Elimination Impaired (Stroke, Ischemic/Transient Ischemic Attack)  Goal: Effective Bowel Elimination  Outcome: Ongoing, Progressing     Problem: Cerebral Tissue Perfusion (Stroke, Ischemic/Transient Ischemic Attack)  Goal: Optimal Cerebral Tissue Perfusion  Outcome: Ongoing, Progressing     Problem: Cognitive Impairment (Stroke, Ischemic/Transient Ischemic Attack)  Goal: Optimal Cognitive Function  Outcome: Ongoing, Progressing     Problem: Communication Impairment (Stroke, Ischemic/Transient Ischemic Attack)  Goal: Improved Communication Skills  Outcome: Ongoing, Progressing     Problem: Functional Ability Impaired (Stroke, Ischemic/Transient Ischemic Attack)  Goal: Optimal Functional Ability  Outcome: Ongoing, Progressing     Problem: Respiratory Compromise (Stroke, Ischemic/Transient Ischemic Attack)  Goal: Effective Oxygenation and Ventilation  Outcome: Ongoing, Progressing     Problem: Sensorimotor Impairment (Stroke, Ischemic/Transient Ischemic Attack)  Goal: Improved Sensorimotor Function  Outcome: Ongoing, Progressing     Problem: Swallowing Impairment (Stroke, Ischemic/Transient Ischemic Attack)  Goal: Optimal Eating and Swallowing without Aspiration  Outcome: Ongoing, Progressing     Problem: Urinary Elimination Impaired (Stroke, Ischemic/Transient  Ischemic Attack)  Goal: Effective Urinary Elimination  Outcome: Ongoing, Progressing

## 2022-10-18 NOTE — PLAN OF CARE
10/18/22 1511   Discharge Assessment   Assessment Type Discharge Planning Assessment   Confirmed/corrected address, phone number and insurance Yes   Confirmed Demographics Correct on Facesheet   Source of Information family   When was your last doctors appointment?   (Patient's spouse reports 1 month ago.)   Communicated BRITNEY with patient/caregiver Yes   Reason For Admission Syncope   Lives With spouse;other relative(s)   Do you expect to return to your current living situation? Yes   Do you have help at home or someone to help you manage your care at home? Yes   Who are your caregiver(s) and their phone number(s)? Spouse: Levi Dill: [phone]   Prior to hospitilization cognitive status: Unable to Assess   Current cognitive status: Unable to Assess   Walking or Climbing Stairs Difficulty ambulation difficulty, requires equipment   Dressing/Bathing Difficulty bathing difficulty, dependent   Home Accessibility wheelchair accessible  (Family with ramp at home.)   Home Layout Able to live on 1st floor   Equipment Currently Used at Home walker, rolling   Readmission within 30 days? Yes   Patient currently being followed by outpatient case management? No   Do you currently have service(s) that help you manage your care at home? Yes   Name and Contact number of agency [facility]   Is the pt/caregiver preference to resume services with current agency Yes   Do you take prescription medications? Yes   Do you have prescription coverage? Yes   Coverage Medicare Part A & B   Do you have any problems affording any of your prescribed medications? No   Is the patient taking medications as prescribed? yes   Who is going to help you get home at discharge? Patient's spouse or family member.   How do you get to doctors appointments? health plan transportation   Are you on dialysis? No   Do you take coumadin?   (Unsure.)   Discharge Plan A Rehab   Discharge Plan B Home with family;Home Health   DME Needed Upon  Discharge  none   Discharge Plan discussed with: Spouse/sig other   Name(s) and Number(s) Spouse: Levi Dill: [phone]   Discharge Barriers Identified None   Relationship/Environment   Name(s) of Who Lives With Patient Patient resides with her spouse. Pt's family member from [facility] will be caring for pt as needed.     Patient was established with [facility] prior to hospital stay.  Pt's spouse would like rehab services is the [facility], [facility].

## 2022-10-18 NOTE — PT/OT/SLP EVAL
Physical Therapy Evaluation    Patient Name:  Nimo Dill   MRN:  [id number]    Recommendations:     Discharge Recommendations:  rehabilitation facility   Discharge Equipment Recommendations: walker, rolling   Barriers to discharge: None    Assessment:     Nimo Dill is a 66 y.o. female admitted with a medical diagnosis of syncope and frequent falls.  She presents with the following impairments/functional limitations:  weakness, gait instability, decreased lower extremity function, impaired balance, impaired endurance, impaired functional mobility.    Rehab Prognosis: Good; patient would benefit from acute skilled PT services to address these deficits and reach maximum level of function.    Recent Surgery: * No surgery found *      Plan:     During this hospitalization, patient to be seen 5 x/week to address the identified rehab impairments via gait training, therapeutic activities, therapeutic exercises and progress toward the following goals:    Plan of Care Expires:  11/18/22    Subjective     Chief Complaint: weakness  Patient/Family Comments/goals: to return to PLOF   Pain/Comfort:       Patients cultural, spiritual, Church conflicts given the current situation:      Living Environment:  Home in  home with  and brother in law. Ramp to enter.   Prior to admission, patients level of function was independent with rollator .  Equipment used at home: rollator.  DME owned (not currently used):  rollator .  Upon discharge, patient will have assistance from brother in law and .    Objective:     Communicated with NSG prior to session.  Patient found supine with oxygen  upon PT entry to room.    General Precautions: Standard, fall   Orthopedic Precautions:N/A   Braces: N/A  Respiratory Status: Nasal cannula, flow 2 L/min    Exams:  RLE ROM: WFL  RLE Strength: WFL  LLE ROM: WFL  LLE Strength: WFL    Functional Mobility:  Bed Mobility:     Supine to Sit: minimum assistance  Sit to Supine:  minimum assistance  Transfers:     Sit to Stand:  minimum assistance with rolling walker  Gait: 3 feet forward and back with RW and min A.      Patient left supine with all lines intact, call button in reach, and OT present.    GOALS:   Multidisciplinary Problems       Physical Therapy Goals          Problem: Physical Therapy    Goal Priority Disciplines Outcome Goal Variances Interventions   Physical Therapy Goal     PT, PT/OT Ongoing, Progressing     Description: Goals to be met by: 22     Patient will increase functional independence with mobility by performin. Supine to sit with Modified [place]  2. Sit to stand transfer with Modified [place]  3. Gait  x 200 feet with Modified [place] using Rolling Walker.                          History:     Past Medical History:   Diagnosis Date    Common bile duct dilatation     Epigastric abdominal pain     Gastric ulcer, acute with hemorrhage and perforation     PUD (peptic ulcer disease)        Past Surgical History:   Procedure Laterality Date    APPENDECTOMY       SECTION, CLASSIC      CHOLECYSTECTOMY      Distal gastrectomy      ESOPHAGOGASTRODUODENOSCOPY N/A 10/10/2022    Procedure: EGD;  Surgeon: Nati Smith MD;  Location: [facility] ENDOSCOPY;  Service: Gastroenterology;  Laterality: N/A;    GASTRECTOMY      HYSTERECTOMY      Rectovaginal fistula repair      TONSILLECTOMY      VAGOTOMY AND PYLOROPLASTY         Time Tracking:     PT Received On: 10/18/22  PT Start Time: 1023     PT Stop Time: 1045  PT Total Time (min): 22 min     Billable Minutes: Evaluation 22      10/18/2022

## 2022-10-18 NOTE — H&P
[facility] Hospital Medicine   History & Physical Note      Patient Name: Nimo Dill  : 1955  MRN: [id number]  PCP: EILEEN Figueroa  Admitting Service: Hospital Medicine  Attending Physician: Timothy Shipley MD  Admission Date: 10/17/2022 - IP- Inpatient   Length of Stay: 0  History source: EMR, patient and/or patient's family  Face-to-Face encounter date: 10/17/2022  Code status: Full    Chief Complaint   syncope      History of Present Illness   Mrs. Dill is a 66-year-old female with recurrent syncope/fall/gait ataxia, bilateral cerebellar infarcts likely embolic on Xarelto/ASA awaiting LINQ placement, recent UGIB presents to the ED for syncope and collapse just prior to arrival.  She does report posterior scalp pain and does have a a hematoma noted.  Patient states she was walking and the next thing she remembers she was on the ground.     She has a history of recurrent falls/syncope, was just discharged from our service on 10/11 after being admitted for recurrent syncope/falls/gait ataxia and her workup showed UTI, CAP, old and new bilateral cerebellar infarcts likely embolic/ She was started on Eliquis and aspirin awaiting LINQ placement with Cardiology.  She was also anemic and required 2 units packed red blood cells, she was noted to have guaiac-positive stools and was seen by GI and underwent EGD on 10/10 that showed some ulceration at the site of gastrectomy anastamosis but no active bleeding.  She was getting physical therapy during her hospitalization and skilled placement was recommended but patient opted for home health.       Initial ED VS: Temperature 98.0°, heart rate 77, blood pressure 114/55, oxygenation 97% RA.  EKG; NSR with T wave abnormality. Labs remarkable for an albumin of 1.8, WBC 74952, hemoglobin 9.9, hematocrit 33 (hemoglobin at time of discharge on 10/11 was 10.9).  She is requesting sniff placement due to recurrent falls therefore  admission to the hospitalist service.     ROS   Except as documented, all other systems reviewed and negative     Past Medical History   Tobacco Use  Recurrent Falls and Syncopal Events  CVAs (cerebellar/right centrum semi ovale)  CAP in Oct 2022 requiring hospitalization  E. Coli UTI  PUD with gastric ulcer perforation  Upper GI bleed in 2022 secondary to ulceration at the site of the gastrectomy anastomosis      Past Surgical History     Past Surgical History:   Procedure Laterality Date    APPENDECTOMY       SECTION, CLASSIC      CHOLECYSTECTOMY      Distal gastrectomy      ESOPHAGOGASTRODUODENOSCOPY N/A 10/10/2022    Procedure: EGD;  Surgeon: Nati Smith MD;  Location: [facility] ENDOSCOPY;  Service: Gastroenterology;  Laterality: N/A;    GASTRECTOMY      HYSTERECTOMY      Rectovaginal fistula repair      TONSILLECTOMY      VAGOTOMY AND PYLOROPLASTY         Social History     Social History     Tobacco Use    Smoking status: Every Day     Packs/day: 1.00     Years: 40.00     Pack years: 40.00     Types: Cigarettes    Smokeless tobacco: Never   Substance Use Topics    Alcohol use: No        Family History   Reviewed and negative    Allergies   Penicillins    Home Medications     Prior to Admission medications    Medication Sig   alprazolam (XANAX) 0.5 MG tablet Take 0.5 mg by mouth 3 (three) times daily as needed.    apixaban (ELIQUIS) 5 mg Tab Take 1 tablet (5 mg total) by mouth 2 (two) times daily.   ARIPiprazole (ABILIFY) 10 MG Tab Take 10 mg by mouth once daily.   aspirin 81 MG Chew Take 1 tablet (81 mg total) by mouth once daily.   atorvastatin (LIPITOR) 40 MG tablet Take 1 tablet (40 mg total) by mouth once daily.   cyproheptadine (PERIACTIN) 4 mg tablet    ferrous sulfate 325 (65 FE) MG EC tablet Take 1 tablet (325 mg total) by mouth once daily.   HYDROcodone-acetaminophen (NORCO) 5-325 mg per tablet Take 1 tablet by mouth every 6 (six) hours as needed.   ketoconazole (NIZORAL) 2 % cream  Apply topically once daily. for 7 days   metoprolol succinate (TOPROL-XL) 50 MG 24 hr tablet Take 1 tablet (50 mg total) by mouth once daily.   multivitamin Tab Take 1 tablet by mouth once daily.   pantoprazole (PROTONIX) 40 MG tablet Take 1 tablet (40 mg total) by mouth 2 (two) times daily.   paroxetine (PAXIL) 40 MG tablet Take 40 mg by mouth once daily.   sucralfate (CARAFATE) 1 gram tablet Take 1 tablet (1 g total) by mouth 4 (four) times daily.   zolpidem (AMBIEN) 10 mg Tab Take 10 mg by mouth nightly as needed.        Inpatient Medications   Scheduled Meds   fentaNYL        ondansetron         Continuous Infusions    PRN Meds  acetaminophen, acetaminophen, dextrose 50%, dextrose 50%, glucagon (human recombinant), glucose, glucose, ondansetron    Physical Exam   Vital Signs  Temp:  [98.1 °F (36.7 °C)]   Pulse:  [69-80]   Resp:  [20-23]   BP: (105-136)/(58-71)   SpO2:  [87 %-100 %]    General: Appears comfortable  HEENT: NC/AT, posterior scalp hematoma  Neck:  No JVD  Chest: CTABL  CVS: regular rhythm. Normal S1/S2.  Abdomen: nondistended, normoactive BS, soft and non-tender.  MSK: No obvious deformity or joint swelling  Skin: Warm and dry  Neuro: AAOx3, no focal neurological deficit  Psych: Cooperative    Labs     Recent Labs     10/17/22  1308   WBC 14.8*   RBC 3.69*   HGB 9.9*   HCT 33.0*   MCV 89.4   MCH 26.8*   MCHC 30.0*   RDW 17.5*   *     Recent Labs     10/17/22  1357   LACTIC 1.9     Recent Labs     10/17/22  1308   INR 1.02     No results for input(s): HGBA1C, CHOL, TRIG, LDL, VLDL, HDL in the last 72 hours.   Recent Labs     10/17/22  1357      K 4.3   CHLORIDE 106   CO2 27   BUN 16.4   CREATININE 0.76   GLUCOSE 95   CALCIUM 8.5   MG 1.60   ALBUMIN 1.8*   GLOBULIN 3.2   ALKPHOS 107   ALT 16   AST 20   BILITOT 0.2   LIPASE 10   TSH 4.8217     Recent Labs     10/17/22  1308 10/17/22  1357   .7*  --    TROPONINI  --  0.037          Microbiology Results (last 7 days)       ** No  results found for the last 168 hours. **           Imaging     CT Cervical Spine Without Contrast   Final Result      Loss of the normal lordotic curve of the cervical spine most likely related to spasm but otherwise unremarkable with no evidence of acute fracture or dislocation seen         Electronically signed by: Cheikh French   Date:    10/17/2022   Time:    13:31      CT Head Without Contrast   Final Result      Cephalhematoma and soft tissues swelling seen in the scalp in the posterior parietal region         Electronically signed by: Cheikh French   Date:    10/17/2022   Time:    13:27      X-Ray Pelvis Routine AP   Final Result      Degenerative changes         Electronically signed by: Seth Orellana   Date:    10/17/2022   Time:    14:29      X-Ray Chest AP Portable   Final Result      No acute findings.  Stable chest radiograph.         Electronically signed by: Link Crane   Date:    10/17/2022   Time:    13:16        Assessment & Plan   Recurrent Syncope/Falls/Gait Ataxia  Chronic Cerebellar Infarcts, concerning for embolic phenomenon on Eliquis/ASA, awaiting LINQ placement  Posterior Scalp hematoma s/p fall  Microcytic Anemia; Recent UGIB  Hypoalbuminemia  Neutrophilic leukocytosis, likely reactive    Plan:  - Check orthostatics  - Echo on 10/07/22: LVEF 55-60%, normal systolic and diastolic function, moderate mitral regurg   - recurrent syncopal events likely due to arrhythmia, awaiting LINQ placement  - consult PT OT.  - Consult CM for skilled nursing facility  -  HOLD Eliquis, may need to reconsider restarting Eliquis given recurrent falls.  - home meds reviewed and resumed  - VTE Prophylaxis: Dorothy Luis, FNP-C have discussed this patients case with Dr. Dailey who agrees with the diagnosis and treatment plan.      __________________________________________________________________________  I, Dr. Kvng Dailey assumed care of this patient.  For the patient  encounter, I performed the substantive portion of the visit, I reviewed the NPPA documentation, treatment plan, and medical decision making.  I had face to face time with this patient.  I have personally reviewed the labs and test results that are presently available. I have reviewed or attempted to review medical records based upon their availability. If patient was admitted under observational status it is with my approval.      66-year-old female with history of multiple cerebellar infarcts concerning for embolic phenomenon now on aspirin awaiting LINQ placement, and recurrent syncopal episodes presents again with syncope and fall with a posterior scalp hematoma but no intracranial bleeding.  Currently without focal neurologic deficit, appears weak, malnourished, multiple bruising noted on extremities.  Check urine drug screen, physical therapy eval, cardiology consultation, repeat urinalysis and treat UTI present.    Time seen: 11PM   Kvng Dailey MD

## 2022-10-18 NOTE — PROGRESS NOTES
[facility]  Hospital Medicine Progress Note        Chief Complaint: Inpatient Follow-up    HPI:   66-year-old female with significant history of recurrent syncope/fall/ataxic gait.  Diagnosed with the cerebellar infarcts likely embolic on aspirin, Eliquis.  Patient awaiting LINQ placement.  She was admitted to our service and was discharged on 10/11 after being diagnosed with CVA.  Patient admitted hospitalist medicine service on 10/17 again with recurrent syncopal/collapse episodes.  During previous hospitalization patient was significantly anemic, she underwent GI evaluation with EGD showing some ulceration at gastrectomy anastomotic site with no active bleeding.  During previous hospitalization therapy services had recommended skilled nursing facility placement, but the patient refused and went home.    Interval Hx:   Patient seen at bedside.  Appears anxious.  No new complaints.  No new neurological symptoms.  Awake, alert and oriented.  Patient had worsening shortness of breath today morning, improved with supplemental oxygen.  No chest pain, palpitations.  BP accelerated.  Otherwise hemodynamics are stable.    Objective/physical exam:  General: In no acute distress, afebrile  Chest:  Few scattered wheezes   Heart: S1, S2, no appreciable murmur  Abdomen: Soft, nontender, BS +  MSK: Warm, no lower extremity edema, no clubbing or cyanosis  Neurologic: Alert and oriented x4,     VITAL SIGNS: 24 HRS MIN & MAX LAST   Temp  Min: 97.5 °F (36.4 °C)  Max: 98.1 °F (36.7 °C) 97.5 °F (36.4 °C)   BP  Min: 105/58  Max: 169/85 122/73   Pulse  Min: 69  Max: 85  81   Resp  Min: 18  Max: 23 18   SpO2  Min: 87 %  Max: 100 % 96 %       Recent Labs   Lab 10/18/22  0505   WBC 12.8*   RBC 3.48*   HGB 9.2*   HCT 31.9*   MCV 91.7   MCH 26.4*   MCHC 28.8*   RDW 17.8*      MPV 10.9*         Recent Labs   Lab 10/18/22  0505 10/18/22  0917     --    K 4.8  --    CO2 26  --    BUN 15.5  --     CREATININE 0.75  --    CALCIUM 8.4  --    PH  --  7.39   MG 1.60  --    ALBUMIN 1.7*  --    ALKPHOS 103  --    ALT 16  --    AST 20  --    BILITOT 0.2  --           Microbiology Results (last 7 days)       ** No results found for the last 168 hours. **             Scheduled Med:   ARIPiprazole  10 mg Oral Daily    aspirin  81 mg Oral Daily    atorvastatin  40 mg Oral Daily    cyproheptadine  4 mg Oral BID    ferrous sulfate  1 tablet Oral Daily    metoprolol succinate  50 mg Oral Daily    multivitamin  1 tablet Oral Daily    pantoprazole  40 mg Oral BID    sucralfate  1 g Oral QID          Assessment/Plan:    Recurrent syncope/collapse  Subacute CVA-bilateral cerebral/cerebellar  Abnormal gait  Right-sided pneumonia-rule out aspiration  Pulmonary cachexia  COPD with acute exacerbation   Acute on chronic hypoxemic/hypercapnic respiratory failure  Essential HTN-accelerated   HLD   Chronic anemia  Gastric ulcer  Chronic tobacco use  Prophylaxis    MRI brain was repeated-no new stroke   Confirms evolving bilateral cerebral/cerebellar CVAs  Resumed home aspirin, Eliquis   Will have to be very cautious with Eliquis given recurrent falls   I consulted Neurology given her recurrent syncope/collapse  Repeat echocardiogram with bubble study, no bubble study obtained during previous echo  Follow-up ultrasound carotid  Right-sided pneumonia noted, concern for aspiration   Keep NPO except meds, speech consulted  Ringer lactate at 50 cc/hour  Patient allergic to penicillin and therefore initiated Levaquin, Flagyl and doxycycline  Patient reports unintentional weight loss, CT chest, abdomen/pelvis with no occult malignancy  Concern for COPD exacerbation  ABG with mild hypercapnia  PH is less than 7.4, but patient is awake, alert and oriented   No indication for BiPAP   Supplemental oxygen ordered   D-dimer stat   Solu-Medrol, bronchodilators for COPD exacerbation  Hemoglobin stable, no overt bleeding   Continue PPI, Carafate for  history of gastric anastomotic ulcer   Resumed other home meds-aripiprazole, statin, iron, Toprol-XL, multivitamin   Nicotine patch   DVT prophylaxis-on Susan Carlos MD   10/18/2022

## 2022-10-18 NOTE — PLAN OF CARE
Sw met with pt's spouse regarding PT/OT recommendations.Pt's spouse reported no preference but would like rehab in [place]. JATINDER sent referral to [facility] In[facility] Re[facility] Phone: [phone] via [employer].

## 2022-10-18 NOTE — PT/OT/SLP EVAL
Occupational Therapy   Evaluation    Name: Nimo Dill  MRN: [id number]  Admitting Diagnosis:  syncope/fall/gait ataxia  Recent Surgery: * No surgery found *      Recommendations:     Discharge Recommendations: rehabilitation facility  Discharge Equipment Recommendations:   (shower chair vs ETTB)  Barriers to discharge:   (severity of injuries)    Assessment:     Nimo Dill is a 66 y.o. female with a medical diagnosis of recurrent syncope/fall/gait ataxia. Pt with hx of B cerebellar infarcts. Pt and  report pt using rollator for functional mobil for ~3 weeks since last hospital admit and prior to that used rollator as needed during the night. Pt also reports being incontinent of bowel and bladder for ~1 year 2/2 pt unable to tell when she needs to void.  reports dx of B cerebellar infarcts diagnosed ~3 weeks ago post CT scan revealing chronic infarcts. Performance deficits affecting function: weakness, impaired endurance, impaired self care skills, impaired coordination, gait instability, decreased safety awareness, impaired functional mobility, impaired balance.      Rehab Prognosis: Good; patient would benefit from acute skilled OT services to address these deficits and reach maximum level of function.       Plan:     Patient to be seen 5 x/week, daily to address the above listed problems via self-care/home management, therapeutic activities, therapeutic exercises  Plan of Care Expires: 11/01/22  Plan of Care Reviewed with: patient, spouse    Subjective     Chief Complaint: generalized weakness   Patient/Family Comments/goals: Return to PLOF of Mod I - IND    Occupational Profile:  Living Environment: Lives with  in trailer with ramp to enter & tub/shower combo.  Previous level of function: Mod I - IND with ADLs/IADLs  Equipment Used at Home:  rollator  Assistance upon Discharge: Pt's  and brother-in-law will be able to assist pt upon d/c.    Pain/Comfort:  Pain Rating 1:  0/10    Patients cultural, spiritual, Denominational conflicts given the current situation: no    Objective:     Communicated with: PT prior to session.  Patient found sitting edge of bed with PT with telemetry, oxygen upon OT entry to room.    General Precautions: Standard, fall   Orthopedic Precautions:N/A   Braces: N/A  Respiratory Status: Nasal cannula, flow 2 L/min    Occupational Performance:    Bed Mobility:    Patient completed Supine to Sit with stand by assistance  Patient completed Sit to Supine with stand by assistance    Functional Mobility/Transfers:  Patient completed Sit <> Stand Transfer with minimum assistance  with  rolling walker   Functional Mobility: Pt ambulated ~ 3 ft forwards/backwards with RW with Min A- Mod A for balance.    Activities of Daily Living:  Upper Body Dressing: minimum assistance for dynamic sitting balance 2/2 posterior lean.  Lower Body Dressing: moderate assistance to don pants. In seated position pt required min A to maintain sitting balance due to post lean to thread B feet through pants leg, and A with threading LLE through pants leg  2/2 poor activity tolerance. Pt required CGA-Min A for static standing balance to don pants over hips.    Cognitive/Visual Perceptual:  Cognitive/Psychosocial Skills:     -       Follows Commands/attention:Follows multistep  commands  -       Safety awareness/insight to disability: impaired. Pt aware of post lean/LOB while seated, but unable to self-correct without v/c's.  -       Mood/Affect/Coping skills/emotional control: Initially pt pleasant, however with increased fatigue at end of tx session pt with increased frustration and agitation.    Physical Exam:  Pt presents with impaired trunk control requiring Min A for dynamic sitting balance while performing ADLs with post lean/LOB. Pt required CGA-Min A for static standing balance during clothing mgt.    BUE AROM WNL.  BUE FMC WFL  BUE GMC impaired  - dysdiadokinesia      Treatment &  Education:  Discussed OT POC and goals as well as possible inpatient rehab placement upon d/c.    Patient left supine with all lines intact and call button in reach    GOALS:   Multidisciplinary Problems       Occupational Therapy Goals          Problem: Occupational Therapy    Goal Priority Disciplines Outcome Interventions   Occupational Therapy Goal     OT, PT/OT Ongoing, Progressing    Description: Goals to be met by: 22     Patient will increase functional independence with ADLs by performing:    UE Dressing with [place].  LE Dressing with Stand-by Assistance with RW.  Grooming while standing at sink with Stand-by Assistance with RW.  Toileting from bedside commode with Stand-by Assistance for hygiene and clothing management with RW.   Bathing from  shower chair/bench with Supervision.  Supine to sit with [place].  Toilet transfer to bedside commode with Stand-by Assistance with RW.                         History:     Past Medical History:   Diagnosis Date    Common bile duct dilatation     Epigastric abdominal pain     Gastric ulcer, acute with hemorrhage and perforation     PUD (peptic ulcer disease)          Past Surgical History:   Procedure Laterality Date    APPENDECTOMY       SECTION, CLASSIC      CHOLECYSTECTOMY      Distal gastrectomy      ESOPHAGOGASTRODUODENOSCOPY N/A 10/10/2022    Procedure: EGD;  Surgeon: Nati Smith MD;  Location: [facility] ENDOSCOPY;  Service: Gastroenterology;  Laterality: N/A;    GASTRECTOMY      HYSTERECTOMY      Rectovaginal fistula repair      TONSILLECTOMY      VAGOTOMY AND PYLOROPLASTY         Time Tracking:     OT Date of Treatment:    OT Start Time: 1031  OT Stop Time: 1058  OT Total Time (min): 27 min    Billable Minutes:Evaluation 27 min. Moderate complexity    10/18/2022

## 2022-10-18 NOTE — PLAN OF CARE
Problem: Adult Inpatient Plan of Care  Goal: Plan of Care Review  Outcome: Ongoing, Progressing  Goal: Patient-Specific Goal (Individualized)  Outcome: Ongoing, Progressing  Goal: Absence of Hospital-Acquired Illness or Injury  Outcome: Ongoing, Progressing  Goal: Optimal Comfort and Wellbeing  Outcome: Ongoing, Progressing  Goal: Readiness for Transition of Care  Outcome: Ongoing, Progressing     Problem: Fluid Imbalance (Pneumonia)  Goal: Fluid Balance  Outcome: Ongoing, Progressing     Problem: Infection (Pneumonia)  Goal: Resolution of Infection Signs and Symptoms  Outcome: Ongoing, Progressing     Problem: Respiratory Compromise (Pneumonia)  Goal: Effective Oxygenation and Ventilation  Outcome: Ongoing, Progressing     Problem: Infection  Goal: Absence of Infection Signs and Symptoms  Outcome: Ongoing, Progressing     Problem: Impaired Wound Healing  Goal: Optimal Wound Healing  Outcome: Ongoing, Progressing     Problem: Fall Injury Risk  Goal: Absence of Fall and Fall-Related Injury  Outcome: Ongoing, Progressing     Problem: Skin Injury Risk Increased  Goal: Skin Health and Integrity  Outcome: Ongoing, Progressing

## 2022-10-18 NOTE — PLAN OF CARE
Problem: Physical Therapy  Goal: Physical Therapy Goal  Description: Goals to be met by: 22     Patient will increase functional independence with mobility by performin. Supine to sit with Modified [place]  2. Sit to stand transfer with Modified [place]  3. Gait  x 200 feet with Modified [place] using Rolling Walker.     Outcome: Ongoing, Progressing

## 2022-10-18 NOTE — PLAN OF CARE
Problem: Occupational Therapy  Goal: Occupational Therapy Goal  Description: Goals to be met by: 11/01/22     Patient will increase functional independence with ADLs by performing:    UE Dressing with [place].  LE Dressing with Stand-by Assistance with RW.  Grooming while standing at sink with Stand-by Assistance with RW.  Toileting from bedside commode with Stand-by Assistance for hygiene and clothing management with RW.   Bathing from  shower chair/bench with Supervision.  Supine to sit with [place].  Toilet transfer to bedside commode with Stand-by Assistance with RW.    10/18/2022 1335 by Nikki Barrera OT  Outcome: Ongoing, Progressing  10/18/2022 1329 by Nikki Barrera, SHARIF  Outcome: Ongoing, Progressing

## 2022-10-18 NOTE — PLAN OF CARE
Problem: Adult Inpatient Plan of Care  Goal: Plan of Care Review  Outcome: Ongoing, Progressing  Goal: Patient-Specific Goal (Individualized)  Outcome: Ongoing, Progressing  Goal: Absence of Hospital-Acquired Illness or Injury  Outcome: Ongoing, Progressing  Goal: Optimal Comfort and Wellbeing  Outcome: Ongoing, Progressing  Goal: Readiness for Transition of Care  Outcome: Ongoing, Progressing     Problem: Fluid Imbalance (Pneumonia)  Goal: Fluid Balance  Outcome: Ongoing, Progressing     Problem: Infection (Pneumonia)  Goal: Resolution of Infection Signs and Symptoms  Outcome: Ongoing, Progressing     Problem: Respiratory Compromise (Pneumonia)  Goal: Effective Oxygenation and Ventilation  Outcome: Ongoing, Progressing     Problem: Infection  Goal: Absence of Infection Signs and Symptoms  Outcome: Ongoing, Progressing     Problem: Impaired Wound Healing  Goal: Optimal Wound Healing  Outcome: Ongoing, Progressing

## 2022-10-18 NOTE — NURSING
"PT took off foam dressing. Stated "she doesn't want any dressings or tape on her because her skin is so frail and tears easy."   "

## 2022-10-19 LAB
ALBUMIN SERPL-MCNC: 1.7 GM/DL (ref 3.4–4.8)
ALBUMIN/GLOB SERPL: 0.5 RATIO (ref 1.1–2)
ALP SERPL-CCNC: 94 UNIT/L (ref 40–150)
ALT SERPL-CCNC: 17 UNIT/L (ref 0–55)
APTT PPP: 20.4 SECONDS (ref 23.2–33.7)
AST SERPL-CCNC: 18 UNIT/L (ref 5–34)
BASOPHILS # BLD AUTO: 0.03 X10(3)/MCL (ref 0–0.2)
BASOPHILS NFR BLD AUTO: 0.2 %
BILIRUBIN DIRECT+TOT PNL SERPL-MCNC: 0.2 MG/DL
BSA FOR ECHO PROCEDURE: 1.43 M2
BUN SERPL-MCNC: 15.6 MG/DL (ref 9.8–20.1)
CALCIUM SERPL-MCNC: 8.4 MG/DL (ref 8.4–10.2)
CHLORIDE SERPL-SCNC: 101 MMOL/L (ref 98–107)
CK MB SERPL-MCNC: 1.7 NG/ML
CO2 SERPL-SCNC: 26 MMOL/L (ref 23–31)
CREAT SERPL-MCNC: 0.63 MG/DL (ref 0.55–1.02)
EJECTION FRACTION: 55 %
EOSINOPHIL # BLD AUTO: 0 X10(3)/MCL (ref 0–0.9)
EOSINOPHIL NFR BLD AUTO: 0 %
ERYTHROCYTE [DISTWIDTH] IN BLOOD BY AUTOMATED COUNT: 17.2 % (ref 11.5–17)
GFR SERPLBLD CREATININE-BSD FMLA CKD-EPI: >60 MLS/MIN/1.73/M2
GLOBULIN SER-MCNC: 3.2 GM/DL (ref 2.4–3.5)
GLUCOSE SERPL-MCNC: 118 MG/DL (ref 82–115)
HCT VFR BLD AUTO: 29.6 % (ref 37–47)
HGB BLD-MCNC: 8.8 GM/DL (ref 12–16)
IMM GRANULOCYTES # BLD AUTO: 0.14 X10(3)/MCL (ref 0–0.04)
IMM GRANULOCYTES NFR BLD AUTO: 1.1 %
INR BLD: 0.98 (ref 0–1.3)
LYMPHOCYTES # BLD AUTO: 0.74 X10(3)/MCL (ref 0.6–4.6)
LYMPHOCYTES NFR BLD AUTO: 5.8 %
MAGNESIUM SERPL-MCNC: 1.5 MG/DL (ref 1.6–2.6)
MCH RBC QN AUTO: 26.4 PG (ref 27–31)
MCHC RBC AUTO-ENTMCNC: 29.7 MG/DL (ref 33–36)
MCV RBC AUTO: 88.9 FL (ref 80–94)
MONOCYTES # BLD AUTO: 0.15 X10(3)/MCL (ref 0.1–1.3)
MONOCYTES NFR BLD AUTO: 1.2 %
NEUTROPHILS # BLD AUTO: 11.7 X10(3)/MCL (ref 2.1–9.2)
NEUTROPHILS NFR BLD AUTO: 91.7 %
NRBC BLD AUTO-RTO: 0 %
PHOSPHATE SERPL-MCNC: 3.1 MG/DL (ref 2.3–4.7)
PLATELET # BLD AUTO: 338 X10(3)/MCL (ref 130–400)
PMV BLD AUTO: 10.5 FL (ref 7.4–10.4)
POTASSIUM SERPL-SCNC: 4.4 MMOL/L (ref 3.5–5.1)
PROT SERPL-MCNC: 4.9 GM/DL (ref 5.8–7.6)
PROTHROMBIN TIME: 12.9 SECONDS (ref 12.5–14.5)
RBC # BLD AUTO: 3.33 X10(6)/MCL (ref 4.2–5.4)
SODIUM SERPL-SCNC: 135 MMOL/L (ref 136–145)
TROPONIN I SERPL-MCNC: 0.02 NG/ML (ref 0–0.04)
WBC # SPEC AUTO: 12.7 X10(3)/MCL (ref 4.5–11.5)

## 2022-10-19 PROCEDURE — 25000003 PHARM REV CODE 250: Performed by: INTERNAL MEDICINE

## 2022-10-19 PROCEDURE — 25000003 PHARM REV CODE 250: Performed by: NURSE PRACTITIONER

## 2022-10-19 PROCEDURE — 85730 THROMBOPLASTIN TIME PARTIAL: CPT | Performed by: INTERNAL MEDICINE

## 2022-10-19 PROCEDURE — 99221 1ST HOSP IP/OBS SF/LOW 40: CPT | Mod: ,,, | Performed by: SPECIALIST

## 2022-10-19 PROCEDURE — 85610 PROTHROMBIN TIME: CPT | Performed by: INTERNAL MEDICINE

## 2022-10-19 PROCEDURE — 94761 N-INVAS EAR/PLS OXIMETRY MLT: CPT

## 2022-10-19 PROCEDURE — 21400001 HC TELEMETRY ROOM

## 2022-10-19 PROCEDURE — 85025 COMPLETE CBC W/AUTO DIFF WBC: CPT | Performed by: INTERNAL MEDICINE

## 2022-10-19 PROCEDURE — 36415 COLL VENOUS BLD VENIPUNCTURE: CPT | Performed by: INTERNAL MEDICINE

## 2022-10-19 PROCEDURE — 27000221 HC OXYGEN, UP TO 24 HOURS

## 2022-10-19 PROCEDURE — 84100 ASSAY OF PHOSPHORUS: CPT | Performed by: INTERNAL MEDICINE

## 2022-10-19 PROCEDURE — S4991 NICOTINE PATCH NONLEGEND: HCPCS | Performed by: INTERNAL MEDICINE

## 2022-10-19 PROCEDURE — 84484 ASSAY OF TROPONIN QUANT: CPT | Performed by: INTERNAL MEDICINE

## 2022-10-19 PROCEDURE — A4216 STERILE WATER/SALINE, 10 ML: HCPCS | Performed by: INTERNAL MEDICINE

## 2022-10-19 PROCEDURE — 63600175 PHARM REV CODE 636 W HCPCS: Performed by: INTERNAL MEDICINE

## 2022-10-19 PROCEDURE — 83735 ASSAY OF MAGNESIUM: CPT | Performed by: INTERNAL MEDICINE

## 2022-10-19 PROCEDURE — 82553 CREATINE MB FRACTION: CPT | Performed by: INTERNAL MEDICINE

## 2022-10-19 PROCEDURE — 25000242 PHARM REV CODE 250 ALT 637 W/ HCPCS: Performed by: INTERNAL MEDICINE

## 2022-10-19 PROCEDURE — S0030 INJECTION, METRONIDAZOLE: HCPCS | Performed by: INTERNAL MEDICINE

## 2022-10-19 PROCEDURE — 94640 AIRWAY INHALATION TREATMENT: CPT

## 2022-10-19 PROCEDURE — 99900031 HC PATIENT EDUCATION (STAT)

## 2022-10-19 PROCEDURE — 92610 EVALUATE SWALLOWING FUNCTION: CPT

## 2022-10-19 PROCEDURE — 80053 COMPREHEN METABOLIC PANEL: CPT | Performed by: INTERNAL MEDICINE

## 2022-10-19 PROCEDURE — 99221 PR INITIAL HOSPITAL CARE,LEVL I: ICD-10-PCS | Mod: ,,, | Performed by: SPECIALIST

## 2022-10-19 RX ORDER — MICONAZOLE NITRATE 2 %
POWDER (GRAM) TOPICAL 2 TIMES DAILY
Status: DISCONTINUED | OUTPATIENT
Start: 2022-10-19 | End: 2022-10-28 | Stop reason: HOSPADM

## 2022-10-19 RX ORDER — AMLODIPINE BESYLATE 5 MG/1
5 TABLET ORAL DAILY
Status: DISCONTINUED | OUTPATIENT
Start: 2022-10-19 | End: 2022-10-27

## 2022-10-19 RX ADMIN — DOXYCYCLINE 100 MG: 100 INJECTION, POWDER, LYOPHILIZED, FOR SOLUTION INTRAVENOUS at 10:10

## 2022-10-19 RX ADMIN — CYPROHEPTADINE HYDROCHLORIDE 4 MG: 4 TABLET ORAL at 09:10

## 2022-10-19 RX ADMIN — SUCRALFATE 1 G: 1 TABLET ORAL at 09:10

## 2022-10-19 RX ADMIN — SUCRALFATE 1 G: 1 TABLET ORAL at 05:10

## 2022-10-19 RX ADMIN — SODIUM CHLORIDE, PRESERVATIVE FREE 10 ML: 5 INJECTION INTRAVENOUS at 11:10

## 2022-10-19 RX ADMIN — APIXABAN 5 MG: 5 TABLET, FILM COATED ORAL at 09:10

## 2022-10-19 RX ADMIN — SODIUM CHLORIDE, PRESERVATIVE FREE 10 ML: 5 INJECTION INTRAVENOUS at 05:10

## 2022-10-19 RX ADMIN — MICONAZOLE NITRATE 2 % TOPICAL POWDER: at 02:10

## 2022-10-19 RX ADMIN — ARIPIPRAZOLE 10 MG: 5 TABLET ORAL at 11:10

## 2022-10-19 RX ADMIN — TRAZODONE HYDROCHLORIDE 25 MG: 50 TABLET ORAL at 09:10

## 2022-10-19 RX ADMIN — METHYLPREDNISOLONE SODIUM SUCCINATE 40 MG: 40 INJECTION, POWDER, FOR SOLUTION INTRAMUSCULAR; INTRAVENOUS at 05:10

## 2022-10-19 RX ADMIN — MICONAZOLE NITRATE 2 % TOPICAL POWDER: at 09:10

## 2022-10-19 RX ADMIN — SUCRALFATE 1 G: 1 TABLET ORAL at 11:10

## 2022-10-19 RX ADMIN — METOPROLOL SUCCINATE 50 MG: 50 TABLET, FILM COATED, EXTENDED RELEASE ORAL at 11:10

## 2022-10-19 RX ADMIN — THERA TABS 1 TABLET: TAB at 11:10

## 2022-10-19 RX ADMIN — METRONIDAZOLE 500 MG: 5 INJECTION, SOLUTION INTRAVENOUS at 02:10

## 2022-10-19 RX ADMIN — IPRATROPIUM BROMIDE AND ALBUTEROL SULFATE 3 ML: 2.5; .5 SOLUTION RESPIRATORY (INHALATION) at 08:10

## 2022-10-19 RX ADMIN — APIXABAN 5 MG: 5 TABLET, FILM COATED ORAL at 11:10

## 2022-10-19 RX ADMIN — METRONIDAZOLE 500 MG: 5 INJECTION, SOLUTION INTRAVENOUS at 11:10

## 2022-10-19 RX ADMIN — PANTOPRAZOLE SODIUM 40 MG: 40 TABLET, DELAYED RELEASE ORAL at 11:10

## 2022-10-19 RX ADMIN — IPRATROPIUM BROMIDE AND ALBUTEROL SULFATE 3 ML: 2.5; .5 SOLUTION RESPIRATORY (INHALATION) at 01:10

## 2022-10-19 RX ADMIN — METRONIDAZOLE 500 MG: 5 INJECTION, SOLUTION INTRAVENOUS at 05:10

## 2022-10-19 RX ADMIN — Medication: at 09:10

## 2022-10-19 RX ADMIN — CYPROHEPTADINE HYDROCHLORIDE 4 MG: 4 TABLET ORAL at 11:10

## 2022-10-19 RX ADMIN — NICOTINE 1 PATCH: 14 PATCH TRANSDERMAL at 11:10

## 2022-10-19 RX ADMIN — FERROUS SULFATE TAB 325 MG (65 MG ELEMENTAL FE) 1 EACH: 325 (65 FE) TAB at 11:10

## 2022-10-19 RX ADMIN — TRAMADOL HYDROCHLORIDE 50 MG: 50 TABLET, COATED ORAL at 05:10

## 2022-10-19 RX ADMIN — SUCRALFATE 1 G: 1 TABLET ORAL at 01:10

## 2022-10-19 RX ADMIN — LEVOFLOXACIN 750 MG: 750 INJECTION, SOLUTION INTRAVENOUS at 12:10

## 2022-10-19 RX ADMIN — PANTOPRAZOLE SODIUM 40 MG: 40 TABLET, DELAYED RELEASE ORAL at 09:10

## 2022-10-19 RX ADMIN — ATORVASTATIN CALCIUM 40 MG: 40 TABLET, FILM COATED ORAL at 11:10

## 2022-10-19 RX ADMIN — ASPIRIN 325 MG: 325 TABLET, COATED ORAL at 11:10

## 2022-10-19 RX ADMIN — AMLODIPINE BESYLATE 5 MG: 5 TABLET ORAL at 01:10

## 2022-10-19 RX ADMIN — DOXYCYCLINE 100 MG: 100 INJECTION, POWDER, LYOPHILIZED, FOR SOLUTION INTRAVENOUS at 11:10

## 2022-10-19 NOTE — NURSING
WOCN consult-67 y/o female in with syncope, ataxic gate with hx of cerebellar infarcts.  She is awake alert oriented and moves self well.    Skin issues she has both arms ecchymotic with a dried skin to right elbow area and  some redness to her buttock crease.   Care put in place with instructions to nurse Fortunato.  Instructed pt on need to stay off buttock and turning.      10/19/22 1100        Altered Skin Integrity 10/17/22 2300 Right anterior;lower;proximal Arm #2 Skin Tear Partial thickness tissue loss. Shallow open ulcer with a red or pink wound bed, without slough. Intact or Open/Ruptured Serum-filled blister.   Date First Assessed/Time First Assessed: 10/17/22 2300   Altered Skin Integrity Present on Admission: yes  Side: Right  Orientation: anterior;lower;proximal  Location: Arm  Wound Number: #2  Is this injury device related?: No  Primary Wound Type: Skin...   Wound Image    Dressing Appearance Open to air   Drainage Amount Scant   Drainage Characteristics/Odor Serous;No odor   Appearance Dry  (dry crusty scab)   Periwound Area Dry   Wound Edges Jagged        Altered Skin Integrity 10/06/22 2249 medial Perineum #1 Incontinence associated dermatitis Partial thickness tissue loss. Shallow open ulcer with a red or pink wound bed, without slough. Intact or Open/Ruptured Serum-filled blister.   Date First Assessed/Time First Assessed: 10/06/22 2249   Altered Skin Integrity Present on Admission: yes  Orientation: medial  Location: Perineum  Wound Number: #1  Is this injury device related?: No  Primary Wound Type: Incontinence associated derma...   Wound Image    Dressing Appearance Open to air   Drainage Amount None   Appearance Pink;Intact   Care Cleansed with:;Sterile normal saline;Applied:

## 2022-10-19 NOTE — PT/OT/SLP PROGRESS
Physical Therapy      Patient Name:  Nimo Dill   MRN:  [id number]    Attempted PT session this AM. Pt off floor for imaging, will f/u when able.

## 2022-10-19 NOTE — PROGRESS NOTES
[facility]  Hospital Medicine Progress Note        Chief Complaint: Inpatient Follow-up    HPI:   66-year-old female with significant history of recurrent syncope/fall/ataxic gait.  Diagnosed with the cerebellar infarcts likely embolic on aspirin, Eliquis.  Patient awaiting LINQ placement.  She was admitted to our service and was discharged on 10/11 after being diagnosed with CVA.  Patient admitted hospitalist medicine service on 10/17 again with recurrent syncopal/collapse episodes.  During previous hospitalization patient was significantly anemic, she underwent GI evaluation with EGD showing some ulceration at gastrectomy anastomotic site with no active bleeding.  During previous hospitalization therapy services had recommended skilled nursing facility placement, but the patient refused and went home.  Given persistent/recurrent syncope/collapse decided to repeat MRI.  MRI showed evolving CVA-bilateral cerebral/cerebellar.  Neurology consulted given ongoing symptoms.  CT chest, abdomen, pelvis also ordered given significant weight loss and smoking history.  No occult malignancy.  But consistent with pneumonia.  Initiated appropriate antibiotics.  Home anti thrombotics including Eliquis resumed      Interval Hx:   Patient seen at bedside.  Comfortably laying in bed.  No new complaints.  No  new neurological changes.  BP slightly accelerated.  Otherwise hemodynamics stable.      Objective/physical exam:  General: In no acute distress, afebrile  Chest:  Few scattered wheezes   Heart: S1, S2, no appreciable murmur  Abdomen: Soft, nontender, BS +  MSK: Warm, no lower extremity edema, no clubbing or cyanosis  Neurologic: Alert and oriented x4,     VITAL SIGNS: 24 HRS MIN & MAX LAST   Temp  Min: 97.5 °F (36.4 °C)  Max: 98.1 °F (36.7 °C) 97.9 °F (36.6 °C)   BP  Min: 122/73  Max: 150/78 (!) 142/76   Pulse  Min: 64  Max: 81  68   Resp  Min: 16  Max: 18 16   SpO2  Min: 93 %  Max: 98 % 95 %        Recent Labs   Lab 10/18/22  0505   WBC 12.8*   RBC 3.48*   HGB 9.2*   HCT 31.9*   MCV 91.7   MCH 26.4*   MCHC 28.8*   RDW 17.8*      MPV 10.9*         Recent Labs   Lab 10/18/22  0917 10/19/22  0447   NA  --  135*   K  --  4.4   CO2  --  26   BUN  --  15.6   CREATININE  --  0.63   CALCIUM  --  8.4   PH 7.39  --    MG  --  1.50*   ALBUMIN  --  1.7*   ALKPHOS  --  94   ALT  --  17   AST  --  18   BILITOT  --  0.2          Microbiology Results (last 7 days)       ** No results found for the last 168 hours. **             Scheduled Med:   albuterol-ipratropium  3 mL Nebulization Q6H    apixaban  5 mg Oral BID    ARIPiprazole  10 mg Oral Daily    aspirin  325 mg Oral Daily    atorvastatin  40 mg Oral Daily    cyproheptadine  4 mg Oral BID    doxycycline (VIBRAMYCIN) IVPB  100 mg Intravenous Q12H    ferrous sulfate  1 tablet Oral Daily    levoFLOXacin  750 mg Intravenous Q24H    methylPREDNISolone sodium succinate injection  40 mg Intravenous Q8H    metoprolol succinate  50 mg Oral Daily    metronidazole  500 mg Intravenous Q8H    multivitamin  1 tablet Oral Daily    nicotine  1 patch Transdermal Daily    pantoprazole  40 mg Oral BID    sodium chloride 0.9%  10 mL Intravenous Q6H    sucralfate  1 g Oral QID          Assessment/Plan:    Recurrent syncope/collapse  Subacute CVA-bilateral cerebral/cerebellar  Abnormal gait  Right-sided pneumonia-rule out aspiration  Pulmonary cachexia  COPD with acute exacerbation   Acute on chronic hypoxemic/hypercapnic respiratory failure  Essential HTN-accelerated   HLD   Chronic anemia  Gastric ulcer  Chronic tobacco use  Prophylaxis    MRI brain was repeated 10/18-no new stroke   Confirms evolving bilateral cerebral/cerebellar CVAs  Continue home aspirin, Eliquis   Will have to be very cautious with Eliquis given recurrent falls   I consulted Neurology given her recurrent syncope/collapse, await recs  Bubble study negative   Ultrasound carotid-non impressive  Continue  antibiotics for right-sided pneumonia   Patient is on Levaquin, doxycycline and Flagyl given concern for aspiration   Allergic to penicillin  Patient is NPO awaiting speech recommendations  Continue Ringer lactate  Patient reported unintentional weight loss, CT chest, abdomen/pelvis with no occult malignancy  Continue IV steroids, bronchodilators for COPD exacerbation, clinically improving   Decrease Solu-Medrol to twice daily  ABG with mild hypercapnia  PH is less than 7.4, but patient is awake, alert and oriented   No indication for BiPAP   Supplemental oxygen to keep saturations more than 90%   D-dimer was high, V/Q negative  Check venous ultrasound  Hemoglobin stable, no overt bleeding   Continue PPI, Carafate for history of gastric anastomotic ulcer   Continue other home meds-aripiprazole, statin, iron, Toprol-XL, multivitamin   Nicotine patch   BP accelerated occasionally and therefore  add amlodipine  DVT prophylaxis-on Eliquis    Case management consult for rehab placement    Zofia Carlos MD   10/19/2022

## 2022-10-19 NOTE — PROCEDURES
"Nimo Dill is a 66 y.o. female patient.    Temp: 98 °F (36.7 °C) (10/18/22 1952)  Pulse: 64 (10/18/22 1953)  Resp: 17 (10/18/22 1953)  BP: 129/74 (10/18/22 1952)  SpO2: (!) 93 % (10/18/22 1952)  Weight: 44.5 kg (98 lb) (10/18/22 0600)  Height: 5' 4" (162.6 cm) (10/17/22 2305)    PICC  Date/Time: 10/18/2022 9:31 PM  Performed by: Jt Angel RN  Consent Done: Yes  Time out: Immediately prior to procedure a time out was called to verify the correct patient, procedure, equipment, support staff and site/side marked as required  Indications: med administration and vascular access  Anesthesia: local infiltration  Local anesthetic: lidocaine 1% without epinephrine  Anesthetic Total (mL): 5  Preparation: skin prepped with ChloraPrep  Skin prep agent dried: skin prep agent completely dried prior to procedure  Sterile barriers: all five maximum sterile barriers used - cap, mask, sterile gown, sterile gloves, and large sterile sheet  Hand hygiene: hand hygiene performed prior to central venous catheter insertion  Location details: right basilic  Catheter type: single lumen  Catheter size: 4 Fr  Catheter Length: 14cm    Ultrasound guidance: yes  Vessel Caliber: large and patent, compressibility normal  Vascular Doppler: not done  Needle advanced into vessel with real time Ultrasound guidance.  Guidewire confirmed in vessel.  Sterile sheath used.  no esophageal manometryNumber of attempts: 1  Post-procedure: blood return through all ports, chlorhexidine patch and sterile dressing applied  Technical procedures used: modified seldinger    Assessment: successful placement  Comments: Skin tears to LUE at site where midline would normally be inserted. CHG disk used in place of chg gel s/t poor skin integrity.       Jt Angel RN  10/18/2022    "

## 2022-10-19 NOTE — PLAN OF CARE
Problem: Adult Inpatient Plan of Care  Goal: Plan of Care Review  Outcome: Ongoing, Progressing  Goal: Patient-Specific Goal (Individualized)  Outcome: Ongoing, Progressing  Goal: Absence of Hospital-Acquired Illness or Injury  Outcome: Ongoing, Progressing  Goal: Optimal Comfort and Wellbeing  Outcome: Ongoing, Progressing  Goal: Readiness for Transition of Care  Outcome: Ongoing, Progressing     Problem: Fluid Imbalance (Pneumonia)  Goal: Fluid Balance  Outcome: Ongoing, Progressing     Problem: Infection (Pneumonia)  Goal: Resolution of Infection Signs and Symptoms  Outcome: Ongoing, Progressing     Problem: Respiratory Compromise (Pneumonia)  Goal: Effective Oxygenation and Ventilation  Outcome: Ongoing, Progressing     Problem: Infection  Goal: Absence of Infection Signs and Symptoms  Outcome: Ongoing, Progressing     Problem: Impaired Wound Healing  Goal: Optimal Wound Healing  Outcome: Ongoing, Progressing     Problem: Fall Injury Risk  Goal: Absence of Fall and Fall-Related Injury  Outcome: Ongoing, Progressing     Problem: Skin Injury Risk Increased  Goal: Skin Health and Integrity  Outcome: Ongoing, Progressing     Problem: Adjustment to Illness (Stroke, Ischemic/Transient Ischemic Attack)  Goal: Optimal Coping  Outcome: Ongoing, Progressing     Problem: Bowel Elimination Impaired (Stroke, Ischemic/Transient Ischemic Attack)  Goal: Effective Bowel Elimination  Outcome: Ongoing, Progressing     Problem: Cerebral Tissue Perfusion (Stroke, Ischemic/Transient Ischemic Attack)  Goal: Optimal Cerebral Tissue Perfusion  Outcome: Ongoing, Progressing     Problem: Cognitive Impairment (Stroke, Ischemic/Transient Ischemic Attack)  Goal: Optimal Cognitive Function  Outcome: Ongoing, Progressing     Problem: Communication Impairment (Stroke, Ischemic/Transient Ischemic Attack)  Goal: Improved Communication Skills  Outcome: Ongoing, Progressing     Problem: Functional Ability Impaired (Stroke, Ischemic/Transient  Ischemic Attack)  Goal: Optimal Functional Ability  Outcome: Ongoing, Progressing     Problem: Respiratory Compromise (Stroke, Ischemic/Transient Ischemic Attack)  Goal: Effective Oxygenation and Ventilation  Outcome: Ongoing, Progressing     Problem: Sensorimotor Impairment (Stroke, Ischemic/Transient Ischemic Attack)  Goal: Improved Sensorimotor Function  Outcome: Ongoing, Progressing     Problem: Swallowing Impairment (Stroke, Ischemic/Transient Ischemic Attack)  Goal: Optimal Eating and Swallowing without Aspiration  Outcome: Ongoing, Progressing     Problem: Urinary Elimination Impaired (Stroke, Ischemic/Transient Ischemic Attack)  Goal: Effective Urinary Elimination  Outcome: Ongoing, Progressing

## 2022-10-19 NOTE — CONSULTS
"Inpatient Nutrition Evaluation    Admit Date: 10/17/2022   Total duration of encounter: 2 days    Nutrition Recommendation/Prescription     Continue diet as per SLP/pt tolerates  Add Robert BID to assist with wounds    Nutrition Assessment     Chart Review    Reason Seen: physician consult    Diagnosis:  Recurrent syncope/collapse  Subacute CVA-bilateral cerebral/cerebellar  Abnormal gait  Right-sided pneumonia-rule out aspiration  Pulmonary cachexia  COPD with acute exacerbation   Acute on chronic hypoxemic/hypercapnic respiratory failure  Essential HTN-accelerated   HLD   Chronic anemia  Gastric ulcer  Chronic tobacco use    Relevant Medical History: recurrent falls, CVAs, PUD    Nutrition-Related Medications: cyproheptadine, ferrous sulfate, levofloxacin, solumedrol, flagyl, MVI, Sucralfate    Nutrition-Related Labs:  10/19: WBC-12.7, H/H-8.8/29.6, Na-135, gluc-118, Mg-1.5    Diet Order: Diet Adult Regular Cardiac  Oral Supplement Order: none  Appetite/Oral Intake: NPO/not applicable  Factors Affecting Nutritional Intake: NPO  Food/Congregational/Cultural Preferences: none reported  Food Allergies: none reported    Skin Integrity: bruised (ecchymotic), skin tear  Wound(s):      Altered Skin Integrity 10/17/22 2300 Left anterior;distal;upper Arm #1 Skin Tear Partial thickness tissue loss. Shallow open ulcer with a red or pink wound bed, without slough. Intact or Open/Ruptured Serum-filled blister.-Tissue loss description: Partial thickness noted    Comments    10/19: pt has been NPO. SLP evaluated today for swallow function. Was able to advance diet today. Pt noted 5# wt gain in last weeks. Will add supplement to assist with wounds.     Anthropometrics    Height: 5' 4" (162.6 cm)    Last Weight: 48.3 kg (106 lb 6.4 oz) (10/19/22 0500) Weight Method: Standard Scale  BMI (Calculated): 18.3  BMI Classification: underweight (BMI less than 18.5)     Ideal Body Weight (IBW), Female: 120 lb     % Ideal Body Weight, Female " (lb): 83.33 %                             Usual Weight Provided By: EMR weight history    Wt Readings from Last 5 Encounters:   10/19/22 48.3 kg (106 lb 6.4 oz)   10/06/22 44.6 kg (98 lb 5.2 oz)   06/15/22 46.4 kg (102 lb 3.2 oz)   11/12/21 46.5 kg (102 lb 8.2 oz)   09/30/13 53.6 kg (118 lb 3.2 oz)     Weight Change(s) Since Admission:  Admit Weight: 45.4 kg (100 lb) (10/17/22 1257)  10/19: 48.3kg; 5# wt gain x2 weeks    Patient Education    Not applicable.    Monitoring & Evaluation     Dietitian will monitor food and beverage intake.  Nutrition Risk/Follow-Up: low (follow-up in 5-7 days)  Patients assigned 'low nutrition risk' status do not qualify for a full nutritional assessment but will be monitored and re-evaluated in a 5-7 day time period. Please consult if re-evaluation needed sooner.

## 2022-10-19 NOTE — CONSULTS
[facility]  Neurology  Consult Note    Patient Name: Nimo Dill  MRN: [id number]  Admission Date: 10/17/2022  Hospital Length of Stay: 2 days  Code Status: Full Code   Attending Provider: Kvng Dailey MD   Consulting Provider: NELI Ivey  Primary Care Physician: EILEEN Figueroa  Principal Problem:<principal problem not specified>    Inpatient consult to Neurology  Consult performed by: NELI Ivey  Consult ordered by: Zofia Carlos MD         Subjective:     Chief Complaint:  No chief complaint on file.         HPI:   Nimo Dill is a 66 y.o. female, with past medical history of tobacco use, PUD/ gastric perforation s/p gastrectomy in 2018, who presented to [facility] on 10/17/2022 as level 2 trauma after sustaining ground level fall at home.  Patient reported syncopal episode and hit the back of her head.  MRI brain showed evolving ischemic changes in the bilateral cerebral and cerebellar hemispheres; no new acute intracranial abnormality identified.  Neurology was consulted for stroke workup.      Of note, patient presented to ED on 10/6 with reports of generalized weakness and multiple falls x2 weeks.  She was found to have sepsis, community acquired pneumonia, and UTI (E. coli).  Stroke workup revealed several acute pontine and infratentorial supratentorial nonhemorrhagic infarct.  Suspicious for cardioembolic etiology.  Neuro rec LINQ and Xarelto 10mg/day once cleared by GI.  Also underwent GI workup, including EGD on 10/10 ... which showed ulceration without bleeding.  She was discharged home on 10/11/22 ... home medications included Eliquis 5mg BID, ASA 81mg daily, and Atorvastatin 40mg daily.  LINQ planned as outpatient.       Past Medical History:   Diagnosis Date    Common bile duct dilatation     Epigastric abdominal pain     Gastric ulcer, acute with hemorrhage and perforation     PUD (peptic ulcer disease)        Past Surgical  History:   Procedure Laterality Date    APPENDECTOMY       SECTION, CLASSIC      CHOLECYSTECTOMY      Distal gastrectomy      ESOPHAGOGASTRODUODENOSCOPY N/A 10/10/2022    Procedure: EGD;  Surgeon: Nati Smith MD;  Location: [facility] ENDOSCOPY;  Service: Gastroenterology;  Laterality: N/A;    GASTRECTOMY      HYSTERECTOMY      Rectovaginal fistula repair      TONSILLECTOMY      VAGOTOMY AND PYLOROPLASTY         Review of patient's allergies indicates:   Allergen Reactions    Penicillins Hives and Blisters         No current facility-administered medications on file prior to encounter.     Current Outpatient Medications on File Prior to Encounter   Medication Sig    alprazolam (XANAX) 0.5 MG tablet Take 0.5 mg by mouth 3 (three) times daily as needed.     apixaban (ELIQUIS) 5 mg Tab Take 1 tablet (5 mg total) by mouth 2 (two) times daily.    ARIPiprazole (ABILIFY) 10 MG Tab Take 10 mg by mouth once daily.    aspirin 81 MG Chew Take 1 tablet (81 mg total) by mouth once daily.    atorvastatin (LIPITOR) 40 MG tablet Take 1 tablet (40 mg total) by mouth once daily.    ferrous sulfate 325 (65 FE) MG EC tablet Take 1 tablet (325 mg total) by mouth once daily.    metoprolol succinate (TOPROL-XL) 50 MG 24 hr tablet Take 1 tablet (50 mg total) by mouth once daily.    pantoprazole (PROTONIX) 40 MG tablet Take 1 tablet (40 mg total) by mouth 2 (two) times daily.    paroxetine (PAXIL) 40 MG tablet Take 40 mg by mouth once daily.    zolpidem (AMBIEN) 10 mg Tab Take 10 mg by mouth nightly as needed.    cyproheptadine (PERIACTIN) 4 mg tablet     HYDROcodone-acetaminophen (NORCO) 5-325 mg per tablet Take 1 tablet by mouth every 6 (six) hours as needed.    ketoconazole (NIZORAL) 2 % cream Apply topically once daily. for 7 days    multivitamin Tab Take 1 tablet by mouth once daily.    sucralfate (CARAFATE) 1 gram tablet Take 1 tablet (1 g total) by mouth 4 (four) times daily.     Family History        Problem Relation (Age of Onset)    Hypertension Father    Stroke Father          Tobacco Use    Smoking status: Every Day     Packs/day: 1.00     Years: 40.00     Pack years: 40.00     Types: Cigarettes    Smokeless tobacco: Never   Substance and Sexual Activity    Alcohol use: No    Drug use: No    Sexual activity: Yes     Partners: Male     Review of Systems   Constitutional:  Positive for unexpected weight change.   Cardiovascular:  Positive for palpitations.   Musculoskeletal:  Positive for gait problem (trouble walking).   Neurological:  Positive for weakness.   All other systems reviewed and are negative.    Objective:     Vital Signs (Most Recent):  Temp: 97.7 °F (36.5 °C) (10/19/22 1150)  Pulse: 78 (10/19/22 1150)  Resp: 18 (10/19/22 1150)  BP: (!) 152/79 (10/19/22 1150)  SpO2: 99 % (10/19/22 1150)   Vital Signs (24h Range):  Temp:  [97.7 °F (36.5 °C)-98.1 °F (36.7 °C)] 97.7 °F (36.5 °C)  Pulse:  [61-78] 78  Resp:  [16-18] 18  SpO2:  [93 %-99 %] 99 %  BP: (129-152)/(74-96) 152/79     Weight: 48.3 kg (106 lb 6.4 oz)  Body mass index is 18.26 kg/m².    Physical Exam  Constitutional:       General: She is not in acute distress.     Appearance: She is ill-appearing.   Eyes:      General: No visual field deficit.     Extraocular Movements: Extraocular movements intact.      Pupils: Pupils are equal, round, and reactive to light.   Cardiovascular:      Rate and Rhythm: Normal rate and regular rhythm.   Pulmonary:      Effort: Pulmonary effort is normal.   Skin:     General: Skin is warm and dry.   Neurological:      Mental Status: She is alert.      Cranial Nerves: Facial asymmetry (mild left facial droop) present. No dysarthria.      Motor: Weakness (generalized weakness) and pronator drift (RUE) present.   Psychiatric:         Mood and Affect: Mood normal.         Behavior: Behavior normal.       Significant Labs: BMP:   Recent Labs   Lab 10/17/22  1357 10/18/22  0505 10/19/22  0447    142 135*   K  4.3 4.8 4.4   CO2 27 26 26   BUN 16.4 15.5 15.6   CREATININE 0.76 0.75 0.63   CALCIUM 8.5 8.4 8.4   MG 1.60 1.60 1.50*     CBC:   Recent Labs   Lab 10/17/22  1308 10/18/22  0505 10/19/22  0447   WBC 14.8* 12.8* 12.7*   HGB 9.9* 9.2* 8.8*   HCT 33.0* 31.9* 29.6*   * 311 338       Significant Imaging: I have reviewed all pertinent imaging results/findings within the past 24 hours.    Assessment and Plan:     Stroke  MRI brain showed evolving bilateral cerebral and cerebellar infarcts.  Continue Eliquis 5mg BID  Continue Atorvastatin 40mg daily  Consider LINQ placement while inpatient  PT/OT/ST        Thank you for your consult. Further recommendations to follow by MD.    Verito Laird, FNP  Neurology  [facility] - On[facility] Acute

## 2022-10-19 NOTE — PLAN OF CARE
SW was notified there is currently no bed available at this time. Sw was notified that pt experienced recent changes therefore she is not ready for rehab services at this time.

## 2022-10-19 NOTE — SUBJECTIVE & OBJECTIVE
Past Medical History:   Diagnosis Date    Common bile duct dilatation     Epigastric abdominal pain     Gastric ulcer, acute with hemorrhage and perforation     PUD (peptic ulcer disease)        Past Surgical History:   Procedure Laterality Date    APPENDECTOMY       SECTION, CLASSIC      CHOLECYSTECTOMY      Distal gastrectomy      ESOPHAGOGASTRODUODENOSCOPY N/A 10/10/2022    Procedure: EGD;  Surgeon: Nati Smith MD;  Location: [facility] ENDOSCOPY;  Service: Gastroenterology;  Laterality: N/A;    GASTRECTOMY      HYSTERECTOMY      Rectovaginal fistula repair      TONSILLECTOMY      VAGOTOMY AND PYLOROPLASTY         Review of patient's allergies indicates:   Allergen Reactions    Penicillins Hives and Blisters         No current facility-administered medications on file prior to encounter.     Current Outpatient Medications on File Prior to Encounter   Medication Sig    alprazolam (XANAX) 0.5 MG tablet Take 0.5 mg by mouth 3 (three) times daily as needed.     apixaban (ELIQUIS) 5 mg Tab Take 1 tablet (5 mg total) by mouth 2 (two) times daily.    ARIPiprazole (ABILIFY) 10 MG Tab Take 10 mg by mouth once daily.    aspirin 81 MG Chew Take 1 tablet (81 mg total) by mouth once daily.    atorvastatin (LIPITOR) 40 MG tablet Take 1 tablet (40 mg total) by mouth once daily.    ferrous sulfate 325 (65 FE) MG EC tablet Take 1 tablet (325 mg total) by mouth once daily.    metoprolol succinate (TOPROL-XL) 50 MG 24 hr tablet Take 1 tablet (50 mg total) by mouth once daily.    pantoprazole (PROTONIX) 40 MG tablet Take 1 tablet (40 mg total) by mouth 2 (two) times daily.    paroxetine (PAXIL) 40 MG tablet Take 40 mg by mouth once daily.    zolpidem (AMBIEN) 10 mg Tab Take 10 mg by mouth nightly as needed.    cyproheptadine (PERIACTIN) 4 mg tablet     HYDROcodone-acetaminophen (NORCO) 5-325 mg per tablet Take 1 tablet by mouth every 6 (six) hours as needed.    ketoconazole (NIZORAL) 2 % cream Apply topically once daily. for  7 days    multivitamin Tab Take 1 tablet by mouth once daily.    sucralfate (CARAFATE) 1 gram tablet Take 1 tablet (1 g total) by mouth 4 (four) times daily.     Family History       Problem Relation (Age of Onset)    Hypertension Father    Stroke Father          Tobacco Use    Smoking status: Every Day     Packs/day: 1.00     Years: 40.00     Pack years: 40.00     Types: Cigarettes    Smokeless tobacco: Never   Substance and Sexual Activity    Alcohol use: No    Drug use: No    Sexual activity: Yes     Partners: Male     Review of Systems   Constitutional:  Positive for unexpected weight change.   Cardiovascular:  Positive for palpitations.   Musculoskeletal:  Positive for gait problem (trouble walking).   Neurological:  Positive for weakness.   All other systems reviewed and are negative.    Objective:     Vital Signs (Most Recent):  Temp: 97.7 °F (36.5 °C) (10/19/22 1150)  Pulse: 78 (10/19/22 1150)  Resp: 18 (10/19/22 1150)  BP: (!) 152/79 (10/19/22 1150)  SpO2: 99 % (10/19/22 1150)   Vital Signs (24h Range):  Temp:  [97.7 °F (36.5 °C)-98.1 °F (36.7 °C)] 97.7 °F (36.5 °C)  Pulse:  [61-78] 78  Resp:  [16-18] 18  SpO2:  [93 %-99 %] 99 %  BP: (129-152)/(74-96) 152/79     Weight: 48.3 kg (106 lb 6.4 oz)  Body mass index is 18.26 kg/m².    Physical Exam  Constitutional:       General: She is not in acute distress.     Appearance: She is ill-appearing.   Eyes:      General: No visual field deficit.     Extraocular Movements: Extraocular movements intact.      Pupils: Pupils are equal, round, and reactive to light.   Cardiovascular:      Rate and Rhythm: Normal rate and regular rhythm.   Pulmonary:      Effort: Pulmonary effort is normal.   Skin:     General: Skin is warm and dry.   Neurological:      Mental Status: She is alert.      Cranial Nerves: Facial asymmetry (mild left facial droop) present. No dysarthria.      Motor: Weakness (generalized weakness) and pronator drift (RUE) present.   Psychiatric:         Mood  and Affect: Mood normal.         Behavior: Behavior normal.       Significant Labs: BMP:   Recent Labs   Lab 10/17/22  1357 10/18/22  0505 10/19/22  0447    142 135*   K 4.3 4.8 4.4   CO2 27 26 26   BUN 16.4 15.5 15.6   CREATININE 0.76 0.75 0.63   CALCIUM 8.5 8.4 8.4   MG 1.60 1.60 1.50*     CBC:   Recent Labs   Lab 10/17/22  1308 10/18/22  0505 10/19/22  0447   WBC 14.8* 12.8* 12.7*   HGB 9.9* 9.2* 8.8*   HCT 33.0* 31.9* 29.6*   * 311 338       Significant Imaging: I have reviewed all pertinent imaging results/findings within the past 24 hours.

## 2022-10-19 NOTE — ASSESSMENT & PLAN NOTE
MRI brain showed evolving bilateral cerebral and cerebellar infarcts.  Continue Eliquis 5mg BID  Continue Atorvastatin 40mg daily  Consider LINQ placement while inpatient  PT/OT/ST

## 2022-10-19 NOTE — HPI
Nimo Dill is a 66 y.o. female, with past medical history of tobacco use, PUD/ gastric perforation s/p gastrectomy in 2018, who presented to [facility] on 10/17/2022 as level 2 trauma after sustaining ground level fall at home.  Patient reported syncopal episode and hit the back of her head.  MRI brain showed evolving ischemic changes in the bilateral cerebral and cerebellar hemispheres; no new acute intracranial abnormality identified.  Neurology was consulted for stroke workup.      Of note, patient presented to ED on 10/6 with reports of generalized weakness and multiple falls x2 weeks.  She was found to have sepsis, community acquired pneumonia, and UTI (E. coli).  Stroke workup revealed several acute pontine and infratentorial supratentorial nonhemorrhagic infarct.  Suspicious for cardioembolic etiology.  Neuro rec LINQ and Xarelto 10mg/day once cleared by GI.  Also underwent GI workup, including EGD on 10/10 ... which showed ulceration without bleeding.  She was discharged home on 10/11/22 ... home medications included Eliquis 5mg BID, ASA 81mg daily, and Atorvastatin 40mg daily.  LINQ planned as outpatient.

## 2022-10-19 NOTE — PT/OT/SLP PROGRESS
Physical Therapy      Patient Name:  Nmio Dill   MRN:  [id number]    Pt politely declined session today, stating that she had not slept last night, and hasn't gotten to rest at all today. PT to f/u tomorrow.

## 2022-10-19 NOTE — PT/OT/SLP PROGRESS
"Speech Language Pathology Department  Clinical Swallow Evaluation    Patient Name:  Nimo Dill   MRN:  [id number]  Admitting Diagnosis: syncope    Recommendations:     General recommendations:  SLP intervention not indicated  Diet recommendations:  Regular Diet - IDDSI Level 7, Liquid Diet Level: Thin liquids - IDDSI Level 0   Swallow strategies/precautions: small bites/sips and slow rate  Precautions: Standard, fall    History:     Past Medical History:   Diagnosis Date    Common bile duct dilatation     Epigastric abdominal pain     Gastric ulcer, acute with hemorrhage and perforation     PUD (peptic ulcer disease)        Past Surgical History:   Procedure Laterality Date    APPENDECTOMY       SECTION, CLASSIC      CHOLECYSTECTOMY      Distal gastrectomy      ESOPHAGOGASTRODUODENOSCOPY N/A 10/10/2022    Procedure: EGD;  Surgeon: Nati Smith MD;  Location: [facility] ENDOSCOPY;  Service: Gastroenterology;  Laterality: N/A;    GASTRECTOMY      HYSTERECTOMY      Rectovaginal fistula repair      TONSILLECTOMY      VAGOTOMY AND PYLOROPLASTY         Home Diet: Regular and thin liquids  Current Method of Nutrition: NPO    Patient complaint: "to eat/drink"    Subjective     Patient awake, alert, and oriented x4 .    Patient goals: "to eat/drink"     Pain/Comfort: Pain Rating 1: 0/10    Objective:     Oral Musculature Evaluation  Oral Musculature: WFL  Dentition: present and adequate  Secretion Management: adequate  Mucosal Quality: good  Oral Labial Strength and Mobility: WFL    Consistency Fed By Oral Symptoms Pharyngeal Symptoms   Thin liquid by cup Self None None   Thin liquid by straw Self None None   Puree SLP None None   Chewable solid Self None None     Assessment:     Pt presents with no overt signs/sx of aspiration during PO intake. Swallow fx remains WFL. No further skilled SLP intervention warranted at this time, please reconsult as needed.     Plan:     Plan of Care reviewed with:  patient   SLP " Follow-Up:  No      Time Tracking:     SLP Treatment Date:   10/19/22  Speech Start Time:  1020  Speech Stop Time:  1040     Speech Total Time (min):  20 min    Billable minutes:  Swallow and Oral Function Evaluation, 20 minutes      10/19/2022

## 2022-10-20 LAB
ALBUMIN SERPL-MCNC: 1.7 GM/DL (ref 3.4–4.8)
ALBUMIN/GLOB SERPL: 0.6 RATIO (ref 1.1–2)
ALP SERPL-CCNC: 78 UNIT/L (ref 40–150)
ALT SERPL-CCNC: 14 UNIT/L (ref 0–55)
AST SERPL-CCNC: 13 UNIT/L (ref 5–34)
BASOPHILS # BLD AUTO: 0.02 X10(3)/MCL (ref 0–0.2)
BASOPHILS NFR BLD AUTO: 0.2 %
BILIRUBIN DIRECT+TOT PNL SERPL-MCNC: 0.1 MG/DL
BUN SERPL-MCNC: 17.4 MG/DL (ref 9.8–20.1)
CALCIUM SERPL-MCNC: 8.4 MG/DL (ref 8.4–10.2)
CHLORIDE SERPL-SCNC: 102 MMOL/L (ref 98–107)
CO2 SERPL-SCNC: 25 MMOL/L (ref 23–31)
CREAT SERPL-MCNC: 0.71 MG/DL (ref 0.55–1.02)
EOSINOPHIL # BLD AUTO: 0 X10(3)/MCL (ref 0–0.9)
EOSINOPHIL NFR BLD AUTO: 0 %
ERYTHROCYTE [DISTWIDTH] IN BLOOD BY AUTOMATED COUNT: 17.5 % (ref 11.5–17)
GFR SERPLBLD CREATININE-BSD FMLA CKD-EPI: >60 MLS/MIN/1.73/M2
GLOBULIN SER-MCNC: 2.8 GM/DL (ref 2.4–3.5)
GLUCOSE SERPL-MCNC: 86 MG/DL (ref 82–115)
HCT VFR BLD AUTO: 27.2 % (ref 37–47)
HGB BLD-MCNC: 8 GM/DL (ref 12–16)
IMM GRANULOCYTES # BLD AUTO: 0.18 X10(3)/MCL (ref 0–0.04)
IMM GRANULOCYTES NFR BLD AUTO: 1.4 %
LYMPHOCYTES # BLD AUTO: 0.93 X10(3)/MCL (ref 0.6–4.6)
LYMPHOCYTES NFR BLD AUTO: 7.3 %
MCH RBC QN AUTO: 26.2 PG (ref 27–31)
MCHC RBC AUTO-ENTMCNC: 29.4 MG/DL (ref 33–36)
MCV RBC AUTO: 89.2 FL (ref 80–94)
MONOCYTES # BLD AUTO: 0.42 X10(3)/MCL (ref 0.1–1.3)
MONOCYTES NFR BLD AUTO: 3.3 %
NEUTROPHILS # BLD AUTO: 11.1 X10(3)/MCL (ref 2.1–9.2)
NEUTROPHILS NFR BLD AUTO: 87.8 %
NRBC BLD AUTO-RTO: 0 %
PLATELET # BLD AUTO: 324 X10(3)/MCL (ref 130–400)
PMV BLD AUTO: 10.1 FL (ref 7.4–10.4)
POTASSIUM SERPL-SCNC: 4.6 MMOL/L (ref 3.5–5.1)
PROT SERPL-MCNC: 4.5 GM/DL (ref 5.8–7.6)
RBC # BLD AUTO: 3.05 X10(6)/MCL (ref 4.2–5.4)
SODIUM SERPL-SCNC: 135 MMOL/L (ref 136–145)
WBC # SPEC AUTO: 12.7 X10(3)/MCL (ref 4.5–11.5)

## 2022-10-20 PROCEDURE — 25000003 PHARM REV CODE 250: Performed by: INTERNAL MEDICINE

## 2022-10-20 PROCEDURE — 97530 THERAPEUTIC ACTIVITIES: CPT

## 2022-10-20 PROCEDURE — A4216 STERILE WATER/SALINE, 10 ML: HCPCS | Performed by: INTERNAL MEDICINE

## 2022-10-20 PROCEDURE — 99900035 HC TECH TIME PER 15 MIN (STAT)

## 2022-10-20 PROCEDURE — 27000221 HC OXYGEN, UP TO 24 HOURS

## 2022-10-20 PROCEDURE — S4991 NICOTINE PATCH NONLEGEND: HCPCS | Performed by: INTERNAL MEDICINE

## 2022-10-20 PROCEDURE — 85025 COMPLETE CBC W/AUTO DIFF WBC: CPT | Performed by: INTERNAL MEDICINE

## 2022-10-20 PROCEDURE — 94761 N-INVAS EAR/PLS OXIMETRY MLT: CPT

## 2022-10-20 PROCEDURE — 80053 COMPREHEN METABOLIC PANEL: CPT | Performed by: INTERNAL MEDICINE

## 2022-10-20 PROCEDURE — 97535 SELF CARE MNGMENT TRAINING: CPT

## 2022-10-20 PROCEDURE — 63600175 PHARM REV CODE 636 W HCPCS: Performed by: INTERNAL MEDICINE

## 2022-10-20 PROCEDURE — 36415 COLL VENOUS BLD VENIPUNCTURE: CPT | Performed by: INTERNAL MEDICINE

## 2022-10-20 PROCEDURE — 94640 AIRWAY INHALATION TREATMENT: CPT

## 2022-10-20 PROCEDURE — 25000003 PHARM REV CODE 250: Performed by: NURSE PRACTITIONER

## 2022-10-20 PROCEDURE — S0030 INJECTION, METRONIDAZOLE: HCPCS | Performed by: INTERNAL MEDICINE

## 2022-10-20 PROCEDURE — 25000242 PHARM REV CODE 250 ALT 637 W/ HCPCS: Performed by: INTERNAL MEDICINE

## 2022-10-20 PROCEDURE — 21400001 HC TELEMETRY ROOM

## 2022-10-20 RX ADMIN — DOXYCYCLINE 100 MG: 100 INJECTION, POWDER, LYOPHILIZED, FOR SOLUTION INTRAVENOUS at 09:10

## 2022-10-20 RX ADMIN — APIXABAN 5 MG: 5 TABLET, FILM COATED ORAL at 09:10

## 2022-10-20 RX ADMIN — TRAMADOL HYDROCHLORIDE 50 MG: 50 TABLET, COATED ORAL at 01:10

## 2022-10-20 RX ADMIN — IPRATROPIUM BROMIDE AND ALBUTEROL SULFATE 3 ML: 2.5; .5 SOLUTION RESPIRATORY (INHALATION) at 01:10

## 2022-10-20 RX ADMIN — MICONAZOLE NITRATE 2 % TOPICAL POWDER: at 09:10

## 2022-10-20 RX ADMIN — SUCRALFATE 1 G: 1 TABLET ORAL at 09:10

## 2022-10-20 RX ADMIN — FERROUS SULFATE TAB 325 MG (65 MG ELEMENTAL FE) 1 EACH: 325 (65 FE) TAB at 09:10

## 2022-10-20 RX ADMIN — IPRATROPIUM BROMIDE AND ALBUTEROL SULFATE 3 ML: 2.5; .5 SOLUTION RESPIRATORY (INHALATION) at 07:10

## 2022-10-20 RX ADMIN — METHYLPREDNISOLONE SODIUM SUCCINATE 40 MG: 40 INJECTION, POWDER, FOR SOLUTION INTRAMUSCULAR; INTRAVENOUS at 05:10

## 2022-10-20 RX ADMIN — TRAZODONE HYDROCHLORIDE 25 MG: 50 TABLET ORAL at 08:10

## 2022-10-20 RX ADMIN — PANTOPRAZOLE SODIUM 40 MG: 40 TABLET, DELAYED RELEASE ORAL at 09:10

## 2022-10-20 RX ADMIN — SODIUM CHLORIDE, POTASSIUM CHLORIDE, SODIUM LACTATE AND CALCIUM CHLORIDE: 600; 310; 30; 20 INJECTION, SOLUTION INTRAVENOUS at 03:10

## 2022-10-20 RX ADMIN — APIXABAN 5 MG: 5 TABLET, FILM COATED ORAL at 08:10

## 2022-10-20 RX ADMIN — METOPROLOL SUCCINATE 50 MG: 50 TABLET, FILM COATED, EXTENDED RELEASE ORAL at 09:10

## 2022-10-20 RX ADMIN — TRAMADOL HYDROCHLORIDE 50 MG: 50 TABLET, COATED ORAL at 04:10

## 2022-10-20 RX ADMIN — Medication: at 09:10

## 2022-10-20 RX ADMIN — SUCRALFATE 1 G: 1 TABLET ORAL at 12:10

## 2022-10-20 RX ADMIN — ATORVASTATIN CALCIUM 40 MG: 40 TABLET, FILM COATED ORAL at 09:10

## 2022-10-20 RX ADMIN — NICOTINE 1 PATCH: 14 PATCH TRANSDERMAL at 09:10

## 2022-10-20 RX ADMIN — AMLODIPINE BESYLATE 5 MG: 5 TABLET ORAL at 09:10

## 2022-10-20 RX ADMIN — SODIUM CHLORIDE, PRESERVATIVE FREE 10 ML: 5 INJECTION INTRAVENOUS at 12:10

## 2022-10-20 RX ADMIN — METRONIDAZOLE 500 MG: 5 INJECTION, SOLUTION INTRAVENOUS at 03:10

## 2022-10-20 RX ADMIN — SUCRALFATE 1 G: 1 TABLET ORAL at 04:10

## 2022-10-20 RX ADMIN — CYPROHEPTADINE HYDROCHLORIDE 4 MG: 4 TABLET ORAL at 08:10

## 2022-10-20 RX ADMIN — Medication: at 08:10

## 2022-10-20 RX ADMIN — METRONIDAZOLE 500 MG: 5 INJECTION, SOLUTION INTRAVENOUS at 11:10

## 2022-10-20 RX ADMIN — METRONIDAZOLE 500 MG: 5 INJECTION, SOLUTION INTRAVENOUS at 07:10

## 2022-10-20 RX ADMIN — SODIUM CHLORIDE, PRESERVATIVE FREE 10 ML: 5 INJECTION INTRAVENOUS at 06:10

## 2022-10-20 RX ADMIN — PANTOPRAZOLE SODIUM 40 MG: 40 TABLET, DELAYED RELEASE ORAL at 08:10

## 2022-10-20 RX ADMIN — ARIPIPRAZOLE 10 MG: 5 TABLET ORAL at 09:10

## 2022-10-20 RX ADMIN — SODIUM CHLORIDE, PRESERVATIVE FREE 10 ML: 5 INJECTION INTRAVENOUS at 05:10

## 2022-10-20 RX ADMIN — TRAMADOL HYDROCHLORIDE 50 MG: 50 TABLET, COATED ORAL at 09:10

## 2022-10-20 RX ADMIN — SUCRALFATE 1 G: 1 TABLET ORAL at 08:10

## 2022-10-20 RX ADMIN — LEVOFLOXACIN 750 MG: 750 INJECTION, SOLUTION INTRAVENOUS at 12:10

## 2022-10-20 RX ADMIN — DOXYCYCLINE 100 MG: 100 INJECTION, POWDER, LYOPHILIZED, FOR SOLUTION INTRAVENOUS at 10:10

## 2022-10-20 RX ADMIN — THERA TABS 1 TABLET: TAB at 09:10

## 2022-10-20 RX ADMIN — MICONAZOLE NITRATE 2 % TOPICAL POWDER: at 08:10

## 2022-10-20 RX ADMIN — CYPROHEPTADINE HYDROCHLORIDE 4 MG: 4 TABLET ORAL at 09:10

## 2022-10-20 NOTE — PT/OT/SLP PROGRESS
Occupational Therapy  Treatment    Nimo Dill   MRN: [id number]   Admitting Diagnosis: <principal problem not specified>    OT Date of Treatment: 10/20/22   OT Start Time: 0940  OT Stop Time: 1005  OT Total Time (min): 25 min    Billable Minutes:  Self Care/Home Management 15 and Therapeutic Activity 10    OT/SHERLYN: OT     SHERLYN Visit Number: 1    General Precautions: Standard, fall  Orthopedic Precautions:    Braces:    Respiratory Status: Nasal cannula, flow 2 L/min         Subjective:  Communicated with nurse prior to session.  Pt receiving medications during session, nurse assessed skin at buttocks with noted improvement.  Pt's diaper soiled with urine, pt unaware, OT assisted with changing brief.  Pain/Comfort  Pain Rating 1: 5/10  Location 1: other (see comments) (top of head (noted hematoma) and neck)  Pain Addressed 1: Pre-medicate for activity, Reposition, Distraction, Nurse notified  Pain Rating Post-Intervention 1: 4/10    Objective:  Patient found with: telemetry, oxygen, pulse ox (continuous), PICC line     Functional Mobility:  Bed Mobility:  SBA supine to sitting EOB       Transfers:  min A/ CGA to tf EOB to recliner chair        Functional Ambulation: NT    Activities of Daily Living:     Pt demonstrated UB dressing with SBA, min A to complete task of donning socks seated at recliner chair d/t fatigue/poor endurance for activity.  Pt required max A for clothing and hygiene management in standing following bladder accident.    Therapeutic Activities and Exercises:  Dynamic sitting balance training seated EOB, alternating UE reaching with SBA, and sit to stand from recliner chair x3 sets with CGA.    AM-PAC 6 CLICK ADL   How much help from another person does this patient currently need?   1 = Unable, Total/Dependent Assistance  2 = A lot, Maximum/Moderate Assistance  3 = A little, Minimum/Contact Guard/Supervision  4 = None, Modified [place]/Independent    Putting on and taking off regular lower  "body clothing? : 3  Bathing (including washing, rinsing, drying)?: 3  Toileting, which includes using toilet, bedpan, or urinal? : 3  Putting on and taking off regular upper body clothing?: 4  Taking care of personal grooming such as brushing teeth?: 4  Eating meals?: 4  Daily Activity Total Score: 21     AM-PAC Raw Score CMS "G-Code Modifier Level of Impairment Assistance   6 % Total / Unable   7 - 8 CM 80 - 100% Maximal Assist   9-13 CL 60 - 80% Moderate Assist   14 - 19 CK 40 - 60% Moderate Assist   20 - 22 CJ 20 - 40% Minimal Assist   23 CI 1-20% SBA / CGA   24 CH 0% Independent/ Mod I       Patient left up in chair with all lines intact, call button in reach, and nurse notified    ASSESSMENT:  Nimo Dill is a 66 y.o. female with a medical diagnosis of <principal problem not specified> and presents with functional decline.    Rehab identified problem list/impairments: Rehab identified problem list/impairments: weakness, impaired endurance, impaired self care skills, gait instability, pain, impaired fine motor    Rehab potential is good.    Activity tolerance: Good    Discharge recommendations: Discharge Facility/Level of Care Needs: rehabilitation facility     Barriers to discharge:      Equipment recommendations:       GOALS:   Multidisciplinary Problems       Occupational Therapy Goals          Problem: Occupational Therapy    Goal Priority Disciplines Outcome Interventions   Occupational Therapy Goal     OT, PT/OT Ongoing, Progressing    Description: Goals to be met by: 11/01/22     Patient will increase functional independence with ADLs by performing:    UE Dressing with [place].  LE Dressing with Stand-by Assistance with RW.  Grooming while standing at sink with Stand-by Assistance with RW.  Toileting from bedside commode with Stand-by Assistance for hygiene and clothing management with RW.   Bathing from  shower chair/bench with Supervision.  Supine to sit with [place].  Toilet " transfer to bedside commode with Stand-by Assistance with RW.                         Plan:  Patient to be seen 5 x/week, daily to address the above listed problems via self-care/home management, therapeutic activities, therapeutic exercises  Plan of Care expires: 11/01/22  Plan of Care reviewed with: patient, spouse         10/20/2022

## 2022-10-20 NOTE — PLAN OF CARE
Problem: Adult Inpatient Plan of Care  Goal: Plan of Care Review  Outcome: Ongoing, Progressing  Goal: Patient-Specific Goal (Individualized)  Outcome: Ongoing, Progressing  Goal: Absence of Hospital-Acquired Illness or Injury  Outcome: Ongoing, Progressing  Intervention: Identify and Manage Fall Risk  Flowsheets (Taken 10/20/2022 0732)  Safety Promotion/Fall Prevention:   assistive device/personal item within reach   side rails raised x 2   instructed to call staff for mobility   nonskid shoes/socks when out of bed  Intervention: Prevent Skin Injury  Flowsheets (Taken 10/20/2022 0732)  Body Position:   position changed independently   left   side-lying  Intervention: Prevent and Manage VTE (Venous Thromboembolism) Risk  Flowsheets (Taken 10/20/2022 0732)  VTE Prevention/Management:   remove, assess skin, and reapply sequential compression device   ambulation promoted   fluids promoted   intravenous hydration  Intervention: Prevent Infection  Flowsheets (Taken 10/20/2022 0732)  Infection Prevention:   hand hygiene promoted   single patient room provided  Goal: Optimal Comfort and Wellbeing  Outcome: Ongoing, Progressing  Goal: Readiness for Transition of Care  Outcome: Ongoing, Progressing     Problem: Fluid Imbalance (Pneumonia)  Goal: Fluid Balance  Outcome: Ongoing, Progressing     Problem: Infection (Pneumonia)  Goal: Resolution of Infection Signs and Symptoms  Outcome: Ongoing, Progressing     Problem: Respiratory Compromise (Pneumonia)  Goal: Effective Oxygenation and Ventilation  Outcome: Ongoing, Progressing     Problem: Infection  Goal: Absence of Infection Signs and Symptoms  Outcome: Ongoing, Progressing     Problem: Impaired Wound Healing  Goal: Optimal Wound Healing  Outcome: Ongoing, Progressing     Problem: Fall Injury Risk  Goal: Absence of Fall and Fall-Related Injury  Outcome: Ongoing, Progressing  Intervention: Identify and Manage Contributors  Flowsheets (Taken 10/20/2022 0732)  Self-Care  Promotion: independence encouraged  Medication Review/Management: medications reviewed     Problem: Skin Injury Risk Increased  Goal: Skin Health and Integrity  Outcome: Ongoing, Progressing     Problem: Adjustment to Illness (Stroke, Ischemic/Transient Ischemic Attack)  Goal: Optimal Coping  Outcome: Ongoing, Progressing     Problem: Bowel Elimination Impaired (Stroke, Ischemic/Transient Ischemic Attack)  Goal: Effective Bowel Elimination  Outcome: Ongoing, Progressing     Problem: Cerebral Tissue Perfusion (Stroke, Ischemic/Transient Ischemic Attack)  Goal: Optimal Cerebral Tissue Perfusion  Outcome: Ongoing, Progressing     Problem: Cognitive Impairment (Stroke, Ischemic/Transient Ischemic Attack)  Goal: Optimal Cognitive Function  Outcome: Ongoing, Progressing     Problem: Communication Impairment (Stroke, Ischemic/Transient Ischemic Attack)  Goal: Improved Communication Skills  Outcome: Ongoing, Progressing     Problem: Functional Ability Impaired (Stroke, Ischemic/Transient Ischemic Attack)  Goal: Optimal Functional Ability  Outcome: Ongoing, Progressing     Problem: Respiratory Compromise (Stroke, Ischemic/Transient Ischemic Attack)  Goal: Effective Oxygenation and Ventilation  Outcome: Ongoing, Progressing     Problem: Sensorimotor Impairment (Stroke, Ischemic/Transient Ischemic Attack)  Goal: Improved Sensorimotor Function  Outcome: Ongoing, Progressing     Problem: Swallowing Impairment (Stroke, Ischemic/Transient Ischemic Attack)  Goal: Optimal Eating and Swallowing without Aspiration  Outcome: Ongoing, Progressing     Problem: Urinary Elimination Impaired (Stroke, Ischemic/Transient Ischemic Attack)  Goal: Effective Urinary Elimination  Outcome: Ongoing, Progressing

## 2022-10-20 NOTE — PROGRESS NOTES
[facility]  Hospital Medicine Progress Note        Chief Complaint: Inpatient Follow-up    Subjective  66-year-old female with significant history of recurrent syncope/fall/ataxic gait.  Diagnosed with the cerebellar infarcts likely embolic on aspirin, Eliquis.  Patient awaiting LINQ placement.  She was admitted to our service and was discharged on 10/11 after being diagnosed with CVA.  Patient admitted hospitalist medicine service on 10/17 again with recurrent syncopal/collapse episodes.  During previous hospitalization patient was significantly anemic, she underwent GI evaluation with EGD showing some ulceration at gastrectomy anastomotic site with no active bleeding.  During previous hospitalization therapy services had recommended skilled nursing facility placement, but the patient refused and went home.  Given persistent/recurrent syncope/collapse decided to repeat MRI.  MRI showed evolving CVA-bilateral cerebral/cerebellar.  Neurology consulted given ongoing symptoms.  CT chest, abdomen, pelvis also ordered given significant weight loss and smoking history.  No occult malignancy.  But consistent with pneumonia.  Initiated appropriate antibiotics.  Home anti thrombotics including Eliquis resumed    Interval Hx:   Seen and examined the patient.  Afebrile vitals stable hemodynamics stable.  Denying pain or discomfort.    Objective/physical exam:  General: In no acute distress, afebrile  Chest:  Few scattered wheezes   Heart: S1, S2, no appreciable murmur  Abdomen: Soft, nontender, BS +  MSK: Warm, no lower extremity edema, no clubbing or cyanosis  Neurologic: Alert and oriented x4,     VITAL SIGNS: 24 HRS MIN & MAX LAST   Temp  Min: 97.6 °F (36.4 °C)  Max: 98.4 °F (36.9 °C) 98.4 °F (36.9 °C)   BP  Min: 109/58  Max: 126/62 (!) 109/58   Pulse  Min: 65  Max: 70  68   Resp  Min: 16  Max: 20 16   SpO2  Min: 95 %  Max: 100 % 95 %       Recent Labs   Lab 10/20/22  0341   WBC 12.7*   RBC  3.05*   HGB 8.0*   HCT 27.2*   MCV 89.2   MCH 26.2*   MCHC 29.4*   RDW 17.5*      MPV 10.1           Recent Labs   Lab 10/18/22  0917 10/19/22  0447 10/20/22  0341   NA  --  135* 135*   K  --  4.4 4.6   CO2  --  26 25   BUN  --  15.6 17.4   CREATININE  --  0.63 0.71   CALCIUM  --  8.4 8.4   PH 7.39  --   --    MG  --  1.50*  --    ALBUMIN  --  1.7* 1.7*   ALKPHOS  --  94 78   ALT  --  17 14   AST  --  18 13   BILITOT  --  0.2 0.1            Microbiology Results (last 7 days)       ** No results found for the last 168 hours. **             Scheduled Med:   albuterol-ipratropium  3 mL Nebulization Q6H    amLODIPine  5 mg Oral Daily    apixaban  5 mg Oral BID    ARIPiprazole  10 mg Oral Daily    atorvastatin  40 mg Oral Daily    cyproheptadine  4 mg Oral BID    doxycycline (VIBRAMYCIN) IVPB  100 mg Intravenous Q12H    ferrous sulfate  1 tablet Oral Daily    levoFLOXacin  750 mg Intravenous Q24H    methylPREDNISolone sodium succinate injection  40 mg Intravenous Q12H    metoprolol succinate  50 mg Oral Daily    metronidazole  500 mg Intravenous Q8H    miconazole NITRATE 2 %   Topical (Top) BID    multivitamin  1 tablet Oral Daily    nicotine  1 patch Transdermal Daily    pantoprazole  40 mg Oral BID    sodium chloride 0.9%  10 mL Intravenous Q6H    sucralfate  1 g Oral QID    zinc oxide-cod liver oil   Topical (Top) BID          Assessment/Plan:    Recurrent syncope/collapse  Subacute CVA-bilateral cerebral/cerebellar  Abnormal gait  Right-sided pneumonia-rule out aspiration  Pulmonary cachexia  COPD with acute exacerbation   Acute on chronic hypoxemic/hypercapnic respiratory failure  Essential HTN-accelerated   HLD   Chronic anemia  Gastric ulcer  Chronic tobacco use  Prophylaxis      - neurology recommended Eliquis 5 m,g BID however, I did not see the evidence of afib, will communicate with the neurology for further assistance, will also consult cards for linq placement.   MRI brain was repeated 10/18-no new  stroke, Confirms evolving bilateral cerebral/cerebellar CVAs  Continue home aspirin, Eliquis   Will have to be very cautious with Eliquis given recurrent falls   I consulted Neurology given her recurrent syncope/collapse, await recs  Continue antibiotics for right-sided pneumonia   Patient is on Levaquin, doxycycline and Flagyl given concern for aspiration  - continue Ivfs with 50 cc/hr.   Patient reported unintentional weight loss, CT chest, abdomen/pelvis with C, no malignancy  Continue IV steroids, bronchodilators for COPD exacerbation, clinically improving.  Wean the steroids Supplemental oxygen to keep saturations more than 90%   D-dimer was high, V/Q negative  Check venous ultrasound  Hemoglobin stable, no overt bleeding   Continue PPI, Carafate for history of gastric anastomotic ulcer   Continue other home meds-aripiprazole, statin, iron, Toprol-XL, multivitamin   Nicotine patch   BP accelerated occasionally and therefore  add amlodipine  DVT prophylaxis-on Eliquis    Case management consult for rehab placement    Julián Erwin MD   10/20/2022

## 2022-10-20 NOTE — PLAN OF CARE
Problem: Adult Inpatient Plan of Care  Goal: Plan of Care Review  Outcome: Ongoing, Progressing  Flowsheets (Taken 10/19/2022 1946)  Plan of Care Reviewed With: patient  Goal: Patient-Specific Goal (Individualized)  Outcome: Ongoing, Progressing  Goal: Absence of Hospital-Acquired Illness or Injury  Outcome: Ongoing, Progressing  Intervention: Identify and Manage Fall Risk  Flowsheets (Taken 10/19/2022 1946)  Safety Promotion/Fall Prevention:   assistive device/personal item within reach   Fall Risk signage in place   Fall Risk reviewed with patient/family   lighting adjusted   high risk medications identified   medications reviewed   nonskid shoes/socks when out of bed   side rails raised x 2  Intervention: Prevent Skin Injury  Flowsheets (Taken 10/19/2022 1946)  Body Position: supine  Skin Protection:   adhesive use limited   protective footwear used   tubing/devices free from skin contact  Intervention: Prevent and Manage VTE (Venous Thromboembolism) Risk  Flowsheets (Taken 10/19/2022 1946)  Activity Management: Rolling - L1  Range of Motion: active ROM (range of motion) encouraged  Intervention: Prevent Infection  Flowsheets (Taken 10/19/2022 1946)  Infection Prevention:   hand hygiene promoted   single patient room provided   equipment surfaces disinfected   rest/sleep promoted  Goal: Optimal Comfort and Wellbeing  Outcome: Ongoing, Progressing  Intervention: Monitor Pain and Promote Comfort  Flowsheets (Taken 10/19/2022 1946)  Pain Management Interventions:   care clustered   medication offered   quiet environment facilitated  Intervention: Provide Person-Centered Care  Flowsheets (Taken 10/19/2022 1946)  Trust Relationship/Rapport:   care explained   choices provided   emotional support provided   empathic listening provided   questions answered   thoughts/feelings acknowledged  Goal: Readiness for Transition of Care  Outcome: Ongoing, Progressing     Problem: Adult Inpatient Plan of Care  Goal: Plan of  Care Review  Outcome: Ongoing, Progressing  Flowsheets (Taken 10/19/2022 1946)  Plan of Care Reviewed With: patient  Goal: Patient-Specific Goal (Individualized)  Outcome: Ongoing, Progressing  Goal: Absence of Hospital-Acquired Illness or Injury  Outcome: Ongoing, Progressing  Intervention: Identify and Manage Fall Risk  Flowsheets (Taken 10/19/2022 1946)  Safety Promotion/Fall Prevention:   assistive device/personal item within reach   Fall Risk signage in place   Fall Risk reviewed with patient/family   lighting adjusted   high risk medications identified   medications reviewed   nonskid shoes/socks when out of bed   side rails raised x 2  Intervention: Prevent Skin Injury  Flowsheets (Taken 10/19/2022 1946)  Body Position: supine  Skin Protection:   adhesive use limited   protective footwear used   tubing/devices free from skin contact  Intervention: Prevent and Manage VTE (Venous Thromboembolism) Risk  Flowsheets (Taken 10/19/2022 1946)  Activity Management: Rolling - L1  Range of Motion: active ROM (range of motion) encouraged  Intervention: Prevent Infection  Flowsheets (Taken 10/19/2022 1946)  Infection Prevention:   hand hygiene promoted   single patient room provided   equipment surfaces disinfected   rest/sleep promoted  Goal: Optimal Comfort and Wellbeing  Outcome: Ongoing, Progressing  Intervention: Monitor Pain and Promote Comfort  Flowsheets (Taken 10/19/2022 1946)  Pain Management Interventions:   care clustered   medication offered   quiet environment facilitated  Intervention: Provide Person-Centered Care  Flowsheets (Taken 10/19/2022 1946)  Trust Relationship/Rapport:   care explained   choices provided   emotional support provided   empathic listening provided   questions answered   thoughts/feelings acknowledged  Goal: Readiness for Transition of Care  Outcome: Ongoing, Progressing     Problem: Fluid Imbalance (Pneumonia)  Goal: Fluid Balance  Outcome: Ongoing, Progressing     Problem: Infection  (Pneumonia)  Goal: Resolution of Infection Signs and Symptoms  Outcome: Ongoing, Progressing     Problem: Respiratory Compromise (Pneumonia)  Goal: Effective Oxygenation and Ventilation  Outcome: Ongoing, Progressing     Problem: Infection  Goal: Absence of Infection Signs and Symptoms  Outcome: Ongoing, Progressing  Intervention: Prevent or Manage Infection  Flowsheets (Taken 10/19/2022 1946)  Infection Management: aseptic technique maintained  Isolation Precautions: precautions maintained     Problem: Impaired Wound Healing  Goal: Optimal Wound Healing  Outcome: Ongoing, Progressing  Intervention: Promote Wound Healing  Flowsheets (Taken 10/19/2022 1946)  Sleep/Rest Enhancement:   awakenings minimized   consistent schedule promoted   room darkened  Activity Management: Rolling - L1  Pain Management Interventions:   care clustered   medication offered   quiet environment facilitated     Problem: Fall Injury Risk  Goal: Absence of Fall and Fall-Related Injury  Outcome: Ongoing, Progressing  Intervention: Identify and Manage Contributors  Flowsheets (Taken 10/19/2022 1946)  Self-Care Promotion: BADL personal objects within reach  Medication Review/Management: medications reviewed     Problem: Skin Injury Risk Increased  Goal: Skin Health and Integrity  Intervention: Optimize Skin Protection  Flowsheets (Taken 10/19/2022 1946)  Pressure Reduction Techniques: frequent weight shift encouraged  Pressure Reduction Devices: foam padding utilized  Skin Protection:   adhesive use limited   protective footwear used   tubing/devices free from skin contact  Head of Bed (HOB) Positioning: HOB at 30-45 degrees     Problem: Adjustment to Illness (Stroke, Ischemic/Transient Ischemic Attack)  Goal: Optimal Coping  Outcome: Ongoing, Progressing     Problem: Bowel Elimination Impaired (Stroke, Ischemic/Transient Ischemic Attack)  Goal: Effective Bowel Elimination  Outcome: Ongoing, Progressing     Problem: Cerebral Tissue Perfusion  (Stroke, Ischemic/Transient Ischemic Attack)  Goal: Optimal Cerebral Tissue Perfusion  Outcome: Ongoing, Progressing     Problem: Cognitive Impairment (Stroke, Ischemic/Transient Ischemic Attack)  Goal: Optimal Cognitive Function  Outcome: Ongoing, Progressing     Problem: Communication Impairment (Stroke, Ischemic/Transient Ischemic Attack)  Goal: Improved Communication Skills  Outcome: Ongoing, Progressing     Problem: Functional Ability Impaired (Stroke, Ischemic/Transient Ischemic Attack)  Goal: Optimal Functional Ability  Outcome: Ongoing, Progressing     Problem: Respiratory Compromise (Stroke, Ischemic/Transient Ischemic Attack)  Goal: Effective Oxygenation and Ventilation  Outcome: Ongoing, Progressing     Problem: Sensorimotor Impairment (Stroke, Ischemic/Transient Ischemic Attack)  Goal: Improved Sensorimotor Function  Outcome: Ongoing, Progressing     Problem: Swallowing Impairment (Stroke, Ischemic/Transient Ischemic Attack)  Goal: Optimal Eating and Swallowing without Aspiration  Outcome: Ongoing, Progressing     Problem: Urinary Elimination Impaired (Stroke, Ischemic/Transient Ischemic Attack)  Goal: Effective Urinary Elimination  Outcome: Ongoing, Progressing

## 2022-10-21 LAB
ALBUMIN SERPL-MCNC: 1.9 GM/DL (ref 3.4–4.8)
ALBUMIN/GLOB SERPL: 0.7 RATIO (ref 1.1–2)
ALP SERPL-CCNC: 95 UNIT/L (ref 40–150)
ALT SERPL-CCNC: 19 UNIT/L (ref 0–55)
AST SERPL-CCNC: 26 UNIT/L (ref 5–34)
BASOPHILS # BLD AUTO: 0.04 X10(3)/MCL (ref 0–0.2)
BASOPHILS NFR BLD AUTO: 0.3 %
BILIRUBIN DIRECT+TOT PNL SERPL-MCNC: 0.2 MG/DL
BUN SERPL-MCNC: 20.4 MG/DL (ref 9.8–20.1)
CALCIUM SERPL-MCNC: 8.2 MG/DL (ref 8.4–10.2)
CHLORIDE SERPL-SCNC: 101 MMOL/L (ref 98–107)
CO2 SERPL-SCNC: 24 MMOL/L (ref 23–31)
CREAT SERPL-MCNC: 0.84 MG/DL (ref 0.55–1.02)
EOSINOPHIL # BLD AUTO: 0.01 X10(3)/MCL (ref 0–0.9)
EOSINOPHIL NFR BLD AUTO: 0.1 %
ERYTHROCYTE [DISTWIDTH] IN BLOOD BY AUTOMATED COUNT: 18 % (ref 11.5–17)
GFR SERPLBLD CREATININE-BSD FMLA CKD-EPI: >60 MLS/MIN/1.73/M2
GLOBULIN SER-MCNC: 2.7 GM/DL (ref 2.4–3.5)
GLUCOSE SERPL-MCNC: 218 MG/DL (ref 82–115)
HCT VFR BLD AUTO: 30.4 % (ref 37–47)
HGB BLD-MCNC: 8.8 GM/DL (ref 12–16)
IMM GRANULOCYTES # BLD AUTO: 0.52 X10(3)/MCL (ref 0–0.04)
IMM GRANULOCYTES NFR BLD AUTO: 3.8 %
LYMPHOCYTES # BLD AUTO: 0.63 X10(3)/MCL (ref 0.6–4.6)
LYMPHOCYTES NFR BLD AUTO: 4.6 %
MCH RBC QN AUTO: 26.5 PG (ref 27–31)
MCHC RBC AUTO-ENTMCNC: 28.9 MG/DL (ref 33–36)
MCV RBC AUTO: 91.6 FL (ref 80–94)
MONOCYTES # BLD AUTO: 0.17 X10(3)/MCL (ref 0.1–1.3)
MONOCYTES NFR BLD AUTO: 1.3 %
NEUTROPHILS # BLD AUTO: 12.2 X10(3)/MCL (ref 2.1–9.2)
NEUTROPHILS NFR BLD AUTO: 89.9 %
NRBC BLD AUTO-RTO: 0 %
PLATELET # BLD AUTO: 341 X10(3)/MCL (ref 130–400)
PMV BLD AUTO: 10.2 FL (ref 7.4–10.4)
POTASSIUM SERPL-SCNC: 4.9 MMOL/L (ref 3.5–5.1)
PROT SERPL-MCNC: 4.6 GM/DL (ref 5.8–7.6)
RBC # BLD AUTO: 3.32 X10(6)/MCL (ref 4.2–5.4)
SODIUM SERPL-SCNC: 134 MMOL/L (ref 136–145)
WBC # SPEC AUTO: 13.6 X10(3)/MCL (ref 4.5–11.5)

## 2022-10-21 PROCEDURE — 63600175 PHARM REV CODE 636 W HCPCS: Performed by: INTERNAL MEDICINE

## 2022-10-21 PROCEDURE — 80053 COMPREHEN METABOLIC PANEL: CPT | Performed by: STUDENT IN AN ORGANIZED HEALTH CARE EDUCATION/TRAINING PROGRAM

## 2022-10-21 PROCEDURE — C1764 EVENT RECORDER, CARDIAC: HCPCS | Performed by: INTERNAL MEDICINE

## 2022-10-21 PROCEDURE — 25000003 PHARM REV CODE 250: Performed by: INTERNAL MEDICINE

## 2022-10-21 PROCEDURE — S0030 INJECTION, METRONIDAZOLE: HCPCS | Performed by: INTERNAL MEDICINE

## 2022-10-21 PROCEDURE — 36415 COLL VENOUS BLD VENIPUNCTURE: CPT | Performed by: STUDENT IN AN ORGANIZED HEALTH CARE EDUCATION/TRAINING PROGRAM

## 2022-10-21 PROCEDURE — S4991 NICOTINE PATCH NONLEGEND: HCPCS | Performed by: INTERNAL MEDICINE

## 2022-10-21 PROCEDURE — 25000242 PHARM REV CODE 250 ALT 637 W/ HCPCS: Performed by: INTERNAL MEDICINE

## 2022-10-21 PROCEDURE — 94761 N-INVAS EAR/PLS OXIMETRY MLT: CPT

## 2022-10-21 PROCEDURE — 25000003 PHARM REV CODE 250: Performed by: NURSE PRACTITIONER

## 2022-10-21 PROCEDURE — 63600175 PHARM REV CODE 636 W HCPCS: Performed by: STUDENT IN AN ORGANIZED HEALTH CARE EDUCATION/TRAINING PROGRAM

## 2022-10-21 PROCEDURE — 33285 INSJ SUBQ CAR RHYTHM MNTR: CPT | Performed by: INTERNAL MEDICINE

## 2022-10-21 PROCEDURE — A4216 STERILE WATER/SALINE, 10 ML: HCPCS | Performed by: INTERNAL MEDICINE

## 2022-10-21 PROCEDURE — 85025 COMPLETE CBC W/AUTO DIFF WBC: CPT | Performed by: STUDENT IN AN ORGANIZED HEALTH CARE EDUCATION/TRAINING PROGRAM

## 2022-10-21 PROCEDURE — 21400001 HC TELEMETRY ROOM

## 2022-10-21 PROCEDURE — 94640 AIRWAY INHALATION TREATMENT: CPT

## 2022-10-21 DEVICE — SYS LINQ II MON INSRTBL CARD: Type: IMPLANTABLE DEVICE | Site: CHEST | Status: FUNCTIONAL

## 2022-10-21 RX ORDER — LEVOFLOXACIN 5 MG/ML
500 INJECTION, SOLUTION INTRAVENOUS
Status: COMPLETED | OUTPATIENT
Start: 2022-10-22 | End: 2022-10-23

## 2022-10-21 RX ORDER — LIDOCAINE HYDROCHLORIDE 10 MG/ML
INJECTION, SOLUTION EPIDURAL; INFILTRATION; INTRACAUDAL; PERINEURAL
Status: DISCONTINUED | OUTPATIENT
Start: 2022-10-21 | End: 2022-10-27

## 2022-10-21 RX ORDER — PREDNISONE 20 MG/1
20 TABLET ORAL 2 TIMES DAILY
Status: DISCONTINUED | OUTPATIENT
Start: 2022-10-21 | End: 2022-10-21

## 2022-10-21 RX ADMIN — METRONIDAZOLE 500 MG: 5 INJECTION, SOLUTION INTRAVENOUS at 06:10

## 2022-10-21 RX ADMIN — ATORVASTATIN CALCIUM 40 MG: 40 TABLET, FILM COATED ORAL at 09:10

## 2022-10-21 RX ADMIN — MICONAZOLE NITRATE 2 % TOPICAL POWDER: at 09:10

## 2022-10-21 RX ADMIN — TRAMADOL HYDROCHLORIDE 50 MG: 50 TABLET, COATED ORAL at 10:10

## 2022-10-21 RX ADMIN — TRAMADOL HYDROCHLORIDE 50 MG: 50 TABLET, COATED ORAL at 04:10

## 2022-10-21 RX ADMIN — SUCRALFATE 1 G: 1 TABLET ORAL at 09:10

## 2022-10-21 RX ADMIN — APIXABAN 5 MG: 5 TABLET, FILM COATED ORAL at 09:10

## 2022-10-21 RX ADMIN — IPRATROPIUM BROMIDE AND ALBUTEROL SULFATE 3 ML: 2.5; .5 SOLUTION RESPIRATORY (INHALATION) at 07:10

## 2022-10-21 RX ADMIN — Medication: at 09:10

## 2022-10-21 RX ADMIN — DOXYCYCLINE 100 MG: 100 INJECTION, POWDER, LYOPHILIZED, FOR SOLUTION INTRAVENOUS at 09:10

## 2022-10-21 RX ADMIN — SODIUM CHLORIDE, POTASSIUM CHLORIDE, SODIUM LACTATE AND CALCIUM CHLORIDE: 600; 310; 30; 20 INJECTION, SOLUTION INTRAVENOUS at 05:10

## 2022-10-21 RX ADMIN — FERROUS SULFATE TAB 325 MG (65 MG ELEMENTAL FE) 1 EACH: 325 (65 FE) TAB at 09:10

## 2022-10-21 RX ADMIN — SODIUM CHLORIDE, PRESERVATIVE FREE 10 ML: 5 INJECTION INTRAVENOUS at 12:10

## 2022-10-21 RX ADMIN — PANTOPRAZOLE SODIUM 40 MG: 40 TABLET, DELAYED RELEASE ORAL at 09:10

## 2022-10-21 RX ADMIN — ARIPIPRAZOLE 10 MG: 5 TABLET ORAL at 09:10

## 2022-10-21 RX ADMIN — IPRATROPIUM BROMIDE AND ALBUTEROL SULFATE 3 ML: 2.5; .5 SOLUTION RESPIRATORY (INHALATION) at 12:10

## 2022-10-21 RX ADMIN — TRAMADOL HYDROCHLORIDE 50 MG: 50 TABLET, COATED ORAL at 03:10

## 2022-10-21 RX ADMIN — SODIUM CHLORIDE, PRESERVATIVE FREE 10 ML: 5 INJECTION INTRAVENOUS at 05:10

## 2022-10-21 RX ADMIN — TRAZODONE HYDROCHLORIDE 25 MG: 50 TABLET ORAL at 09:10

## 2022-10-21 RX ADMIN — NICOTINE 1 PATCH: 14 PATCH TRANSDERMAL at 09:10

## 2022-10-21 RX ADMIN — THERA TABS 1 TABLET: TAB at 09:10

## 2022-10-21 RX ADMIN — SUCRALFATE 1 G: 1 TABLET ORAL at 12:10

## 2022-10-21 RX ADMIN — METRONIDAZOLE 500 MG: 5 INJECTION, SOLUTION INTRAVENOUS at 11:10

## 2022-10-21 RX ADMIN — CYPROHEPTADINE HYDROCHLORIDE 4 MG: 4 TABLET ORAL at 09:10

## 2022-10-21 RX ADMIN — METOPROLOL SUCCINATE 50 MG: 50 TABLET, FILM COATED, EXTENDED RELEASE ORAL at 10:10

## 2022-10-21 RX ADMIN — LEVOFLOXACIN 750 MG: 750 INJECTION, SOLUTION INTRAVENOUS at 12:10

## 2022-10-21 RX ADMIN — METRONIDAZOLE 500 MG: 5 INJECTION, SOLUTION INTRAVENOUS at 04:10

## 2022-10-21 RX ADMIN — AMLODIPINE BESYLATE 5 MG: 5 TABLET ORAL at 09:10

## 2022-10-21 RX ADMIN — METHYLPREDNISOLONE SODIUM SUCCINATE 40 MG: 40 INJECTION, POWDER, FOR SOLUTION INTRAMUSCULAR; INTRAVENOUS at 05:10

## 2022-10-21 RX ADMIN — SUCRALFATE 1 G: 1 TABLET ORAL at 04:10

## 2022-10-21 RX ADMIN — SODIUM CHLORIDE, PRESERVATIVE FREE 10 ML: 5 INJECTION INTRAVENOUS at 06:10

## 2022-10-21 NOTE — PLAN OF CARE
Problem: Adult Inpatient Plan of Care  Goal: Plan of Care Review  Outcome: Ongoing, Progressing  Flowsheets (Taken 10/20/2022 1924)  Plan of Care Reviewed With: patient  Goal: Patient-Specific Goal (Individualized)  Outcome: Ongoing, Progressing  Goal: Absence of Hospital-Acquired Illness or Injury  Outcome: Ongoing, Progressing  Intervention: Identify and Manage Fall Risk  Flowsheets (Taken 10/20/2022 1924)  Safety Promotion/Fall Prevention:   assistive device/personal item within reach   bedside commode chair   Fall Risk reviewed with patient/family   Fall Risk signage in place   high risk medications identified   lighting adjusted   medications reviewed   nonskid shoes/socks when out of bed   side rails raised x 2   instructed to call staff for mobility  Intervention: Prevent Skin Injury  Flowsheets (Taken 10/20/2022 1924)  Body Position:   supine   upper extremity elevated  Skin Protection:   adhesive use limited   incontinence pads utilized   protective footwear used   tubing/devices free from skin contact  Intervention: Prevent and Manage VTE (Venous Thromboembolism) Risk  Flowsheets (Taken 10/20/2022 1924)  VTE Prevention/Management:   remove, assess skin, and reapply sequential compression device   ambulation promoted   fluids promoted   intravenous hydration  Range of Motion: ROM (range of motion) performed  Intervention: Prevent Infection  Flowsheets (Taken 10/20/2022 1924)  Infection Prevention:   hand hygiene promoted   single patient room provided   equipment surfaces disinfected  Goal: Optimal Comfort and Wellbeing  Outcome: Ongoing, Progressing  Intervention: Monitor Pain and Promote Comfort  Flowsheets (Taken 10/20/2022 1924)  Pain Management Interventions:   care clustered   medication offered   pain management plan reviewed with patient/caregiver   position adjusted   quiet environment facilitated  Intervention: Provide Person-Centered Care  Flowsheets (Taken 10/20/2022 1924)  Trust  Relationship/Rapport:   care explained   choices provided   emotional support provided   empathic listening provided   questions answered   thoughts/feelings acknowledged  Goal: Readiness for Transition of Care  Outcome: Ongoing, Progressing     Problem: Fluid Imbalance (Pneumonia)  Goal: Fluid Balance  Outcome: Ongoing, Progressing     Problem: Infection (Pneumonia)  Goal: Resolution of Infection Signs and Symptoms  Outcome: Ongoing, Progressing     Problem: Respiratory Compromise (Pneumonia)  Goal: Effective Oxygenation and Ventilation  Outcome: Ongoing, Progressing     Problem: Infection  Goal: Absence of Infection Signs and Symptoms  Outcome: Ongoing, Progressing  Intervention: Prevent or Manage Infection  Flowsheets (Taken 10/20/2022 1924)  Infection Management: aseptic technique maintained  Isolation Precautions: precautions maintained     Problem: Impaired Wound Healing  Goal: Optimal Wound Healing  Outcome: Ongoing, Progressing  Intervention: Promote Wound Healing  Flowsheets (Taken 10/20/2022 1924)  Sleep/Rest Enhancement:   awakenings minimized   consistent schedule promoted   reading promoted   regular sleep/rest pattern promoted   room darkened  Pain Management Interventions:   care clustered   medication offered   pain management plan reviewed with patient/caregiver   position adjusted   quiet environment facilitated     Problem: Fall Injury Risk  Goal: Absence of Fall and Fall-Related Injury  Outcome: Ongoing, Progressing  Intervention: Identify and Manage Contributors  Flowsheets (Taken 10/20/2022 1924)  Self-Care Promotion:   BADL personal objects within reach   independence encouraged  Medication Review/Management:   medications reviewed   high-risk medications identified     Problem: Skin Injury Risk Increased  Goal: Skin Health and Integrity  Outcome: Ongoing, Progressing     Problem: Adjustment to Illness (Stroke, Ischemic/Transient Ischemic Attack)  Goal: Optimal Coping  Outcome: Ongoing,  Progressing     Problem: Bowel Elimination Impaired (Stroke, Ischemic/Transient Ischemic Attack)  Goal: Effective Bowel Elimination  Outcome: Ongoing, Progressing     Problem: Cerebral Tissue Perfusion (Stroke, Ischemic/Transient Ischemic Attack)  Goal: Optimal Cerebral Tissue Perfusion  Outcome: Ongoing, Progressing     Problem: Cognitive Impairment (Stroke, Ischemic/Transient Ischemic Attack)  Goal: Optimal Cognitive Function  Outcome: Ongoing, Progressing     Problem: Communication Impairment (Stroke, Ischemic/Transient Ischemic Attack)  Goal: Improved Communication Skills  Outcome: Ongoing, Progressing     Problem: Functional Ability Impaired (Stroke, Ischemic/Transient Ischemic Attack)  Goal: Optimal Functional Ability  Outcome: Ongoing, Progressing     Problem: Respiratory Compromise (Stroke, Ischemic/Transient Ischemic Attack)  Goal: Effective Oxygenation and Ventilation  Outcome: Ongoing, Progressing     Problem: Sensorimotor Impairment (Stroke, Ischemic/Transient Ischemic Attack)  Goal: Improved Sensorimotor Function  Outcome: Ongoing, Progressing     Problem: Swallowing Impairment (Stroke, Ischemic/Transient Ischemic Attack)  Goal: Optimal Eating and Swallowing without Aspiration  Outcome: Ongoing, Progressing     Problem: Urinary Elimination Impaired (Stroke, Ischemic/Transient Ischemic Attack)  Goal: Effective Urinary Elimination  Outcome: Ongoing, Progressing

## 2022-10-21 NOTE — PT/OT/SLP PROGRESS
Attempted to see pt again this afternoon for PT tx. Pt declined tx session and stated she was very tired. Pt states she has sat [facility] for a while as well as amb around room with staff. Pt declined to have therapy services over the weekend and states she wants PT to f/u with her on Monday. Therapist got new linen for pt 2/2 hers being soiled with old blood. PTA present from [phone].

## 2022-10-21 NOTE — PLAN OF CARE
Problem: Adult Inpatient Plan of Care  Goal: Plan of Care Review  Outcome: Ongoing, Progressing  Goal: Patient-Specific Goal (Individualized)  Outcome: Ongoing, Progressing  Goal: Absence of Hospital-Acquired Illness or Injury  Outcome: Ongoing, Progressing  Intervention: Identify and Manage Fall Risk  Flowsheets (Taken 10/21/2022 0724)  Safety Promotion/Fall Prevention:   assistive device/personal item within reach   side rails raised x 2   Fall Risk reviewed with patient/family   nonskid shoes/socks when out of bed  Intervention: Prevent Skin Injury  Flowsheets (Taken 10/21/2022 0724)  Body Position:   left   side-lying  Intervention: Prevent and Manage VTE (Venous Thromboembolism) Risk  Flowsheets (Taken 10/21/2022 0724)  Activity Management: Rolling - L1  VTE Prevention/Management:   remove, assess skin, and reapply sequential compression device   fluids promoted   intravenous hydration   bleeding precations maintained  Intervention: Prevent Infection  Flowsheets (Taken 10/21/2022 0724)  Infection Prevention:   hand hygiene promoted   single patient room provided   rest/sleep promoted  Goal: Optimal Comfort and Wellbeing  Outcome: Ongoing, Progressing  Goal: Readiness for Transition of Care  Outcome: Ongoing, Progressing     Problem: Fluid Imbalance (Pneumonia)  Goal: Fluid Balance  Outcome: Ongoing, Progressing     Problem: Infection (Pneumonia)  Goal: Resolution of Infection Signs and Symptoms  Outcome: Ongoing, Progressing  Intervention: Prevent Infection Progression  Flowsheets (Taken 10/21/2022 0724)  Infection Management: aseptic technique maintained  Isolation Precautions: precautions maintained     Problem: Respiratory Compromise (Pneumonia)  Goal: Effective Oxygenation and Ventilation  Outcome: Ongoing, Progressing     Problem: Infection  Goal: Absence of Infection Signs and Symptoms  Outcome: Ongoing, Progressing     Problem: Impaired Wound Healing  Goal: Optimal Wound Healing  Outcome: Ongoing,  Progressing     Problem: Fall Injury Risk  Goal: Absence of Fall and Fall-Related Injury  Outcome: Ongoing, Progressing     Problem: Skin Injury Risk Increased  Goal: Skin Health and Integrity  Outcome: Ongoing, Progressing     Problem: Adjustment to Illness (Stroke, Ischemic/Transient Ischemic Attack)  Goal: Optimal Coping  Outcome: Ongoing, Progressing     Problem: Bowel Elimination Impaired (Stroke, Ischemic/Transient Ischemic Attack)  Goal: Effective Bowel Elimination  Outcome: Ongoing, Progressing     Problem: Cerebral Tissue Perfusion (Stroke, Ischemic/Transient Ischemic Attack)  Goal: Optimal Cerebral Tissue Perfusion  Outcome: Ongoing, Progressing     Problem: Cognitive Impairment (Stroke, Ischemic/Transient Ischemic Attack)  Goal: Optimal Cognitive Function  Outcome: Ongoing, Progressing     Problem: Communication Impairment (Stroke, Ischemic/Transient Ischemic Attack)  Goal: Improved Communication Skills  Outcome: Ongoing, Progressing     Problem: Functional Ability Impaired (Stroke, Ischemic/Transient Ischemic Attack)  Goal: Optimal Functional Ability  Outcome: Ongoing, Progressing     Problem: Respiratory Compromise (Stroke, Ischemic/Transient Ischemic Attack)  Goal: Effective Oxygenation and Ventilation  Outcome: Ongoing, Progressing     Problem: Sensorimotor Impairment (Stroke, Ischemic/Transient Ischemic Attack)  Goal: Improved Sensorimotor Function  Outcome: Ongoing, Progressing     Problem: Swallowing Impairment (Stroke, Ischemic/Transient Ischemic Attack)  Goal: Optimal Eating and Swallowing without Aspiration  Outcome: Ongoing, Progressing     Problem: Urinary Elimination Impaired (Stroke, Ischemic/Transient Ischemic Attack)  Goal: Effective Urinary Elimination  Outcome: Ongoing, Progressing

## 2022-10-21 NOTE — PROGRESS NOTES
[facility]  Hospital Medicine Progress Note        Chief Complaint: Inpatient Follow-up    Subjective  66-year-old female with significant history of recurrent syncope/fall/ataxic gait.  Diagnosed with the cerebellar infarcts likely embolic on aspirin, Eliquis.  Patient awaiting LINQ placement.  She was admitted to our service and was discharged on 10/11 after being diagnosed with CVA.  Patient admitted hospitalist medicine service on 10/17 again with recurrent syncopal/collapse episodes.  During previous hospitalization patient was significantly anemic, she underwent GI evaluation with EGD showing some ulceration at gastrectomy anastomotic site with no active bleeding.  During previous hospitalization therapy services had recommended skilled nursing facility placement, but the patient refused and went home.  Given persistent/recurrent syncope/collapse decided to repeat MRI.  MRI showed evolving CVA-bilateral cerebral/cerebellar.  Neurology consulted given ongoing symptoms.  CT chest, abdomen, pelvis also ordered given significant weight loss and smoking history.  No occult malignancy.  But consistent with pneumonia.  Initiated appropriate antibiotics.  Home anti thrombotics including Eliquis resumed    Interval Hx:   Seen and examined the patient afebrile vitals are stable denied pain or discomfort/     Objective/physical exam:  General: In no acute distress, afebrile  Chest:  Few scattered wheezes   Heart: S1, S2, no appreciable murmur  Abdomen: Soft, nontender, BS +  MSK: Warm, no lower extremity edema, no clubbing or cyanosis  Neurologic: Alert and oriented x4,     VITAL SIGNS: 24 HRS MIN & MAX LAST   Temp  Min: 97.8 °F (36.6 °C)  Max: 98.6 °F (37 °C) 97.9 °F (36.6 °C)   BP  Min: 104/60  Max: 125/71 115/68   Pulse  Min: 68  Max: 83  79   Resp  Min: 16  Max: 20 18   SpO2  Min: 92 %  Max: 100 % 95 %       Recent Labs   Lab 10/21/22  1054   WBC 13.6*   RBC 3.32*   HGB 8.8*   HCT 30.4*  Pt admitted to room S606 via bed. Vitals stable, pt denies pain, call light in reach, oriented pt and family to room, bed alarm on. No questions or concerns at this time.    4 Eyes Skin Assessment Completed by FLORES Thomas and FLORES Hatch.    Head Redness to left eye  Ears WDL  Nose WDL  Mouth Discoloration left lower lip blood blister  Neck WDL  Breast/Chest WDL  Shoulder Blades WDL  Spine WDL  (R) Arm/Elbow/Hand WDL  (L) Arm/Elbow/Hand WDL  Abdomen WDL  Groin WDL  Scrotum/Coccyx/Buttocks WDL  (R) Leg WDL  (L) Leg Redness, Non-Blanching and Edema  (R) Heel/Foot/Toe WDL  (L) Heel/Foot/Toe WDL          Devices In Places Compression Dressing      Interventions In Place N/A    Possible Skin Injury Yes    Pictures Uploaded Into Epic Yes  Wound Consult Placed N/A Limb prevention consulted  RN Wound Prevention Protocol Ordered No      MCV 91.6   MCH 26.5*   MCHC 28.9*   RDW 18.0*      MPV 10.2           Recent Labs   Lab 10/18/22  0917 10/19/22  0447 10/20/22  0341 10/21/22  1054   NA  --  135*   < > 134*   K  --  4.4   < > 4.9   CO2  --  26   < > 24   BUN  --  15.6   < > 20.4*   CREATININE  --  0.63   < > 0.84   CALCIUM  --  8.4   < > 8.2*   PH 7.39  --   --   --    MG  --  1.50*  --   --    ALBUMIN  --  1.7*   < > 1.9*   ALKPHOS  --  94   < > 95   ALT  --  17   < > 19   AST  --  18   < > 26   BILITOT  --  0.2   < > 0.2    < > = values in this interval not displayed.            Microbiology Results (last 7 days)       ** No results found for the last 168 hours. **             Scheduled Med:   albuterol-ipratropium  3 mL Nebulization Q6H    amLODIPine  5 mg Oral Daily    apixaban  5 mg Oral BID    ARIPiprazole  10 mg Oral Daily    atorvastatin  40 mg Oral Daily    cyproheptadine  4 mg Oral BID    doxycycline (VIBRAMYCIN) IVPB  100 mg Intravenous Q12H    ferrous sulfate  1 tablet Oral Daily    levoFLOXacin  750 mg Intravenous Q24H    methylPREDNISolone sodium succinate injection  40 mg Intravenous Q24H    metoprolol succinate  50 mg Oral Daily    metronidazole  500 mg Intravenous Q8H    miconazole NITRATE 2 %   Topical (Top) BID    multivitamin  1 tablet Oral Daily    nicotine  1 patch Transdermal Daily    pantoprazole  40 mg Oral BID    sodium chloride 0.9%  10 mL Intravenous Q6H    sucralfate  1 g Oral QID    zinc oxide-cod liver oil   Topical (Top) BID          Assessment/Plan:  Recurrent syncope/collapse  Subacute CVA-bilateral cerebral/cerebellar  Abnormal gait  Right-sided pneumonia-rule out aspiration  Pulmonary cachexia  COPD with acute exacerbation   Acute on chronic hypoxemic/hypercapnic respiratory failure  Essential HTN-accelerated   HLD   Chronic anemia  Gastric ulcer  Chronic tobacco use  Prophylaxis      -  discussed with neurology they are recommending Eliquis 5 mg b.I.d. at this time due to distribution of the stroke  suggesting embolic and suspicion of Afib cardiac reasons are very high, patient will also get leg placement for definitive diagnoses.  -   DC aspirin since no history of stent.  -   Discussed with the patient about fall risk and placement.  MRI brain was repeated 10/18-no new stroke, Confirms evolving bilateral cerebral/cerebellar CVAs  Continue antibiotics for right-sided pneumonia   Patient is on Levaquin, doxycycline and Flagyl given concern for aspiration,   Continue for 5 days.  -   Discontinue IV fluids.  Patient reported unintentional weight loss, CT chest, abdomen/pelvis with C, no malignancy    We will discontinue steroids continue bronchodilators, she looks very comfortable and there is no wheezing.  Supplemental oxygen to keep saturations more than 90%     Although D-dimer was elevated V/Q scan and bilateral lower extremity Doppler ultrasound is negative.  It was most likely due to inflammatory process.  Continue PPI, Carafate for history of gastric anastomotic ulcer   Patient will need Protonix daily since she is on Eliquis now.  Continue other home meds-aripiprazole, statin, iron, Toprol-XL, multivitamin   Nicotine patch   BP accelerated occasionally and therefore  add amlodipine  DVT prophylaxis-on Eliquis    Case management consult for rehab placement    Julián Erwin MD   10/21/2022

## 2022-10-21 NOTE — CONSULTS
Inpatient consult to Cardiology  Consult performed by: Curtis Petersen [facility]  Consult ordered by: Julián Erwin MD  Reason for consult: LINQ      [facility] - Oncology Acute  Cardiology  Consult Note    Patient Name: Nimo Dill  MRN: [id number]  Admission Date: 10/17/2022  Hospital Length of Stay: 4 days  Code Status: Full Code   Attending Provider: Kvng Dailey MD   [facility]  Primary Care Physician: EILEEN Figueroa  Principal Problem:<principal problem not specified>    Patient information was obtained from patient, past medical records, and ER records.     Subjective:     Chief Complaint:  Consulted for LINQ     HPI:   Ms. Dill is a 65 y/o female who is known to [facility] as an inpatient only.  She presented to [facility] on 10/17/2022 following a syncopal episode just prior to arrival.  She reported posterior scalp pain and did have hematoma in that area.  Patient stated she was walking and the next thing she remembers she was on the ground.  Of note she was recently admitted for similar symptoms.  Her workup then revealed UTI community-acquired pneumonia as well as old and new bilateral cerebellar infarcts likely embolic therefore she was started on Eliquis.  [facility] has been consulted for LINQ placement.     PMH: Common Bile Duct Dilatation, Gastric Ulcer Hemorrhage/Perforation, PUD  PSH: Appendectomy, , Cholecystectomy, Gastrectomy, Hysterectomy, Rectovaginal Fistula Repair, Tonsillectomy, Vagotomy and Pyloroplasty  Family History: Father, L, HTN, HLD; Mother, L, Healthy  Social History: Denies Illicit Drug and ETOH Use; + Tobacco Use; 1 ppd for 40+ Years     Previous Cardiac Diagnostics:   BLE Venous NIVA 10.20.22  There was no evidence of deep or superficial vein thrombosis in the bilateral lower extremities.     Echo 10.18.22  There is no evidence of intracardiac shunting.  Normal systolic function.  The estimated ejection fraction is  55%.    ECHO 10.7.22:  The estimated ejection fraction is 55-60%.  Normal systolic function.  Normal left ventricular diastolic function.  Moderate mitral regurgitation.  Mild tricuspid regurgitation.  Normal central venous pressure (3 mmHg).  The estimated PA systolic pressure is 35 mmHg.     Carotid US 10.6.22:  The right internal carotid artery demonstrated no hemodynamically significant stenosis.  The left internal carotid artery demonstrated no hemodynamically significant stenosis.   Bilateral vertebral arteries were patent with antegrade flow.        Review of patient's allergies indicates:   Allergen Reactions    Penicillins Hives and Blisters       No current facility-administered medications on file prior to encounter.     Current Outpatient Medications on File Prior to Encounter   Medication Sig    alprazolam (XANAX) 0.5 MG tablet Take 0.5 mg by mouth 3 (three) times daily as needed.     apixaban (ELIQUIS) 5 mg Tab Take 1 tablet (5 mg total) by mouth 2 (two) times daily.    ARIPiprazole (ABILIFY) 10 MG Tab Take 10 mg by mouth once daily.    aspirin 81 MG Chew Take 1 tablet (81 mg total) by mouth once daily.    atorvastatin (LIPITOR) 40 MG tablet Take 1 tablet (40 mg total) by mouth once daily.    ferrous sulfate 325 (65 FE) MG EC tablet Take 1 tablet (325 mg total) by mouth once daily.    metoprolol succinate (TOPROL-XL) 50 MG 24 hr tablet Take 1 tablet (50 mg total) by mouth once daily.    pantoprazole (PROTONIX) 40 MG tablet Take 1 tablet (40 mg total) by mouth 2 (two) times daily.    paroxetine (PAXIL) 40 MG tablet Take 40 mg by mouth once daily.    zolpidem (AMBIEN) 10 mg Tab Take 10 mg by mouth nightly as needed.    cyproheptadine (PERIACTIN) 4 mg tablet     HYDROcodone-acetaminophen (NORCO) 5-325 mg per tablet Take 1 tablet by mouth every 6 (six) hours as needed.    ketoconazole (NIZORAL) 2 % cream Apply topically once daily. for 7 days    multivitamin Tab Take 1 tablet by mouth once daily.     sucralfate (CARAFATE) 1 gram tablet Take 1 tablet (1 g total) by mouth 4 (four) times daily.       Review of Systems:  Review of Systems   Constitutional: Negative.    HENT: Negative.     Eyes: Negative.    Respiratory: Negative.     Cardiovascular: Negative.    Gastrointestinal: Negative.    Endocrine: Negative.    Genitourinary: Negative.    Musculoskeletal: Negative.    Skin: Negative.    Allergic/Immunologic: Negative.    Neurological:  Positive for syncope.   Hematological: Negative.    Psychiatric/Behavioral: Negative.       Objective:     Vital Signs (Most Recent):  Temp: 97.8 °F (36.6 °C) (10/21/22 0429)  Pulse: 83 (10/21/22 0429)  Resp: 18 (10/21/22 0429)  BP: 123/70 (10/21/22 0429)  SpO2: 96 % (10/21/22 0429) Vital Signs (24h Range):  Temp:  [97.8 °F (36.6 °C)-98.6 °F (37 °C)] 97.8 °F (36.6 °C)  Pulse:  [68-83] 83  Resp:  [16-20] 18  SpO2:  [92 %-100 %] 96 %  BP: (109-125)/(58-71) 123/70     Weight: 46.2 kg (101 lb 13.6 oz)  Body mass index is 17.48 kg/m².    SpO2: 96 %  O2 Device (Oxygen Therapy): room air      Intake/Output Summary (Last 24 hours) at 10/21/2022 0909  Last data filed at 10/21/2022 0148  Gross per 24 hour   Intake 1180 ml   Output --   Net 1180 ml       Lines/Drains/Airways       Peripheral Intravenous Line  Duration                  Midline Catheter Insertion/Assessment  - Single Lumen 10/18/22 2130 Right basilic vein (medial side of arm) other (see comments) 2 days                      Significant Labs: CMP   Recent Labs   Lab 10/20/22  0341   *   K 4.6   CO2 25   BUN 17.4   CREATININE 0.71   CALCIUM 8.4   ALBUMIN 1.7*   BILITOT 0.1   ALKPHOS 78   AST 13   ALT 14    and CBC   Recent Labs   Lab 10/20/22  0341   WBC 12.7*   HGB 8.0*   HCT 27.2*            Significant Imaging:   Imaging Results              CT Cervical Spine Without Contrast (Final result)  Result time 10/17/22 13:31:57      Final result by Cheikh French MD (10/17/22 13:31:57)                    Impression:      Loss of the normal lordotic curve of the cervical spine most likely related to spasm but otherwise unremarkable with no evidence of acute fracture or dislocation seen      Electronically signed by: Cheikh French  Date:    10/17/2022  Time:    13:31               Narrative:    EXAMINATION:  CT CERVICAL SPINE WITHOUT CONTRAST    CLINICAL HISTORY:  trauma;    TECHNIQUE:  Low dose axial images, sagittal and coronal reformations were performed though the cervical spine.  Contrast was not administered. Automatic exposure control is utilized to reduce patient radiation exposure.    COMPARISON:  None    FINDINGS:  The vertebral body heights are well maintained. There is some loss of the normal lordotic curve cervical spine most likely related to spasm. No fracture is seen. No dislocation is seen. The odontoid and lateral masses appear grossly unremarkable.  There is some pleural thickening in the right upper hemithorax                                       CT Head Without Contrast (Final result)  Result time 10/17/22 13:27:02      Final result by Cheikh French MD (10/17/22 13:27:02)                   Impression:      Cephalhematoma and soft tissues swelling seen in the scalp in the posterior parietal region      Electronically signed by: Cheikh French  Date:    10/17/2022  Time:    13:27               Narrative:    EXAMINATION:  CT HEAD WITHOUT CONTRAST    CLINICAL HISTORY:  Trauma, fall, hit head;    TECHNIQUE:  Multiple axial images were obtained from the base of the brain to the vertex without contrast administration.  Sagittal and coronal reconstructions were performed. .Automatic exposure control  (AEC) is utilized to reduce patient radiation exposure.    COMPARISON:  10/09/2022    FINDINGS:  There is no intracranial mass or lesion seen.  No hemorrhage is seen.  No infarct is seen.  The ventricles and basilar cisterns appear normal.  Brain parenchyma appears grossly  unremarkable.    Posterior fossa appears normal.  The calvarium is intact.  There is soft tissue swelling in the cephalohematoma in the right posterior parietal region the paranasal sinuses appear grossly unremarkable.                                       X-Ray Pelvis Routine AP (Final result)  Result time 10/17/22 14:29:30      Final result by Seth Orellana MD (10/17/22 14:29:30)                   Impression:      Degenerative changes      Electronically signed by: Seth Orellana  Date:    10/17/2022  Time:    14:29               Narrative:    EXAMINATION:  XR PELVIS ROUTINE AP    CLINICAL HISTORY:  Unspecified fall, initial encounter    COMPARISON:  None.    FINDINGS:  Hardware is identified in relation to the right femur with screw and intramedullary heidi    There is narrowing of the inferior medial aspect of both hip joints with degenerative changes of the lumbosacral spine articular spaces otherwise preserved with smooth articular surfaces    No blastic or lytic lesions.    Soft tissues within normal limits.                                       X-Ray Chest AP Portable (Final result)  Result time 10/17/22 13:16:19      Final result by Link Crane MD (10/17/22 13:16:19)                   Impression:      No acute findings.  Stable chest radiograph.      Electronically signed by: Link Crane  Date:    10/17/2022  Time:    13:16               Narrative:    EXAMINATION:  XR CHEST AP PORTABLE    CLINICAL HISTORY:  fall;    COMPARISON:  6 October 2022    FINDINGS:  Portable frontal view of the chest was obtained. The heart is not significantly enlarged.  There is aortic atherosclerosis.  Similar irregular opacities are seen right mid lung and bilateral lung bases.  No new focal consolidation.  No pneumothorax.                                        EKG:        Telemetry:  SR    Physical Exam:  Physical Exam  Constitutional:       Appearance: Normal appearance.   HENT:      Head: Atraumatic.   Eyes:       Extraocular Movements: Extraocular movements intact.      Conjunctiva/sclera: Conjunctivae normal.   Cardiovascular:      Rate and Rhythm: Normal rate and regular rhythm.      Pulses: Normal pulses.      Heart sounds: Normal heart sounds.   Pulmonary:      Effort: Pulmonary effort is normal.      Breath sounds: Normal breath sounds.   Abdominal:      Palpations: Abdomen is soft.   Musculoskeletal:         General: Normal range of motion.      Cervical back: Neck supple.   Skin:     General: Skin is warm and dry.   Neurological:      Mental Status: She is alert and oriented to person, place, and time.   Psychiatric:         Mood and Affect: Mood normal.         Behavior: Behavior normal.       Home Medications:   No current facility-administered medications on file prior to encounter.     Current Outpatient Medications on File Prior to Encounter   Medication Sig Dispense Refill    alprazolam (XANAX) 0.5 MG tablet Take 0.5 mg by mouth 3 (three) times daily as needed.       apixaban (ELIQUIS) 5 mg Tab Take 1 tablet (5 mg total) by mouth 2 (two) times daily. 60 tablet 2    ARIPiprazole (ABILIFY) 10 MG Tab Take 10 mg by mouth once daily.      aspirin 81 MG Chew Take 1 tablet (81 mg total) by mouth once daily. 30 tablet 3    atorvastatin (LIPITOR) 40 MG tablet Take 1 tablet (40 mg total) by mouth once daily. 90 tablet 3    ferrous sulfate 325 (65 FE) MG EC tablet Take 1 tablet (325 mg total) by mouth once daily. 30 tablet 3    metoprolol succinate (TOPROL-XL) 50 MG 24 hr tablet Take 1 tablet (50 mg total) by mouth once daily. 30 tablet 11    pantoprazole (PROTONIX) 40 MG tablet Take 1 tablet (40 mg total) by mouth 2 (two) times daily. 60 tablet 3    paroxetine (PAXIL) 40 MG tablet Take 40 mg by mouth once daily.      zolpidem (AMBIEN) 10 mg Tab Take 10 mg by mouth nightly as needed.      cyproheptadine (PERIACTIN) 4 mg tablet       HYDROcodone-acetaminophen (NORCO) 5-325 mg per tablet Take 1 tablet by mouth every 6 (six)  hours as needed.      ketoconazole (NIZORAL) 2 % cream Apply topically once daily. for 7 days 60 g 0    multivitamin Tab Take 1 tablet by mouth once daily. 30 tablet 2    sucralfate (CARAFATE) 1 gram tablet Take 1 tablet (1 g total) by mouth 4 (four) times daily. 120 tablet 0       Current Inpatient Medications:    Current Facility-Administered Medications:     acetaminophen tablet 650 mg, 650 mg, Oral, Q8H PRN, Mesfin Ruffin PA-C    acetaminophen tablet 650 mg, 650 mg, Oral, Q4H PRN, Mesfin Ruffin PA-C, 650 mg at 10/17/22 2340    albuterol-ipratropium 2.5 mg-0.5 mg/3 mL nebulizer solution 3 mL, 3 mL, Nebulization, Q6H, Zofia Carlos MD, 3 mL at 10/21/22 0050    aluminum-magnesium hydroxide-simethicone 200-200-20 mg/5 mL suspension 30 mL, 30 mL, Oral, Q4H PRN, NELI Singh    amLODIPine tablet 5 mg, 5 mg, Oral, Daily, Zofia Carlos MD, 5 mg at 10/20/22 0944    apixaban tablet 5 mg, 5 mg, Oral, BID, Zofia Carlos MD, 5 mg at 10/20/22 2016    ARIPiprazole tablet 10 mg, 10 mg, Oral, Daily, NELI Singh, 10 mg at 10/20/22 0944    atorvastatin tablet 40 mg, 40 mg, Oral, Daily, Zofia Carlos MD, 40 mg at 10/20/22 0944    bisacodyL suppository 10 mg, 10 mg, Rectal, Daily PRN, NELI Singh    cyproheptadine 4 mg tablet 4 mg, 4 mg, Oral, BID, NELI Singh, 4 mg at 10/20/22 2016    dextrose 50% injection 12.5 g, 12.5 g, Intravenous, PRN, Mesfin Ruffin PA-C    dextrose 50% injection 25 g, 25 g, Intravenous, PRN, Mesfin Ruffin PA-C    doxycycline (VIBRAMYCIN) 100 mg in dextrose 5 % 250 mL IVPB, 100 mg, Intravenous, Q12H, Zofia Carlos MD, Stopped at 10/20/22 2323    ferrous sulfate tablet 1 each, 1 tablet, Oral, Daily, NELI Singh, 1 each at 10/20/22 0945    glucagon (human recombinant) injection 1 mg, 1 mg, Intramuscular, PRN, Mesfin Ruffin PA-C    glucose chewable tablet 16 g, 16 g, Oral, PRN, Mesfin Ruffin PA-C    glucose chewable tablet 24 g, 24 g, Oral, PRN, Mesfin Ruffin,  YOUSIF    labetaloL injection 10 mg, 10 mg, Intravenous, Q15 Min PRN, Zofia Carlos MD    lactated ringers infusion, , Intravenous, Continuous, Zofia Carlos MD, Last Rate: 50 mL/hr at 10/21/22 0524, New Bag at 10/21/22 0524    levoFLOXacin 750 mg/150 mL IVPB 750 mg, 750 mg, Intravenous, Q24H, Zofia Carlos MD, Stopped at 10/21/22 0228    methylPREDNISolone sodium succinate injection 40 mg, 40 mg, Intravenous, Q24H, Julián Erwin MD, 40 mg at 10/21/22 0523    metoprolol succinate (TOPROL-XL) 24 hr tablet 50 mg, 50 mg, Oral, Daily, NELI Singh, 50 mg at 10/20/22 0943    metronidazole IVPB 500 mg, 500 mg, Intravenous, Q8H, Zofia Carlos MD, Stopped at 10/21/22 0751    miconazole NITRATE 2 % top powder, , Topical (Top), BID, Kvng Dailey MD, Given at 10/20/22 2016    multivitamin tablet, 1 tablet, Oral, Daily, NELI Singh, 1 tablet at 10/20/22 0944    nicotine 14 mg/24 hr 1 patch, 1 patch, Transdermal, Daily, Zofia Carlos MD, 1 patch at 10/20/22 0945    ondansetron injection 4 mg, 4 mg, Intravenous, Q8H PRN, Mesfin Ruffin, YOUSIF    pantoprazole EC tablet 40 mg, 40 mg, Oral, BID, NELI Singh, 40 mg at 10/20/22 2016    polyethylene glycol packet 17 g, 17 g, Oral, BID PRN, NELI Singh    prochlorperazine injection Soln 5 mg, 5 mg, Intravenous, Q6H PRN, NELI Singh    senna-docusate 8.6-50 mg per tablet 1 tablet, 1 tablet, Oral, BID PRN, NELI Singh    Flushing PICC Protocol, , , Until Discontinued **AND** sodium chloride 0.9% flush 10 mL, 10 mL, Intravenous, Q6H, 10 mL at 10/21/22 0524 **AND** sodium chloride 0.9% flush 10 mL, 10 mL, Intravenous, PRN, Zofia Carlos MD    sucralfate tablet 1 g, 1 g, Oral, QID, NELI Singh, 1 g at 10/20/22 2016    traMADoL tablet 50 mg, 50 mg, Oral, Q6H PRN, Zofia Carlos MD, 50 mg at 10/21/22 0341    trazodone split tablet 25 mg, 25 mg, Oral, Nightly PRN, NELI Singh, 25 mg at 10/20/22 2016    zinc oxide-cod liver oil 40  % paste, , Topical (Top), BID, Kvng Dailey MD, Given at 10/20/22 2016           Assessment:     IMPRESSION:  Syncope  Chronic/Old CVA    - Recurrent Syncope, Falls and Gait Ataxia with Chronic Cerebellar Infarct  Subacute CVA    - MRI Brain (10.7.22) - Several Acute Non-Hemorrhagic Lacunar Infarcts which involved Bilateral Cerebellar Hemispheres, R Aspect of the Nani and L Occipital Lobe, R Basal Ganglia and Bilateral Frontoparietal Lobes; Old Lacunar Infarcts which involve Bilateral Cerebellar Hemispheres and the R Corona Radiata  Anemia  HTN  Chronic Tobacco Use  GERD/PUD Rupture/Repair  Hx of GIB    PLAN:     PLAN:  ILR placement today  Risk, Benefits and Alternatives Reviewed and Discussed with the PT and their Family and they wish to proceed with above Procedure.  F/U with Dr. Almanza in 7-10 days  Will sign off. Thank you for allowing us to participate in the care of this patient. Please call us with any questions.    Thank you for your consult.     Curtis Petersen [facility]-BC  Cardiology  [facility] - Oncology Acute  10/21/2022 9:09 AM

## 2022-10-21 NOTE — PT/OT/SLP PROGRESS
Attempted to see pt for PT tx. CNA present in pt room helping pt with bath and getting changed. PT to f/u later.

## 2022-10-22 LAB
ALBUMIN SERPL-MCNC: 2 GM/DL (ref 3.4–4.8)
ALBUMIN/GLOB SERPL: 0.8 RATIO (ref 1.1–2)
ALP SERPL-CCNC: 90 UNIT/L (ref 40–150)
ALT SERPL-CCNC: 18 UNIT/L (ref 0–55)
AST SERPL-CCNC: 18 UNIT/L (ref 5–34)
BASOPHILS # BLD AUTO: 0.04 X10(3)/MCL (ref 0–0.2)
BASOPHILS NFR BLD AUTO: 0.3 %
BILIRUBIN DIRECT+TOT PNL SERPL-MCNC: 0.2 MG/DL
BUN SERPL-MCNC: 21.5 MG/DL (ref 9.8–20.1)
CALCIUM SERPL-MCNC: 8.4 MG/DL (ref 8.4–10.2)
CHLORIDE SERPL-SCNC: 102 MMOL/L (ref 98–107)
CO2 SERPL-SCNC: 26 MMOL/L (ref 23–31)
CREAT SERPL-MCNC: 0.74 MG/DL (ref 0.55–1.02)
EOSINOPHIL # BLD AUTO: 0.09 X10(3)/MCL (ref 0–0.9)
EOSINOPHIL NFR BLD AUTO: 0.7 %
ERYTHROCYTE [DISTWIDTH] IN BLOOD BY AUTOMATED COUNT: 18.2 % (ref 11.5–17)
GFR SERPLBLD CREATININE-BSD FMLA CKD-EPI: >60 MLS/MIN/1.73/M2
GLOBULIN SER-MCNC: 2.5 GM/DL (ref 2.4–3.5)
GLUCOSE SERPL-MCNC: 90 MG/DL (ref 82–115)
HCT VFR BLD AUTO: 29.4 % (ref 37–47)
HGB BLD-MCNC: 8.7 GM/DL (ref 12–16)
IMM GRANULOCYTES # BLD AUTO: 0.65 X10(3)/MCL (ref 0–0.04)
IMM GRANULOCYTES NFR BLD AUTO: 4.8 %
LYMPHOCYTES # BLD AUTO: 2.5 X10(3)/MCL (ref 0.6–4.6)
LYMPHOCYTES NFR BLD AUTO: 18.4 %
MCH RBC QN AUTO: 26.9 PG (ref 27–31)
MCHC RBC AUTO-ENTMCNC: 29.6 MG/DL (ref 33–36)
MCV RBC AUTO: 90.7 FL (ref 80–94)
MONOCYTES # BLD AUTO: 0.93 X10(3)/MCL (ref 0.1–1.3)
MONOCYTES NFR BLD AUTO: 6.8 %
NEUTROPHILS # BLD AUTO: 9.4 X10(3)/MCL (ref 2.1–9.2)
NEUTROPHILS NFR BLD AUTO: 69 %
NRBC BLD AUTO-RTO: 0.1 %
PLATELET # BLD AUTO: 331 X10(3)/MCL (ref 130–400)
PMV BLD AUTO: 10.1 FL (ref 7.4–10.4)
POTASSIUM SERPL-SCNC: 4.3 MMOL/L (ref 3.5–5.1)
PROT SERPL-MCNC: 4.5 GM/DL (ref 5.8–7.6)
RBC # BLD AUTO: 3.24 X10(6)/MCL (ref 4.2–5.4)
SODIUM SERPL-SCNC: 137 MMOL/L (ref 136–145)
WBC # SPEC AUTO: 13.6 X10(3)/MCL (ref 4.5–11.5)

## 2022-10-22 PROCEDURE — 80053 COMPREHEN METABOLIC PANEL: CPT | Performed by: STUDENT IN AN ORGANIZED HEALTH CARE EDUCATION/TRAINING PROGRAM

## 2022-10-22 PROCEDURE — S0030 INJECTION, METRONIDAZOLE: HCPCS | Performed by: INTERNAL MEDICINE

## 2022-10-22 PROCEDURE — A4216 STERILE WATER/SALINE, 10 ML: HCPCS | Performed by: INTERNAL MEDICINE

## 2022-10-22 PROCEDURE — 36415 COLL VENOUS BLD VENIPUNCTURE: CPT | Performed by: STUDENT IN AN ORGANIZED HEALTH CARE EDUCATION/TRAINING PROGRAM

## 2022-10-22 PROCEDURE — 25000003 PHARM REV CODE 250: Performed by: NURSE PRACTITIONER

## 2022-10-22 PROCEDURE — S4991 NICOTINE PATCH NONLEGEND: HCPCS | Performed by: INTERNAL MEDICINE

## 2022-10-22 PROCEDURE — 85025 COMPLETE CBC W/AUTO DIFF WBC: CPT | Performed by: STUDENT IN AN ORGANIZED HEALTH CARE EDUCATION/TRAINING PROGRAM

## 2022-10-22 PROCEDURE — 25000003 PHARM REV CODE 250: Performed by: INTERNAL MEDICINE

## 2022-10-22 PROCEDURE — 94640 AIRWAY INHALATION TREATMENT: CPT

## 2022-10-22 PROCEDURE — 94761 N-INVAS EAR/PLS OXIMETRY MLT: CPT

## 2022-10-22 PROCEDURE — 63600175 PHARM REV CODE 636 W HCPCS: Performed by: INTERNAL MEDICINE

## 2022-10-22 PROCEDURE — 21400001 HC TELEMETRY ROOM

## 2022-10-22 PROCEDURE — 25000242 PHARM REV CODE 250 ALT 637 W/ HCPCS: Performed by: INTERNAL MEDICINE

## 2022-10-22 RX ADMIN — AMLODIPINE BESYLATE 5 MG: 5 TABLET ORAL at 09:10

## 2022-10-22 RX ADMIN — CYPROHEPTADINE HYDROCHLORIDE 4 MG: 4 TABLET ORAL at 09:10

## 2022-10-22 RX ADMIN — SUCRALFATE 1 G: 1 TABLET ORAL at 05:10

## 2022-10-22 RX ADMIN — DOXYCYCLINE 100 MG: 100 INJECTION, POWDER, LYOPHILIZED, FOR SOLUTION INTRAVENOUS at 09:10

## 2022-10-22 RX ADMIN — Medication: at 09:10

## 2022-10-22 RX ADMIN — FERROUS SULFATE TAB 325 MG (65 MG ELEMENTAL FE) 1 EACH: 325 (65 FE) TAB at 09:10

## 2022-10-22 RX ADMIN — LEVOFLOXACIN 500 MG: 500 INJECTION, SOLUTION INTRAVENOUS at 12:10

## 2022-10-22 RX ADMIN — SUCRALFATE 1 G: 1 TABLET ORAL at 12:10

## 2022-10-22 RX ADMIN — SODIUM CHLORIDE, PRESERVATIVE FREE 10 ML: 5 INJECTION INTRAVENOUS at 11:10

## 2022-10-22 RX ADMIN — METOPROLOL SUCCINATE 50 MG: 50 TABLET, FILM COATED, EXTENDED RELEASE ORAL at 09:10

## 2022-10-22 RX ADMIN — MICONAZOLE NITRATE 2 % TOPICAL POWDER: at 09:10

## 2022-10-22 RX ADMIN — TRAMADOL HYDROCHLORIDE 50 MG: 50 TABLET, COATED ORAL at 05:10

## 2022-10-22 RX ADMIN — TRAMADOL HYDROCHLORIDE 50 MG: 50 TABLET, COATED ORAL at 12:10

## 2022-10-22 RX ADMIN — NICOTINE 1 PATCH: 14 PATCH TRANSDERMAL at 09:10

## 2022-10-22 RX ADMIN — TRAMADOL HYDROCHLORIDE 50 MG: 50 TABLET, COATED ORAL at 06:10

## 2022-10-22 RX ADMIN — SODIUM CHLORIDE, PRESERVATIVE FREE 10 ML: 5 INJECTION INTRAVENOUS at 06:10

## 2022-10-22 RX ADMIN — METRONIDAZOLE 500 MG: 5 INJECTION, SOLUTION INTRAVENOUS at 06:10

## 2022-10-22 RX ADMIN — SODIUM CHLORIDE, PRESERVATIVE FREE 10 ML: 5 INJECTION INTRAVENOUS at 12:10

## 2022-10-22 RX ADMIN — DOXYCYCLINE 100 MG: 100 INJECTION, POWDER, LYOPHILIZED, FOR SOLUTION INTRAVENOUS at 10:10

## 2022-10-22 RX ADMIN — PANTOPRAZOLE SODIUM 40 MG: 40 TABLET, DELAYED RELEASE ORAL at 09:10

## 2022-10-22 RX ADMIN — IPRATROPIUM BROMIDE AND ALBUTEROL SULFATE 3 ML: 2.5; .5 SOLUTION RESPIRATORY (INHALATION) at 01:10

## 2022-10-22 RX ADMIN — SUCRALFATE 1 G: 1 TABLET ORAL at 10:10

## 2022-10-22 RX ADMIN — APIXABAN 5 MG: 5 TABLET, FILM COATED ORAL at 09:10

## 2022-10-22 RX ADMIN — TRAZODONE HYDROCHLORIDE 25 MG: 50 TABLET ORAL at 09:10

## 2022-10-22 RX ADMIN — ARIPIPRAZOLE 10 MG: 5 TABLET ORAL at 09:10

## 2022-10-22 RX ADMIN — THERA TABS 1 TABLET: TAB at 09:10

## 2022-10-22 RX ADMIN — SUCRALFATE 1 G: 1 TABLET ORAL at 09:10

## 2022-10-22 RX ADMIN — METRONIDAZOLE 500 MG: 5 INJECTION, SOLUTION INTRAVENOUS at 05:10

## 2022-10-22 RX ADMIN — METRONIDAZOLE 500 MG: 5 INJECTION, SOLUTION INTRAVENOUS at 11:10

## 2022-10-22 RX ADMIN — ATORVASTATIN CALCIUM 40 MG: 40 TABLET, FILM COATED ORAL at 09:10

## 2022-10-22 RX ADMIN — IPRATROPIUM BROMIDE AND ALBUTEROL SULFATE 3 ML: 2.5; .5 SOLUTION RESPIRATORY (INHALATION) at 08:10

## 2022-10-22 NOTE — PLAN OF CARE
Problem: Adult Inpatient Plan of Care  Goal: Plan of Care Review  Outcome: Ongoing, Progressing  Flowsheets (Taken 10/22/2022 0241)  Plan of Care Reviewed With: patient  Goal: Patient-Specific Goal (Individualized)  Outcome: Ongoing, Progressing  Goal: Absence of Hospital-Acquired Illness or Injury  Outcome: Ongoing, Progressing  Intervention: Identify and Manage Fall Risk  Flowsheets (Taken 10/22/2022 0241)  Safety Promotion/Fall Prevention:   assistive device/personal item within reach   side rails raised x 2   nonskid shoes/socks when out of bed   Fall Risk signage in place  Intervention: Prevent Skin Injury  Flowsheets (Taken 10/22/2022 0241)  Body Position: position changed independently  Intervention: Prevent and Manage VTE (Venous Thromboembolism) Risk  Flowsheets (Taken 10/22/2022 0241)  VTE Prevention/Management: bleeding risk assessed  Goal: Optimal Comfort and Wellbeing  Outcome: Ongoing, Progressing  Intervention: Monitor Pain and Promote Comfort  Flowsheets (Taken 10/22/2022 0241)  Pain Management Interventions:   care clustered   heat applied   quiet environment facilitated  Goal: Readiness for Transition of Care  Outcome: Ongoing, Progressing     Problem: Fluid Imbalance (Pneumonia)  Goal: Fluid Balance  Outcome: Ongoing, Progressing     Problem: Infection (Pneumonia)  Goal: Resolution of Infection Signs and Symptoms  Outcome: Ongoing, Progressing     Problem: Respiratory Compromise (Pneumonia)  Goal: Effective Oxygenation and Ventilation  Outcome: Ongoing, Progressing     Problem: Impaired Wound Healing  Goal: Optimal Wound Healing  Outcome: Ongoing, Progressing     Problem: Fall Injury Risk  Goal: Absence of Fall and Fall-Related Injury  Outcome: Ongoing, Progressing     Problem: Skin Injury Risk Increased  Goal: Skin Health and Integrity  Outcome: Ongoing, Progressing     Problem: Adjustment to Illness (Stroke, Ischemic/Transient Ischemic Attack)  Goal: Optimal Coping  Outcome: Ongoing,  Progressing     Problem: Bowel Elimination Impaired (Stroke, Ischemic/Transient Ischemic Attack)  Goal: Effective Bowel Elimination  Outcome: Ongoing, Progressing     Problem: Cerebral Tissue Perfusion (Stroke, Ischemic/Transient Ischemic Attack)  Goal: Optimal Cerebral Tissue Perfusion  Outcome: Ongoing, Progressing     Problem: Cognitive Impairment (Stroke, Ischemic/Transient Ischemic Attack)  Goal: Optimal Cognitive Function  Outcome: Ongoing, Progressing     Problem: Communication Impairment (Stroke, Ischemic/Transient Ischemic Attack)  Goal: Improved Communication Skills  Outcome: Ongoing, Progressing     Problem: Functional Ability Impaired (Stroke, Ischemic/Transient Ischemic Attack)  Goal: Optimal Functional Ability  Outcome: Ongoing, Progressing     Problem: Respiratory Compromise (Stroke, Ischemic/Transient Ischemic Attack)  Goal: Effective Oxygenation and Ventilation  Outcome: Ongoing, Progressing     Problem: Sensorimotor Impairment (Stroke, Ischemic/Transient Ischemic Attack)  Goal: Improved Sensorimotor Function  Outcome: Ongoing, Progressing     Problem: Swallowing Impairment (Stroke, Ischemic/Transient Ischemic Attack)  Goal: Optimal Eating and Swallowing without Aspiration  Outcome: Ongoing, Progressing     Problem: Urinary Elimination Impaired (Stroke, Ischemic/Transient Ischemic Attack)  Goal: Effective Urinary Elimination  Outcome: Ongoing, Progressing

## 2022-10-22 NOTE — PROGRESS NOTES
[facility]  Hospital Medicine Progress Note        Chief Complaint: Inpatient Follow-up    Subjective  A 66-year-old female with significant history of recurrent syncope/fall/ataxic gait.  Diagnosed with the cerebellar infarcts likely embolic on aspirin, Eliquis.  Patient awaiting LINQ placement.  She was admitted to our service and was discharged on 10/11 after being diagnosed with CVA.  Patient admitted hospitalist medicine service on 10/17 again with recurrent syncopal/collapse episodes.  During previous hospitalization patient was significantly anemic, she underwent GI evaluation with EGD showing some ulceration at gastrectomy anastomotic site with no active bleeding.  During previous hospitalization therapy services had recommended skilled nursing facility placement, but the patient refused and went home.  Given persistent/recurrent syncope/collapse decided to repeat MRI.  MRI showed evolving CVA-bilateral cerebral/cerebellar.  Neurology consulted given ongoing symptoms.  CT chest, abdomen, pelvis also ordered given significant weight loss and smoking history.  No occult malignancy.  But consistent with pneumonia.  Initiated appropriate antibiotics.  Home anti thrombotics including Eliquis resumed    Interval Hx:   Seen and examined.  Afebrile vitals stable and hemodynamically stable denying pain or discomfort at this time of the    Objective/physical exam:  General: In no acute distress, afebrile  Chest:  Few scattered wheezes   Heart: S1, S2, no appreciable murmur  Abdomen: Soft, nontender, BS +  MSK: Warm, no lower extremity edema, no clubbing or cyanosis  Neurologic: Alert and oriented x4,     VITAL SIGNS: 24 HRS MIN & MAX LAST   Temp  Min: 97.5 °F (36.4 °C)  Max: 98.4 °F (36.9 °C) 98.4 °F (36.9 °C)   BP  Min: 109/63  Max: 119/70 113/66   Pulse  Min: 71  Max: 85  79   Resp  Min: 16  Max: 20 18   SpO2  Min: 94 %  Max: 97 % (!) 94 %       Recent Labs   Lab 10/22/22  0351   WBC  13.6*   RBC 3.24*   HGB 8.7*   HCT 29.4*   MCV 90.7   MCH 26.9*   MCHC 29.6*   RDW 18.2*      MPV 10.1           Recent Labs   Lab 10/18/22  0917 10/19/22  0447 10/20/22  0341 10/22/22  0351   NA  --  135*   < > 137   K  --  4.4   < > 4.3   CO2  --  26   < > 26   BUN  --  15.6   < > 21.5*   CREATININE  --  0.63   < > 0.74   CALCIUM  --  8.4   < > 8.4   PH 7.39  --   --   --    MG  --  1.50*  --   --    ALBUMIN  --  1.7*   < > 2.0*   ALKPHOS  --  94   < > 90   ALT  --  17   < > 18   AST  --  18   < > 18   BILITOT  --  0.2   < > 0.2    < > = values in this interval not displayed.            Microbiology Results (last 7 days)       ** No results found for the last 168 hours. **             Scheduled Med:   albuterol-ipratropium  3 mL Nebulization Q6H    amLODIPine  5 mg Oral Daily    apixaban  5 mg Oral BID    ARIPiprazole  10 mg Oral Daily    atorvastatin  40 mg Oral Daily    cyproheptadine  4 mg Oral BID    doxycycline (VIBRAMYCIN) IVPB  100 mg Intravenous Q12H    ferrous sulfate  1 tablet Oral Daily    levoFLOXacin  500 mg Intravenous Q24H    metoprolol succinate  50 mg Oral Daily    metronidazole  500 mg Intravenous Q8H    miconazole NITRATE 2 %   Topical (Top) BID    multivitamin  1 tablet Oral Daily    nicotine  1 patch Transdermal Daily    pantoprazole  40 mg Oral BID    sodium chloride 0.9%  10 mL Intravenous Q6H    sucralfate  1 g Oral QID    zinc oxide-cod liver oil   Topical (Top) BID          Assessment/Plan:  Recurrent syncope/collapse  Subacute CVA-bilateral cerebral/cerebellar  Abnormal gait  Right-sided pneumonia-rule out aspiration  Pulmonary cachexia  COPD with acute exacerbation   Acute on chronic hypoxemic/hypercapnic respiratory failure  Essential HTN-accelerated   HLD   Chronic anemia  Gastric ulcer  Chronic tobacco use  Prophylaxis    - Waiting for placement, keep antibiotics 1 more day to complete treatment 5 days and DC.  -  Discussed with neurology they are recommending Eliquis 5 mg  b.I.d. at this time due to distribution of the stroke suggesting embolic and suspicion of Afib cardiac reasons are very high, patient will also get leg placement for definitive diagnoses.  -   DC aspirin since no history of stent.  -   Discussed with the patient about fall risk and placement.  MRI brain was repeated 10/18-no new stroke, Confirms evolving bilateral cerebral/cerebellar CVAs  Continue antibiotics for right-sided pneumonia   -   Discontinue IV fluids.  Patient reported unintentional weight loss, CT chest, abdomen/pelvis with C, no malignancy    We will discontinue steroids continue bronchodilators, she looks very comfortable and there is no wheezing.  Supplemental oxygen to keep saturations more than 90%     Although D-dimer was elevated V/Q scan and bilateral lower extremity Doppler ultrasound is negative.  It was most likely due to inflammatory process.  Continue PPI, Carafate for history of gastric anastomotic ulcer   Patient will need Protonix daily since she is on Eliquis now.  Continue other home meds-aripiprazole, statin, iron, Toprol-XL, multivitamin   Nicotine patch   BP accelerated occasionally and therefore  add amlodipine  DVT prophylaxis-on Eliquis    Case management consult for rehab placement    Julián Erwin MD   10/22/2022

## 2022-10-23 LAB
ABS NEUT (OLG): 10.57 X10(3)/MCL (ref 2.1–9.2)
ALBUMIN SERPL-MCNC: 1.9 GM/DL (ref 3.4–4.8)
ALBUMIN/GLOB SERPL: 0.7 RATIO (ref 1.1–2)
ALP SERPL-CCNC: 93 UNIT/L (ref 40–150)
ALT SERPL-CCNC: 24 UNIT/L (ref 0–55)
ANISOCYTOSIS BLD QL SMEAR: ABNORMAL
AST SERPL-CCNC: 44 UNIT/L (ref 5–34)
BASOPHILS NFR BLD MANUAL: 0.45 X10(3)/MCL (ref 0–0.2)
BASOPHILS NFR BLD MANUAL: 3 %
BILIRUBIN DIRECT+TOT PNL SERPL-MCNC: 0.2 MG/DL
BUN SERPL-MCNC: 21.5 MG/DL (ref 9.8–20.1)
CALCIUM SERPL-MCNC: 8.3 MG/DL (ref 8.4–10.2)
CHLORIDE SERPL-SCNC: 100 MMOL/L (ref 98–107)
CO2 SERPL-SCNC: 27 MMOL/L (ref 23–31)
CREAT SERPL-MCNC: 0.79 MG/DL (ref 0.55–1.02)
EOSINOPHIL NFR BLD MANUAL: 0.6 X10(3)/MCL (ref 0–0.9)
EOSINOPHIL NFR BLD MANUAL: 4 %
ERYTHROCYTE [DISTWIDTH] IN BLOOD BY AUTOMATED COUNT: 19.3 % (ref 11.5–17)
GFR SERPLBLD CREATININE-BSD FMLA CKD-EPI: >60 MLS/MIN/1.73/M2
GIANT PLATELETS # (OHS): 1 MCL
GLOBULIN SER-MCNC: 2.7 GM/DL (ref 2.4–3.5)
GLUCOSE SERPL-MCNC: 87 MG/DL (ref 82–115)
HCT VFR BLD AUTO: 31.2 % (ref 37–47)
HGB BLD-MCNC: 9.4 GM/DL (ref 12–16)
HYPOCHROMIA BLD QL SMEAR: ABNORMAL
IMM GRANULOCYTES # BLD AUTO: 1 X10(3)/MCL (ref 0–0.04)
IMM GRANULOCYTES NFR BLD AUTO: 6.8 %
INSTRUMENT WBC (OLG): 15.1 X10(3)/MCL
LYMPHOCYTES NFR BLD MANUAL: 17 %
LYMPHOCYTES NFR BLD MANUAL: 2.57 X10(3)/MCL
MAGNESIUM SERPL-MCNC: 1.4 MG/DL (ref 1.6–2.6)
MCH RBC QN AUTO: 27.1 PG (ref 27–31)
MCHC RBC AUTO-ENTMCNC: 30.1 MG/DL (ref 33–36)
MCV RBC AUTO: 89.9 FL (ref 80–94)
MICROCYTES BLD QL SMEAR: ABNORMAL
MONOCYTES NFR BLD MANUAL: 0.76 X10(3)/MCL (ref 0.1–1.3)
MONOCYTES NFR BLD MANUAL: 5 %
MYELOCYTES NFR BLD MANUAL: 1 %
NEUTROPHILS NFR BLD MANUAL: 69 %
NRBC BLD AUTO-RTO: 0 %
PLATELET # BLD AUTO: 347 X10(3)/MCL (ref 130–400)
PLATELET # BLD EST: NORMAL 10*3/UL
PMV BLD AUTO: 10.3 FL (ref 7.4–10.4)
POLYCHROMASIA BLD QL SMEAR: ABNORMAL
POTASSIUM SERPL-SCNC: 4.3 MMOL/L (ref 3.5–5.1)
PROT SERPL-MCNC: 4.6 GM/DL (ref 5.8–7.6)
RBC # BLD AUTO: 3.47 X10(6)/MCL (ref 4.2–5.4)
RBC MORPH BLD: ABNORMAL
SODIUM SERPL-SCNC: 137 MMOL/L (ref 136–145)
WBC # SPEC AUTO: 14.7 X10(3)/MCL (ref 4.5–11.5)
WBC VACUOLES (OHS): ABNORMAL

## 2022-10-23 PROCEDURE — 21400001 HC TELEMETRY ROOM

## 2022-10-23 PROCEDURE — 25000003 PHARM REV CODE 250: Performed by: NURSE PRACTITIONER

## 2022-10-23 PROCEDURE — 94640 AIRWAY INHALATION TREATMENT: CPT

## 2022-10-23 PROCEDURE — 63600175 PHARM REV CODE 636 W HCPCS: Performed by: STUDENT IN AN ORGANIZED HEALTH CARE EDUCATION/TRAINING PROGRAM

## 2022-10-23 PROCEDURE — 80053 COMPREHEN METABOLIC PANEL: CPT | Performed by: STUDENT IN AN ORGANIZED HEALTH CARE EDUCATION/TRAINING PROGRAM

## 2022-10-23 PROCEDURE — A4216 STERILE WATER/SALINE, 10 ML: HCPCS | Performed by: INTERNAL MEDICINE

## 2022-10-23 PROCEDURE — 85027 COMPLETE CBC AUTOMATED: CPT | Performed by: STUDENT IN AN ORGANIZED HEALTH CARE EDUCATION/TRAINING PROGRAM

## 2022-10-23 PROCEDURE — 25000003 PHARM REV CODE 250: Performed by: INTERNAL MEDICINE

## 2022-10-23 PROCEDURE — S4991 NICOTINE PATCH NONLEGEND: HCPCS | Performed by: INTERNAL MEDICINE

## 2022-10-23 PROCEDURE — 83735 ASSAY OF MAGNESIUM: CPT | Performed by: STUDENT IN AN ORGANIZED HEALTH CARE EDUCATION/TRAINING PROGRAM

## 2022-10-23 PROCEDURE — S0030 INJECTION, METRONIDAZOLE: HCPCS | Performed by: INTERNAL MEDICINE

## 2022-10-23 PROCEDURE — 63600175 PHARM REV CODE 636 W HCPCS: Performed by: INTERNAL MEDICINE

## 2022-10-23 PROCEDURE — 94761 N-INVAS EAR/PLS OXIMETRY MLT: CPT

## 2022-10-23 PROCEDURE — 36415 COLL VENOUS BLD VENIPUNCTURE: CPT | Performed by: STUDENT IN AN ORGANIZED HEALTH CARE EDUCATION/TRAINING PROGRAM

## 2022-10-23 PROCEDURE — 25000242 PHARM REV CODE 250 ALT 637 W/ HCPCS: Performed by: INTERNAL MEDICINE

## 2022-10-23 RX ORDER — MAGNESIUM SULFATE HEPTAHYDRATE 40 MG/ML
2 INJECTION, SOLUTION INTRAVENOUS ONCE
Status: COMPLETED | OUTPATIENT
Start: 2022-10-23 | End: 2022-10-23

## 2022-10-23 RX ADMIN — SODIUM CHLORIDE, PRESERVATIVE FREE 10 ML: 5 INJECTION INTRAVENOUS at 11:10

## 2022-10-23 RX ADMIN — TRAMADOL HYDROCHLORIDE 50 MG: 50 TABLET, COATED ORAL at 01:10

## 2022-10-23 RX ADMIN — ATORVASTATIN CALCIUM 40 MG: 40 TABLET, FILM COATED ORAL at 09:10

## 2022-10-23 RX ADMIN — Medication: at 09:10

## 2022-10-23 RX ADMIN — METOPROLOL SUCCINATE 50 MG: 50 TABLET, FILM COATED, EXTENDED RELEASE ORAL at 09:10

## 2022-10-23 RX ADMIN — Medication: at 08:10

## 2022-10-23 RX ADMIN — ARIPIPRAZOLE 10 MG: 5 TABLET ORAL at 09:10

## 2022-10-23 RX ADMIN — IPRATROPIUM BROMIDE AND ALBUTEROL SULFATE 3 ML: 2.5; .5 SOLUTION RESPIRATORY (INHALATION) at 07:10

## 2022-10-23 RX ADMIN — MICONAZOLE NITRATE 2 % TOPICAL POWDER: at 08:10

## 2022-10-23 RX ADMIN — SUCRALFATE 1 G: 1 TABLET ORAL at 01:10

## 2022-10-23 RX ADMIN — TRAMADOL HYDROCHLORIDE 50 MG: 50 TABLET, COATED ORAL at 07:10

## 2022-10-23 RX ADMIN — SUCRALFATE 1 G: 1 TABLET ORAL at 05:10

## 2022-10-23 RX ADMIN — SODIUM CHLORIDE, PRESERVATIVE FREE 10 ML: 5 INJECTION INTRAVENOUS at 06:10

## 2022-10-23 RX ADMIN — PANTOPRAZOLE SODIUM 40 MG: 40 TABLET, DELAYED RELEASE ORAL at 09:10

## 2022-10-23 RX ADMIN — METRONIDAZOLE 500 MG: 5 INJECTION, SOLUTION INTRAVENOUS at 06:10

## 2022-10-23 RX ADMIN — LEVOFLOXACIN 500 MG: 500 INJECTION, SOLUTION INTRAVENOUS at 12:10

## 2022-10-23 RX ADMIN — CYPROHEPTADINE HYDROCHLORIDE 4 MG: 4 TABLET ORAL at 08:10

## 2022-10-23 RX ADMIN — MICONAZOLE NITRATE 2 % TOPICAL POWDER: at 09:10

## 2022-10-23 RX ADMIN — PANTOPRAZOLE SODIUM 40 MG: 40 TABLET, DELAYED RELEASE ORAL at 08:10

## 2022-10-23 RX ADMIN — FERROUS SULFATE TAB 325 MG (65 MG ELEMENTAL FE) 1 EACH: 325 (65 FE) TAB at 09:10

## 2022-10-23 RX ADMIN — MAGNESIUM SULFATE HEPTAHYDRATE 2 G: 40 INJECTION, SOLUTION INTRAVENOUS at 09:10

## 2022-10-23 RX ADMIN — THERA TABS 1 TABLET: TAB at 09:10

## 2022-10-23 RX ADMIN — TRAMADOL HYDROCHLORIDE 50 MG: 50 TABLET, COATED ORAL at 12:10

## 2022-10-23 RX ADMIN — APIXABAN 5 MG: 5 TABLET, FILM COATED ORAL at 08:10

## 2022-10-23 RX ADMIN — SUCRALFATE 1 G: 1 TABLET ORAL at 09:10

## 2022-10-23 RX ADMIN — DOXYCYCLINE 100 MG: 100 INJECTION, POWDER, LYOPHILIZED, FOR SOLUTION INTRAVENOUS at 11:10

## 2022-10-23 RX ADMIN — TRAMADOL HYDROCHLORIDE 50 MG: 50 TABLET, COATED ORAL at 08:10

## 2022-10-23 RX ADMIN — CYPROHEPTADINE HYDROCHLORIDE 4 MG: 4 TABLET ORAL at 09:10

## 2022-10-23 RX ADMIN — IPRATROPIUM BROMIDE AND ALBUTEROL SULFATE 3 ML: 2.5; .5 SOLUTION RESPIRATORY (INHALATION) at 01:10

## 2022-10-23 RX ADMIN — APIXABAN 5 MG: 5 TABLET, FILM COATED ORAL at 09:10

## 2022-10-23 RX ADMIN — SUCRALFATE 1 G: 1 TABLET ORAL at 08:10

## 2022-10-23 RX ADMIN — TRAZODONE HYDROCHLORIDE 25 MG: 50 TABLET ORAL at 08:10

## 2022-10-23 RX ADMIN — SODIUM CHLORIDE, PRESERVATIVE FREE 10 ML: 5 INJECTION INTRAVENOUS at 05:10

## 2022-10-23 RX ADMIN — NICOTINE 1 PATCH: 14 PATCH TRANSDERMAL at 09:10

## 2022-10-23 NOTE — PROGRESS NOTES
[facility]  Hospital Medicine Progress Note        Chief Complaint: Inpatient Follow-up    Subjective  A 66-year-old female with significant history of recurrent syncope/fall/ataxic gait.  Diagnosed with the cerebellar infarcts likely embolic on aspirin, Eliquis.  Patient awaiting LINQ placement.  She was admitted to our service and was discharged on 10/11 after being diagnosed with CVA.  Patient admitted hospitalist medicine service on 10/17 again with recurrent syncopal/collapse episodes.  During previous hospitalization patient was significantly anemic, she underwent GI evaluation with EGD showing some ulceration at gastrectomy anastomotic site with no active bleeding.  During previous hospitalization therapy services had recommended skilled nursing facility placement, but the patient refused and went home.  Given persistent/recurrent syncope/collapse decided to repeat MRI.  MRI showed evolving CVA-bilateral cerebral/cerebellar.  Neurology consulted given ongoing symptoms.  CT chest, abdomen, pelvis also ordered given significant weight loss and smoking history.  No occult malignancy.  But consistent with pneumonia.  Initiated appropriate antibiotics.  Home anti thrombotics including Eliquis resumed    Interval Hx:   Seen and examined.  Afebrile vitals stable and hemodynamically stable denying pain or discomfort at this time.     Objective/physical exam:  General: In no acute distress, afebrile  Chest:  Few scattered wheezes   Heart: S1, S2, no appreciable murmur  Abdomen: Soft, nontender, BS +  MSK: Warm, no lower extremity edema, no clubbing or cyanosis  Neurologic: Alert and oriented x4,     VITAL SIGNS: 24 HRS MIN & MAX LAST   Temp  Min: 98.1 °F (36.7 °C)  Max: 99.2 °F (37.3 °C) 98.4 °F (36.9 °C)   BP  Min: 102/59  Max: 113/63 (!) 102/59   Pulse  Min: 76  Max: 103  82   Resp  Min: 16  Max: 20 18   SpO2  Min: 91 %  Max: 94 % (!) 93 %       Recent Labs   Lab 10/23/22  0535   WBC  14.7*   RBC 3.47*   HGB 9.4*   HCT 31.2*   MCV 89.9   MCH 27.1   MCHC 30.1*   RDW 19.3*      MPV 10.3           Recent Labs   Lab 10/18/22  0917 10/19/22  0447 10/23/22  0535   NA  --    < > 137   K  --    < > 4.3   CO2  --    < > 27   BUN  --    < > 21.5*   CREATININE  --    < > 0.79   CALCIUM  --    < > 8.3*   PH 7.39  --   --    MG  --    < > 1.40*   ALBUMIN  --    < > 1.9*   ALKPHOS  --    < > 93   ALT  --    < > 24   AST  --    < > 44*   BILITOT  --    < > 0.2    < > = values in this interval not displayed.            Microbiology Results (last 7 days)       ** No results found for the last 168 hours. **             Scheduled Med:   albuterol-ipratropium  3 mL Nebulization Q6H    amLODIPine  5 mg Oral Daily    apixaban  5 mg Oral BID    ARIPiprazole  10 mg Oral Daily    atorvastatin  40 mg Oral Daily    cyproheptadine  4 mg Oral BID    ferrous sulfate  1 tablet Oral Daily    metoprolol succinate  50 mg Oral Daily    metronidazole  500 mg Intravenous Q8H    miconazole NITRATE 2 %   Topical (Top) BID    multivitamin  1 tablet Oral Daily    nicotine  1 patch Transdermal Daily    pantoprazole  40 mg Oral BID    sodium chloride 0.9%  10 mL Intravenous Q6H    sucralfate  1 g Oral QID    zinc oxide-cod liver oil   Topical (Top) BID          Assessment/Plan:  Recurrent syncope/collapse  Subacute CVA-bilateral cerebral/cerebellar  Abnormal gait  Right-sided pneumonia-rule out aspiration  Pulmonary cachexia  COPD with acute exacerbation   Acute on chronic hypoxemic/hypercapnic respiratory failure  Essential HTN-accelerated   HLD   Chronic anemia  Gastric ulcer  Chronic tobacco use  Prophylaxis    - DC abx.   -  Discussed with neurology they are recommending Eliquis 5 mg b.I.d. at this time due to distribution of the stroke suggesting embolic and suspicion of Afib cardiac reasons are very high, patient will also get leg placement for definitive diagnoses.  -   DC aspirin since no history of stent.  -   Discussed  with the patient about fall risk and placement.  MRI brain was repeated 10/18-no new stroke, Confirms evolving bilateral cerebral/cerebellar CVAs  Continue antibiotics for right-sided pneumonia   -   Discontinue IV fluids.  Patient reported unintentional weight loss, CT chest, abdomen/pelvis with C, no malignancy    We will discontinue steroids continue bronchodilators, she looks very comfortable and there is no wheezing.  Supplemental oxygen to keep saturations more than 90%     Although D-dimer was elevated V/Q scan and bilateral lower extremity Doppler ultrasound is negative.  It was most likely due to inflammatory process.  Continue PPI, Carafate for history of gastric anastomotic ulcer   Patient will need Protonix daily since she is on Eliquis now.  Continue other home meds-aripiprazole, statin, iron, Toprol-XL, multivitamin   Nicotine patch   BP accelerated occasionally and therefore  add amlodipine  DVT prophylaxis-on Eliquis    Case management consult for rehab placement    Julián Erwin MD   10/23/2022

## 2022-10-23 NOTE — PLAN OF CARE
Problem: Adult Inpatient Plan of Care  Goal: Plan of Care Review  Outcome: Ongoing, Progressing  Flowsheets (Taken 10/23/2022 0005)  Plan of Care Reviewed With: patient  Goal: Patient-Specific Goal (Individualized)  Outcome: Ongoing, Progressing  Goal: Absence of Hospital-Acquired Illness or Injury  Outcome: Ongoing, Progressing  Intervention: Identify and Manage Fall Risk  Flowsheets (Taken 10/23/2022 0005)  Safety Promotion/Fall Prevention:   assistive device/personal item within reach   side rails raised x 2   Fall Risk signage in place  Intervention: Prevent Skin Injury  Flowsheets (Taken 10/23/2022 0005)  Body Position: position changed independently  Intervention: Prevent and Manage VTE (Venous Thromboembolism) Risk  Flowsheets (Taken 10/23/2022 0005)  VTE Prevention/Management: bleeding precations maintained  Intervention: Prevent Infection  Flowsheets (Taken 10/23/2022 0005)  Infection Prevention:   rest/sleep promoted   single patient room provided  Goal: Optimal Comfort and Wellbeing  Outcome: Ongoing, Progressing  Intervention: Monitor Pain and Promote Comfort  Flowsheets (Taken 10/23/2022 0005)  Pain Management Interventions: care clustered  Goal: Readiness for Transition of Care  Outcome: Ongoing, Progressing     Problem: Fluid Imbalance (Pneumonia)  Goal: Fluid Balance  Outcome: Ongoing, Progressing     Problem: Infection (Pneumonia)  Goal: Resolution of Infection Signs and Symptoms  Outcome: Ongoing, Progressing     Problem: Respiratory Compromise (Pneumonia)  Goal: Effective Oxygenation and Ventilation  Outcome: Ongoing, Progressing     Problem: Respiratory Compromise (Pneumonia)  Goal: Effective Oxygenation and Ventilation  Outcome: Ongoing, Progressing     Problem: Infection  Goal: Absence of Infection Signs and Symptoms  Outcome: Ongoing, Progressing     Problem: Impaired Wound Healing  Goal: Optimal Wound Healing  Outcome: Ongoing, Progressing     Problem: Fall Injury Risk  Goal: Absence of  Fall and Fall-Related Injury  Outcome: Ongoing, Progressing  Intervention: Promote Injury-Free Environment  Flowsheets (Taken 10/23/2022 0005)  Safety Promotion/Fall Prevention:   assistive device/personal item within reach   side rails raised x 2   Fall Risk signage in place     Problem: Skin Injury Risk Increased  Goal: Skin Health and Integrity  Outcome: Ongoing, Progressing     Problem: Adjustment to Illness (Stroke, Ischemic/Transient Ischemic Attack)  Goal: Optimal Coping  Outcome: Ongoing, Progressing     Problem: Bowel Elimination Impaired (Stroke, Ischemic/Transient Ischemic Attack)  Goal: Effective Bowel Elimination  Outcome: Ongoing, Progressing     Problem: Cerebral Tissue Perfusion (Stroke, Ischemic/Transient Ischemic Attack)  Goal: Optimal Cerebral Tissue Perfusion  Outcome: Ongoing, Progressing     Problem: Cognitive Impairment (Stroke, Ischemic/Transient Ischemic Attack)  Goal: Optimal Cognitive Function  Outcome: Ongoing, Progressing     Problem: Functional Ability Impaired (Stroke, Ischemic/Transient Ischemic Attack)  Goal: Optimal Functional Ability  Outcome: Ongoing, Progressing     Problem: Respiratory Compromise (Stroke, Ischemic/Transient Ischemic Attack)  Goal: Effective Oxygenation and Ventilation  Outcome: Ongoing, Progressing     Problem: Sensorimotor Impairment (Stroke, Ischemic/Transient Ischemic Attack)  Goal: Improved Sensorimotor Function  Outcome: Ongoing, Progressing     Problem: Urinary Elimination Impaired (Stroke, Ischemic/Transient Ischemic Attack)  Goal: Effective Urinary Elimination  Outcome: Ongoing, Progressing

## 2022-10-24 PROCEDURE — 25000003 PHARM REV CODE 250: Performed by: INTERNAL MEDICINE

## 2022-10-24 PROCEDURE — A4216 STERILE WATER/SALINE, 10 ML: HCPCS | Performed by: INTERNAL MEDICINE

## 2022-10-24 PROCEDURE — 94761 N-INVAS EAR/PLS OXIMETRY MLT: CPT

## 2022-10-24 PROCEDURE — 97535 SELF CARE MNGMENT TRAINING: CPT

## 2022-10-24 PROCEDURE — S4991 NICOTINE PATCH NONLEGEND: HCPCS | Performed by: INTERNAL MEDICINE

## 2022-10-24 PROCEDURE — 21400001 HC TELEMETRY ROOM

## 2022-10-24 PROCEDURE — 27000221 HC OXYGEN, UP TO 24 HOURS

## 2022-10-24 PROCEDURE — 97110 THERAPEUTIC EXERCISES: CPT

## 2022-10-24 PROCEDURE — 25000003 PHARM REV CODE 250: Performed by: NURSE PRACTITIONER

## 2022-10-24 PROCEDURE — 94640 AIRWAY INHALATION TREATMENT: CPT

## 2022-10-24 PROCEDURE — 97116 GAIT TRAINING THERAPY: CPT

## 2022-10-24 PROCEDURE — 92610 EVALUATE SWALLOWING FUNCTION: CPT

## 2022-10-24 PROCEDURE — 99900035 HC TECH TIME PER 15 MIN (STAT)

## 2022-10-24 PROCEDURE — 25000242 PHARM REV CODE 250 ALT 637 W/ HCPCS: Performed by: INTERNAL MEDICINE

## 2022-10-24 RX ADMIN — MICONAZOLE NITRATE 2 % TOPICAL POWDER: at 08:10

## 2022-10-24 RX ADMIN — SUCRALFATE 1 G: 1 TABLET ORAL at 05:10

## 2022-10-24 RX ADMIN — SUCRALFATE 1 G: 1 TABLET ORAL at 08:10

## 2022-10-24 RX ADMIN — PANTOPRAZOLE SODIUM 40 MG: 40 TABLET, DELAYED RELEASE ORAL at 10:10

## 2022-10-24 RX ADMIN — ARIPIPRAZOLE 10 MG: 5 TABLET ORAL at 10:10

## 2022-10-24 RX ADMIN — CYPROHEPTADINE HYDROCHLORIDE 4 MG: 4 TABLET ORAL at 10:10

## 2022-10-24 RX ADMIN — APIXABAN 5 MG: 5 TABLET, FILM COATED ORAL at 10:10

## 2022-10-24 RX ADMIN — TRAZODONE HYDROCHLORIDE 25 MG: 50 TABLET ORAL at 08:10

## 2022-10-24 RX ADMIN — SODIUM CHLORIDE, PRESERVATIVE FREE 10 ML: 5 INJECTION INTRAVENOUS at 01:10

## 2022-10-24 RX ADMIN — NICOTINE 1 PATCH: 14 PATCH TRANSDERMAL at 10:10

## 2022-10-24 RX ADMIN — IPRATROPIUM BROMIDE AND ALBUTEROL SULFATE 3 ML: 2.5; .5 SOLUTION RESPIRATORY (INHALATION) at 08:10

## 2022-10-24 RX ADMIN — FERROUS SULFATE TAB 325 MG (65 MG ELEMENTAL FE) 1 EACH: 325 (65 FE) TAB at 10:10

## 2022-10-24 RX ADMIN — CYPROHEPTADINE HYDROCHLORIDE 4 MG: 4 TABLET ORAL at 08:10

## 2022-10-24 RX ADMIN — SUCRALFATE 1 G: 1 TABLET ORAL at 10:10

## 2022-10-24 RX ADMIN — IPRATROPIUM BROMIDE AND ALBUTEROL SULFATE 3 ML: 2.5; .5 SOLUTION RESPIRATORY (INHALATION) at 07:10

## 2022-10-24 RX ADMIN — THERA TABS 1 TABLET: TAB at 10:10

## 2022-10-24 RX ADMIN — PANTOPRAZOLE SODIUM 40 MG: 40 TABLET, DELAYED RELEASE ORAL at 08:10

## 2022-10-24 RX ADMIN — METOPROLOL SUCCINATE 50 MG: 50 TABLET, FILM COATED, EXTENDED RELEASE ORAL at 10:10

## 2022-10-24 RX ADMIN — TRAMADOL HYDROCHLORIDE 50 MG: 50 TABLET, COATED ORAL at 07:10

## 2022-10-24 RX ADMIN — TRAMADOL HYDROCHLORIDE 50 MG: 50 TABLET, COATED ORAL at 01:10

## 2022-10-24 RX ADMIN — APIXABAN 5 MG: 5 TABLET, FILM COATED ORAL at 08:10

## 2022-10-24 RX ADMIN — SODIUM CHLORIDE, PRESERVATIVE FREE 10 ML: 5 INJECTION INTRAVENOUS at 05:10

## 2022-10-24 RX ADMIN — IPRATROPIUM BROMIDE AND ALBUTEROL SULFATE 3 ML: 2.5; .5 SOLUTION RESPIRATORY (INHALATION) at 12:10

## 2022-10-24 RX ADMIN — ATORVASTATIN CALCIUM 40 MG: 40 TABLET, FILM COATED ORAL at 10:10

## 2022-10-24 RX ADMIN — TRAMADOL HYDROCHLORIDE 50 MG: 50 TABLET, COATED ORAL at 08:10

## 2022-10-24 RX ADMIN — Medication: at 08:10

## 2022-10-24 RX ADMIN — IPRATROPIUM BROMIDE AND ALBUTEROL SULFATE 3 ML: 2.5; .5 SOLUTION RESPIRATORY (INHALATION) at 01:10

## 2022-10-24 NOTE — PT/OT/SLP EVAL
"Speech Language Pathology Department  Clinical Swallow Evaluation    Patient Name:  Nimo Dill   MRN:  [id number]  Admitting Diagnosis: recent CVA    Recommendations:     General recommendations:   SLP to f/u x1 regarding tolerance  Diet recommendations:  Regular Diet - IDDSI Level 7, Liquid Diet Level: Thin liquids - IDDSI Level 0   Swallow strategies/precautions: medications per patient preference  Precautions: Standard, fall    History:     Past Medical History:   Diagnosis Date    Common bile duct dilatation     Epigastric abdominal pain     Gastric ulcer, acute with hemorrhage and perforation     PUD (peptic ulcer disease)        Past Surgical History:   Procedure Laterality Date    APPENDECTOMY       SECTION, CLASSIC      CHOLECYSTECTOMY      Distal gastrectomy      ESOPHAGOGASTRODUODENOSCOPY N/A 10/10/2022    Procedure: EGD;  Surgeon: Nati Smith MD;  Location: [facility] ENDOSCOPY;  Service: Gastroenterology;  Laterality: N/A;    GASTRECTOMY      HYSTERECTOMY      INSERTION OF IMPLANTABLE LOOP RECORDER N/A 10/21/2022    Procedure: Insertion, Implantable Loop Recorder;  Surgeon: Chas Gibson MD;  Location: [facility] CATH LAB;  Service: Cardiology;  Laterality: N/A;    Rectovaginal fistula repair      TONSILLECTOMY      VAGOTOMY AND PYLOROPLASTY         Home Diet: Regular and thin liquids  Current Method of Nutrition: PO diet      Patient complaint: pt reports, "I told the doctor my heart rate goes up when I eat."    Subjective     Patient awake and alert.    Patient goals: to get better     Pain/Comfort: Pain Rating 1: 0/10    Respiratory Status: nasal cannula    Objective:     Oral Musculature Evaluation  Oral Musculature: WFL  Dentition: present and adequate  Secretion Management: adequate  Mucosal Quality: good  Oral Labial Strength and Mobility: WFL  Lingual Strength and Mobility: WFL    Consistency Fed By Oral Symptoms Pharyngeal Symptoms   Thin liquid by cup Self None None   Chewable solid Self None " None   Thin liquid by straw Self None None     Assessment:     The pt presents with oropharyngeal swallowing WFL.  Skilled SLP services not warranted, please reconsult as needed.    Goals:   Multidisciplinary Problems       SLP Goals       Not on file                    Plan:     Patient to be seen:      Plan of Care expires:     Plan of Care reviewed with:  patient   SLP Follow-Up:  Yes      Time Tracking:     SLP Treatment Date:   10/24/22  Speech Start Time:  1000  Speech Stop Time:  1015     Speech Total Time (min):  15 min    Billable minutes:  Swallow and Oral Function Evaluation, 15 min      10/24/2022

## 2022-10-24 NOTE — PT/OT/SLP PROGRESS
Occupational Therapy   Treatment    Name: Nimo Dill  MRN: [id number]  Admitting Diagnosis:  <principal problem not specified>  3 Days Post-Op    Recommendations:     Discharge Recommendations: rehabilitation facility  Discharge Equipment Recommendations:  other (see comments) (shower chair vs. ETTB)  Barriers to discharge:  None    Assessment:     Nimo Dill is a 66 y.o. female with a significant Hx of recurrent syncope/fall/ataxic gait and a medical diagnosis of cerebellar infarcts likely embolic on aspirin, Eliquis.  She presents with weakness and SOB with activity with reduced recovery time (less than 1 min seated). Performance deficits affecting function are weakness, impaired endurance, impaired self care skills, gait instability, decreased coordination, impaired cardiopulmonary response to activity, impaired balance.     Rehab Prognosis:  Good; patient would benefit from acute skilled OT services to address these deficits and reach maximum level of function.       Plan:     Patient to be seen 5 x/week to address the above listed problems via self-care/home management, therapeutic activities, therapeutic exercises  Plan of Care Expires: 11/01/22  Plan of Care Reviewed with: patient    Subjective     Pain/Comfort:  Pain Rating 1: 0/10    Objective:     Communicated with: nsg prior to session.  Patient found up in chair with telemetry, oxygen, pulse ox (continuous), PICC line upon OT entry to room.    General Precautions: Standard, fall   Orthopedic Precautions:N/A   Braces: N/A  Respiratory Status: Nasal cannula, flow 0 L/min     Occupational Performance:     Bed Mobility:    Patient completed Sit to Supine with stand by assistance   Seated in chair - SpO2 94%    Functional Mobility/Transfers:  Patient completed Sit <> Stand Transfer with contact guard assistance and minimum assistance  with  rolling walker  - in chair, x1   Functional Mobility: Pt ambulated in room (~5 ft) from chair <> bed with CGA  "with RW - Mod-Max cues for RW manipulation with gait   Seated EOB - SpO2 85-86% after ambulating to EOB, with 30 sec-1 min seated recovery time before returning to 90s  Pt performed UE exercises seated at EOB - OH reaching with HHA x 10-15 reps, cross body punches x 10 each side, bicep curls x 10 reps B sides with water bottle, and triceps extensions in "T" position with water bottle x 7 reps (limited by weakness)    Activities of Daily Living:  Grooming: total assistance - Therapist brushed and clipped pt's hair with pt seated @ EOB     Treatment & Education:  Pt educated on POC and safety.     Patient left HOB elevated with all lines intact and call button in reach    GOALS:   Multidisciplinary Problems       Occupational Therapy Goals          Problem: Occupational Therapy    Goal Priority Disciplines Outcome Interventions   Occupational Therapy Goal     OT, PT/OT Ongoing, Progressing    Description: Goals to be met by: 11/01/22     Patient will increase functional independence with ADLs by performing:    UE Dressing with [place].  LE Dressing with Stand-by Assistance with RW.  Grooming while standing at sink with Stand-by Assistance with RW.  Toileting from bedside commode with Stand-by Assistance for hygiene and clothing management with RW.   Bathing from  shower chair/bench with Supervision.  Supine to sit with [place].  Toilet transfer to bedside commode with Stand-by Assistance with RW.                         Time Tracking:     OT Date of Treatment: 10/24/22  OT Start Time: 1153  OT Stop Time: 1223  OT Total Time (min): 30 min    Billable Minutes:Self Care/Home Management 15 min  Therapeutic Exercise 15 min    OT/SHERLYN: OT     SHERLYN Visit Number: 2  Note edited by ALESSIA Street    10/24/2022  "

## 2022-10-24 NOTE — PT/OT/SLP PROGRESS
Physical Therapy         Treatment        Nimo Dill   MRN: [id number]     PT Received On: 10/24/22  PT Start Time: 1008     PT Stop Time: 1020    PT Total Time (min): 12 min       Billable Minutes:  Gait [id number]  Total Minutes: 12    Treatment Type: Treatment  PT/PTA: PT     PTA Visit Number: 1       General Precautions: Standard, fall  Orthopedic Precautions: Orthopedic Precautions : N/A   Braces: Braces: N/A         Subjective:  Communicated with NSG prior to session.         Objective:  Patient found supine, with Patient found with: telemetry, pulse ox (continuous), PICC line    Functional Mobility:  Bed Mobility:   Supine to sit: Minimal Assistance   Scooting: Minimal Assistance    Balance:   Static Sit: FAIR+: Able to take MINIMAL challenges from all directions  Dynamic Sit:  FAIR+: Maintains balance through MINIMAL excursions of active trunk motion    Transfer Training:  Sit to stand:Minimal Assistance with Rolling Walker      Gait Training:  Pt ambulates x 7 feet with significant balance deficits to L side. Pt demos difficulting with swing phase of LLE.       Activity Tolerance:  Patient tolerated treatment well    Patient left up in chair with all lines intact, call button in reach, and NSG notified.    Assessment:  Nimo Dill is a 66 y.o. female with a medical diagnosis of <principal problem not specified>. She presents with significant balance deficits, gait deficits, and impaired functional mobility.    Rehab potential is good.    Activity tolerance: Good    Discharge recommendations: Discharge Facility/Level of Care Needs: rehabilitation facility     Equipment recommendations: Equipment Needed After Discharge: walker, rolling     GOALS:   Multidisciplinary Problems       Physical Therapy Goals          Problem: Physical Therapy    Goal Priority Disciplines Outcome Goal Variances Interventions   Physical Therapy Goal     PT, PT/OT Ongoing, Progressing     Description: Goals to be met by:  22     Patient will increase functional independence with mobility by performin. Supine to sit with Modified [place]  2. Sit to stand transfer with Modified [place]  3. Gait  x 200 feet with Modified [place] using Rolling Walker.                          PLAN:    Patient to be seen 5 x/week  to address the above listed problems via gait training, therapeutic activities, therapeutic exercises  Plan of Care expires: 22  Plan of Care reviewed with: patient         10/24/2022

## 2022-10-24 NOTE — PROGRESS NOTES
[facility]  Hospital Medicine Progress Note        Chief Complaint: Inpatient Follow-up    Subjective  A 66-year-old female with significant history of recurrent syncope/fall/ataxic gait.  Diagnosed with the cerebellar infarcts likely embolic on aspirin, Eliquis.  Patient awaiting LINQ placement.  She was admitted to our service and was discharged on 10/11 after being diagnosed with CVA.  Patient admitted hospitalist medicine service on 10/17 again with recurrent syncopal/collapse episodes.  During previous hospitalization patient was significantly anemic, she underwent GI evaluation with EGD showing some ulceration at gastrectomy anastomotic site with no active bleeding.  During previous hospitalization therapy services had recommended skilled nursing facility placement, but the patient refused and went home.  Given persistent/recurrent syncope/collapse decided to repeat MRI.  MRI showed evolving CVA-bilateral cerebral/cerebellar.  Neurology consulted given ongoing symptoms.  CT chest, abdomen, pelvis also ordered given significant weight loss and smoking history.  No occult malignancy.  But consistent with pneumonia.  Initiated appropriate antibiotics.  Home anti thrombotics including Eliquis resumed    Interval Hx:   Seen and examined patient.  Afebrile vitals stable and hemodynamically stable.  She was complaining of shortness of breath overnight she states that she is having more shortness of breath when she is eating.    Objective/physical exam:  General: In no acute distress, afebrile  Chest:  Few scattered wheezes   Heart: S1, S2, no appreciable murmur  Abdomen: Soft, nontender, BS +  MSK: Warm, no lower extremity edema, no clubbing or cyanosis  Neurologic: Alert and oriented x4,     VITAL SIGNS: 24 HRS MIN & MAX LAST   Temp  Min: 97.9 °F (36.6 °C)  Max: 99.3 °F (37.4 °C) 97.9 °F (36.6 °C)   BP  Min: 95/57  Max: 110/66 98/62   Pulse  Min: 77  Max: 93  93   Resp  Min: 15  Max: 20  15   SpO2  Min: 91 %  Max: 99 % 99 %       Recent Labs   Lab 10/23/22  0535   WBC 14.7*   RBC 3.47*   HGB 9.4*   HCT 31.2*   MCV 89.9   MCH 27.1   MCHC 30.1*   RDW 19.3*      MPV 10.3           Recent Labs   Lab 10/18/22  0917 10/19/22  0447 10/23/22  0535   NA  --    < > 137   K  --    < > 4.3   CO2  --    < > 27   BUN  --    < > 21.5*   CREATININE  --    < > 0.79   CALCIUM  --    < > 8.3*   PH 7.39  --   --    MG  --    < > 1.40*   ALBUMIN  --    < > 1.9*   ALKPHOS  --    < > 93   ALT  --    < > 24   AST  --    < > 44*   BILITOT  --    < > 0.2    < > = values in this interval not displayed.            Microbiology Results (last 7 days)       ** No results found for the last 168 hours. **             Scheduled Med:   albuterol-ipratropium  3 mL Nebulization Q6H    amLODIPine  5 mg Oral Daily    apixaban  5 mg Oral BID    ARIPiprazole  10 mg Oral Daily    atorvastatin  40 mg Oral Daily    cyproheptadine  4 mg Oral BID    ferrous sulfate  1 tablet Oral Daily    metoprolol succinate  50 mg Oral Daily    miconazole NITRATE 2 %   Topical (Top) BID    multivitamin  1 tablet Oral Daily    nicotine  1 patch Transdermal Daily    pantoprazole  40 mg Oral BID    sodium chloride 0.9%  10 mL Intravenous Q6H    sucralfate  1 g Oral QID    zinc oxide-cod liver oil   Topical (Top) BID          Assessment/Plan:  Recurrent syncope/collapse  Subacute CVA-bilateral cerebral/cerebellar  Abnormal gait  Right-sided pneumonia-rule out aspiration  Pulmonary cachexia  COPD with acute exacerbation   Acute on chronic hypoxemic/hypercapnic respiratory failure  Essential HTN-accelerated   HLD   Chronic anemia  Gastric ulcer  Chronic tobacco use  Prophylaxis    - will get chest x-ray, speech pathology evaluation.  Monitor her labs.  - her goal of oxygen he is 88% and above is okay she is not in respiratory distress respiratory rate is around 20 he is she will not need oxygen unless saturation drops below 88%.    -  Discussed with  neurology they are recommending Eliquis 5 mg b.I.d. at this time due to distribution of the stroke suggesting embolic and suspicion of Afib cardiac reasons are very high, patient will also get leg placement for definitive diagnoses.  -   DC aspirin since no history of stent.  -   Discussed with the patient about fall risk and placement.  MRI brain was repeated 10/18-no new stroke, Confirms evolving bilateral cerebral/cerebellar CVAs  Continue antibiotics for right-sided pneumonia   -   Discontinue IV fluids.  Patient reported unintentional weight loss, CT chest, abdomen/pelvis with C, no malignancy    We will discontinue steroids continue bronchodilators, she looks very comfortable and there is no wheezing.  Supplemental oxygen to keep saturations more than 90%     Although D-dimer was elevated V/Q scan and bilateral lower extremity Doppler ultrasound is negative.  It was most likely due to inflammatory process.  Continue PPI, Carafate for history of gastric anastomotic ulcer   Patient will need Protonix daily since she is on Eliquis now.  Continue other home meds-aripiprazole, statin, iron, Toprol-XL, multivitamin   Nicotine patch   BP accelerated occasionally and therefore  add amlodipine  DVT prophylaxis-on Eliquis    Case management consult for rehab placement    Julián Erwin MD   10/24/2022

## 2022-10-24 NOTE — PLAN OF CARE
Aldo was notified by Gisella with [facility] that pt can be admitted today. Per dr. Erwin, pt needs a speech evaluation. Pt's oxygen is also not stable at this time.

## 2022-10-24 NOTE — PLAN OF CARE
Problem: Adult Inpatient Plan of Care  Goal: Plan of Care Review  Outcome: Ongoing, Progressing  Flowsheets (Taken 10/24/2022 0022)  Plan of Care Reviewed With: patient  Goal: Patient-Specific Goal (Individualized)  Outcome: Ongoing, Progressing  Goal: Absence of Hospital-Acquired Illness or Injury  Outcome: Ongoing, Progressing  Intervention: Identify and Manage Fall Risk  Flowsheets (Taken 10/24/2022 0022)  Safety Promotion/Fall Prevention:   assistive device/personal item within reach   commode/urinal/bedpan at bedside   Fall Risk reviewed with patient/family   bedside commode chair   Fall Risk signage in place   nonskid shoes/socks when out of bed   side rails raised x 2  Intervention: Prevent Skin Injury  Flowsheets (Taken 10/24/2022 0022)  Body Position:   side-lying   left  Skin Protection:   adhesive use limited   protective footwear used   tubing/devices free from skin contact  Intervention: Prevent and Manage VTE (Venous Thromboembolism) Risk  Flowsheets (Taken 10/24/2022 0022)  Activity Management: Sitting at edge of bed - L2  VTE Prevention/Management:   bleeding precations maintained   bleeding risk assessed  Range of Motion: ROM (range of motion) performed  Intervention: Prevent Infection  Flowsheets (Taken 10/24/2022 0022)  Infection Prevention:   hand hygiene promoted   single patient room provided   rest/sleep promoted   equipment surfaces disinfected  Goal: Optimal Comfort and Wellbeing  Outcome: Ongoing, Progressing  Intervention: Monitor Pain and Promote Comfort  Flowsheets (Taken 10/24/2022 0022)  Pain Management Interventions:   care clustered   medication offered   position adjusted   quiet environment facilitated  Intervention: Provide Person-Centered Care  Flowsheets (Taken 10/24/2022 0022)  Trust Relationship/Rapport:   care explained   choices provided   empathic listening provided   emotional support provided   questions answered   questions encouraged   thoughts/feelings  acknowledged  Goal: Readiness for Transition of Care  Outcome: Ongoing, Progressing     Problem: Fluid Imbalance (Pneumonia)  Goal: Fluid Balance  Outcome: Ongoing, Progressing     Problem: Infection (Pneumonia)  Goal: Resolution of Infection Signs and Symptoms  Outcome: Ongoing, Progressing     Problem: Respiratory Compromise (Pneumonia)  Goal: Effective Oxygenation and Ventilation  Outcome: Ongoing, Progressing     Problem: Infection  Goal: Absence of Infection Signs and Symptoms  Outcome: Ongoing, Progressing     Problem: Impaired Wound Healing  Goal: Optimal Wound Healing  Outcome: Ongoing, Progressing  Intervention: Promote Wound Healing  Flowsheets (Taken 10/24/2022 0022)  Oral Nutrition Promotion: calorie-dense foods provided  Sleep/Rest Enhancement:   awakenings minimized   consistent schedule promoted   regular sleep/rest pattern promoted  Activity Management: Sitting at edge of bed - L2  Pain Management Interventions:   care clustered   medication offered   position adjusted   quiet environment facilitated     Problem: Fall Injury Risk  Goal: Absence of Fall and Fall-Related Injury  Outcome: Ongoing, Progressing  Intervention: Identify and Manage Contributors  Flowsheets (Taken 10/24/2022 0022)  Self-Care Promotion:   independence encouraged   BADL personal objects within reach  Medication Review/Management:   medications reviewed   high-risk medications identified  Intervention: Promote Injury-Free Environment  Flowsheets (Taken 10/24/2022 0022)  Safety Promotion/Fall Prevention:   assistive device/personal item within reach   commode/urinal/bedpan at bedside   Fall Risk reviewed with patient/family   bedside commode chair   Fall Risk signage in place   nonskid shoes/socks when out of bed   side rails raised x 2     Problem: Skin Injury Risk Increased  Goal: Skin Health and Integrity  Outcome: Ongoing, Progressing     Problem: Adjustment to Illness (Stroke, Ischemic/Transient Ischemic Attack)  Goal:  Optimal Coping  Outcome: Ongoing, Progressing     Problem: Bowel Elimination Impaired (Stroke, Ischemic/Transient Ischemic Attack)  Goal: Effective Bowel Elimination  Outcome: Ongoing, Progressing     Problem: Cerebral Tissue Perfusion (Stroke, Ischemic/Transient Ischemic Attack)  Goal: Optimal Cerebral Tissue Perfusion  Outcome: Ongoing, Progressing     Problem: Cognitive Impairment (Stroke, Ischemic/Transient Ischemic Attack)  Goal: Optimal Cognitive Function  Outcome: Ongoing, Progressing     Problem: Communication Impairment (Stroke, Ischemic/Transient Ischemic Attack)  Goal: Improved Communication Skills  Outcome: Ongoing, Progressing     Problem: Functional Ability Impaired (Stroke, Ischemic/Transient Ischemic Attack)  Goal: Optimal Functional Ability  Outcome: Ongoing, Progressing     Problem: Respiratory Compromise (Stroke, Ischemic/Transient Ischemic Attack)  Goal: Effective Oxygenation and Ventilation  Outcome: Ongoing, Progressing     Problem: Sensorimotor Impairment (Stroke, Ischemic/Transient Ischemic Attack)  Goal: Improved Sensorimotor Function  Outcome: Ongoing, Progressing     Problem: Swallowing Impairment (Stroke, Ischemic/Transient Ischemic Attack)  Goal: Optimal Eating and Swallowing without Aspiration  Outcome: Ongoing, Progressing     Problem: Urinary Elimination Impaired (Stroke, Ischemic/Transient Ischemic Attack)  Goal: Effective Urinary Elimination  Outcome: Ongoing, Progressing

## 2022-10-25 LAB
ALBUMIN SERPL-MCNC: 2 GM/DL (ref 3.4–4.8)
ALBUMIN/GLOB SERPL: 0.9 RATIO (ref 1.1–2)
ALP SERPL-CCNC: 93 UNIT/L (ref 40–150)
ALT SERPL-CCNC: 15 UNIT/L (ref 0–55)
AST SERPL-CCNC: 16 UNIT/L (ref 5–34)
BASOPHILS # BLD AUTO: 0.13 X10(3)/MCL (ref 0–0.2)
BASOPHILS NFR BLD AUTO: 0.9 %
BILIRUBIN DIRECT+TOT PNL SERPL-MCNC: 0.3 MG/DL
BUN SERPL-MCNC: 20.9 MG/DL (ref 9.8–20.1)
CALCIUM SERPL-MCNC: 8.3 MG/DL (ref 8.4–10.2)
CHLORIDE SERPL-SCNC: 102 MMOL/L (ref 98–107)
CO2 SERPL-SCNC: 27 MMOL/L (ref 23–31)
CREAT SERPL-MCNC: 0.76 MG/DL (ref 0.55–1.02)
EOSINOPHIL # BLD AUTO: 0.31 X10(3)/MCL (ref 0–0.9)
EOSINOPHIL NFR BLD AUTO: 2 %
ERYTHROCYTE [DISTWIDTH] IN BLOOD BY AUTOMATED COUNT: 19.9 % (ref 11.5–17)
GFR SERPLBLD CREATININE-BSD FMLA CKD-EPI: >60 MLS/MIN/1.73/M2
GLOBULIN SER-MCNC: 2.3 GM/DL (ref 2.4–3.5)
GLUCOSE SERPL-MCNC: 94 MG/DL (ref 82–115)
HCT VFR BLD AUTO: 29.1 % (ref 37–47)
HGB BLD-MCNC: 8.8 GM/DL (ref 12–16)
IMM GRANULOCYTES # BLD AUTO: 0.63 X10(3)/MCL (ref 0–0.04)
IMM GRANULOCYTES NFR BLD AUTO: 4.1 %
LYMPHOCYTES # BLD AUTO: 1.98 X10(3)/MCL (ref 0.6–4.6)
LYMPHOCYTES NFR BLD AUTO: 13 %
MCH RBC QN AUTO: 27 PG (ref 27–31)
MCHC RBC AUTO-ENTMCNC: 30.2 MG/DL (ref 33–36)
MCV RBC AUTO: 89.3 FL (ref 80–94)
MONOCYTES # BLD AUTO: 0.9 X10(3)/MCL (ref 0.1–1.3)
MONOCYTES NFR BLD AUTO: 5.9 %
NEUTROPHILS # BLD AUTO: 11.3 X10(3)/MCL (ref 2.1–9.2)
NEUTROPHILS NFR BLD AUTO: 74.1 %
NRBC BLD AUTO-RTO: 0 %
PLATELET # BLD AUTO: 330 X10(3)/MCL (ref 130–400)
PMV BLD AUTO: 10.8 FL (ref 7.4–10.4)
POTASSIUM SERPL-SCNC: 4.5 MMOL/L (ref 3.5–5.1)
PROT SERPL-MCNC: 4.3 GM/DL (ref 5.8–7.6)
RBC # BLD AUTO: 3.26 X10(6)/MCL (ref 4.2–5.4)
SODIUM SERPL-SCNC: 137 MMOL/L (ref 136–145)
WBC # SPEC AUTO: 15.3 X10(3)/MCL (ref 4.5–11.5)

## 2022-10-25 PROCEDURE — 80053 COMPREHEN METABOLIC PANEL: CPT | Performed by: STUDENT IN AN ORGANIZED HEALTH CARE EDUCATION/TRAINING PROGRAM

## 2022-10-25 PROCEDURE — A4216 STERILE WATER/SALINE, 10 ML: HCPCS | Performed by: INTERNAL MEDICINE

## 2022-10-25 PROCEDURE — 27000221 HC OXYGEN, UP TO 24 HOURS

## 2022-10-25 PROCEDURE — 97535 SELF CARE MNGMENT TRAINING: CPT

## 2022-10-25 PROCEDURE — 99900031 HC PATIENT EDUCATION (STAT)

## 2022-10-25 PROCEDURE — 25000003 PHARM REV CODE 250: Performed by: INTERNAL MEDICINE

## 2022-10-25 PROCEDURE — 21400001 HC TELEMETRY ROOM

## 2022-10-25 PROCEDURE — 25000242 PHARM REV CODE 250 ALT 637 W/ HCPCS: Performed by: INTERNAL MEDICINE

## 2022-10-25 PROCEDURE — S4991 NICOTINE PATCH NONLEGEND: HCPCS | Performed by: INTERNAL MEDICINE

## 2022-10-25 PROCEDURE — 97116 GAIT TRAINING THERAPY: CPT

## 2022-10-25 PROCEDURE — 94761 N-INVAS EAR/PLS OXIMETRY MLT: CPT

## 2022-10-25 PROCEDURE — 25000003 PHARM REV CODE 250: Performed by: NURSE PRACTITIONER

## 2022-10-25 PROCEDURE — 85025 COMPLETE CBC W/AUTO DIFF WBC: CPT | Performed by: STUDENT IN AN ORGANIZED HEALTH CARE EDUCATION/TRAINING PROGRAM

## 2022-10-25 PROCEDURE — 94640 AIRWAY INHALATION TREATMENT: CPT

## 2022-10-25 PROCEDURE — 97530 THERAPEUTIC ACTIVITIES: CPT

## 2022-10-25 PROCEDURE — 36415 COLL VENOUS BLD VENIPUNCTURE: CPT | Performed by: STUDENT IN AN ORGANIZED HEALTH CARE EDUCATION/TRAINING PROGRAM

## 2022-10-25 RX ADMIN — TRAMADOL HYDROCHLORIDE 50 MG: 50 TABLET, COATED ORAL at 03:10

## 2022-10-25 RX ADMIN — APIXABAN 5 MG: 5 TABLET, FILM COATED ORAL at 09:10

## 2022-10-25 RX ADMIN — PANTOPRAZOLE SODIUM 40 MG: 40 TABLET, DELAYED RELEASE ORAL at 09:10

## 2022-10-25 RX ADMIN — TRAMADOL HYDROCHLORIDE 50 MG: 50 TABLET, COATED ORAL at 09:10

## 2022-10-25 RX ADMIN — IPRATROPIUM BROMIDE AND ALBUTEROL SULFATE 3 ML: 2.5; .5 SOLUTION RESPIRATORY (INHALATION) at 07:10

## 2022-10-25 RX ADMIN — METOPROLOL SUCCINATE 50 MG: 50 TABLET, FILM COATED, EXTENDED RELEASE ORAL at 09:10

## 2022-10-25 RX ADMIN — CYPROHEPTADINE HYDROCHLORIDE 4 MG: 4 TABLET ORAL at 09:10

## 2022-10-25 RX ADMIN — NICOTINE 1 PATCH: 14 PATCH TRANSDERMAL at 09:10

## 2022-10-25 RX ADMIN — MICONAZOLE NITRATE 2 % TOPICAL POWDER: at 09:10

## 2022-10-25 RX ADMIN — SUCRALFATE 1 G: 1 TABLET ORAL at 01:10

## 2022-10-25 RX ADMIN — ATORVASTATIN CALCIUM 40 MG: 40 TABLET, FILM COATED ORAL at 09:10

## 2022-10-25 RX ADMIN — SUCRALFATE 1 G: 1 TABLET ORAL at 09:10

## 2022-10-25 RX ADMIN — SODIUM CHLORIDE, PRESERVATIVE FREE 10 ML: 5 INJECTION INTRAVENOUS at 01:10

## 2022-10-25 RX ADMIN — IPRATROPIUM BROMIDE AND ALBUTEROL SULFATE 3 ML: 2.5; .5 SOLUTION RESPIRATORY (INHALATION) at 01:10

## 2022-10-25 RX ADMIN — SODIUM CHLORIDE, PRESERVATIVE FREE 10 ML: 5 INJECTION INTRAVENOUS at 05:10

## 2022-10-25 RX ADMIN — ARIPIPRAZOLE 10 MG: 5 TABLET ORAL at 09:10

## 2022-10-25 RX ADMIN — Medication: at 09:10

## 2022-10-25 RX ADMIN — THERA TABS 1 TABLET: TAB at 09:10

## 2022-10-25 RX ADMIN — FERROUS SULFATE TAB 325 MG (65 MG ELEMENTAL FE) 1 EACH: 325 (65 FE) TAB at 09:10

## 2022-10-25 RX ADMIN — TRAMADOL HYDROCHLORIDE 50 MG: 50 TABLET, COATED ORAL at 04:10

## 2022-10-25 RX ADMIN — SODIUM CHLORIDE, PRESERVATIVE FREE 10 ML: 5 INJECTION INTRAVENOUS at 12:10

## 2022-10-25 NOTE — PT/OT/SLP PROGRESS
SLP following up regarding diet tolerance, nursing reports no difficulty.  SLP to sign off, please reconsult as needed.

## 2022-10-25 NOTE — PT/OT/SLP PROGRESS
Physical Therapy         Treatment        Nimo Dill   MRN: [id number]     PT Received On: 10/25/22  PT Start Time: 1016     PT Stop Time: 1030    PT Total Time (min): 14 min       Billable Minutes:  Gait [id number]  Total Minutes: 14    Treatment Type: Treatment  PT/PTA: PT     PTA Visit Number: 2       General Precautions: Standard, fall  Orthopedic Precautions: Orthopedic Precautions : N/A   Braces: Braces: N/A         Subjective:  Communicated with NSG prior to session.         Objective:  Patient found supine, with Patient found with: telemetry, pulse ox (continuous), PICC line    Functional Mobility:  Bed Mobility:   Supine to sit: Minimal Assistance   Scooting: Contact Guard Assistance    Balance:   Static Sit: FAIR: Maintains without assist, but unable to take any challenges   Dynamic Sit:  FAIR: Cannot move trunk without losing balance    Transfer Training:  Sit to stand:Minimal Assistance with Rolling Walker x2 trials      Gait Training:  Pt ambulates x 10 feet with RW and min A. Pt demos decreased motor control of LLE with ambultion. Her swing phase has improved with increased step height.    Activity Tolerance:  Patient tolerated treatment well    Patient left up in chair with all lines intact and call button in reach.    Assessment:  Nimo Dill is a 66 y.o. female with a medical diagnosis of cerebellar CVA. She presents with impaired balance, decreased motor control, gait instability, and impaired functional mobility.    Rehab potential is excellent.    Activity tolerance: Excellent    Discharge recommendations: Discharge Facility/Level of Care Needs: rehabilitation facility     Equipment recommendations: Equipment Needed After Discharge: walker, rolling     GOALS:   Multidisciplinary Problems       Physical Therapy Goals          Problem: Physical Therapy    Goal Priority Disciplines Outcome Goal Variances Interventions   Physical Therapy Goal     PT, PT/OT Ongoing, Progressing     Description:  Goals to be met by: 22     Patient will increase functional independence with mobility by performin. Supine to sit with Modified [place]  2. Sit to stand transfer with Modified [place]  3. Gait  x 200 feet with Modified [place] using Rolling Walker.                          PLAN:    Patient to be seen daily  to address the above listed problems via gait training, therapeutic activities, therapeutic exercises  Plan of Care expires: 22  Plan of Care reviewed with: patient         10/25/2022

## 2022-10-25 NOTE — PROGRESS NOTES
[facility]  Hospital Medicine Progress Note        Chief Complaint: Inpatient Follow-up    Subjective  A 66-year-old female with significant history of recurrent syncope/fall/ataxic gait.  Diagnosed with the cerebellar infarcts likely embolic on aspirin, Eliquis.  Patient awaiting LINQ placement.  She was admitted to our service and was discharged on 10/11 after being diagnosed with CVA.  Patient admitted hospitalist medicine service on 10/17 again with recurrent syncopal/collapse episodes.  During previous hospitalization patient was significantly anemic, she underwent GI evaluation with EGD showing some ulceration at gastrectomy anastomotic site with no active bleeding.  During previous hospitalization therapy services had recommended skilled nursing facility placement, but the patient refused and went home.  Given persistent/recurrent syncope/collapse decided to repeat MRI.  MRI showed evolving CVA-bilateral cerebral/cerebellar.  Neurology consulted given ongoing symptoms.  CT chest, abdomen, pelvis also ordered given significant weight loss and smoking history.  No occult malignancy.  But consistent with pneumonia.  Initiated appropriate antibiotics.  Home anti thrombotics including Eliquis resumed    Interval Hx:   She is breathing more comfortably today.  Denying pain or discomfort    Objective/physical exam:  General: In no acute distress, afebrile  Chest:  Few scattered wheezes   Heart: S1, S2, no appreciable murmur  Abdomen: Soft, nontender, BS +  MSK: Warm, no lower extremity edema, no clubbing or cyanosis  Neurologic: Alert and oriented x4,     VITAL SIGNS: 24 HRS MIN & MAX LAST   Temp  Min: 98 °F (36.7 °C)  Max: 99 °F (37.2 °C) 98.2 °F (36.8 °C)   BP  Min: 93/56  Max: 102/65 100/65   Pulse  Min: 74  Max: 103  103   Resp  Min: 17  Max: 22 17   SpO2  Min: 62 %  Max: 100 % 96 %       Recent Labs   Lab 10/25/22  0408   WBC 15.3*   RBC 3.26*   HGB 8.8*   HCT 29.1*   MCV 89.3    MCH 27.0   MCHC 30.2*   RDW 19.9*      MPV 10.8*           Recent Labs   Lab 10/23/22  0535 10/25/22  0408    137   K 4.3 4.5   CO2 27 27   BUN 21.5* 20.9*   CREATININE 0.79 0.76   CALCIUM 8.3* 8.3*   MG 1.40*  --    ALBUMIN 1.9* 2.0*   ALKPHOS 93 93   ALT 24 15   AST 44* 16   BILITOT 0.2 0.3            Microbiology Results (last 7 days)       ** No results found for the last 168 hours. **             Scheduled Med:   albuterol-ipratropium  3 mL Nebulization Q6H    amLODIPine  5 mg Oral Daily    apixaban  5 mg Oral BID    ARIPiprazole  10 mg Oral Daily    atorvastatin  40 mg Oral Daily    cyproheptadine  4 mg Oral BID    ferrous sulfate  1 tablet Oral Daily    metoprolol succinate  50 mg Oral Daily    miconazole NITRATE 2 %   Topical (Top) BID    multivitamin  1 tablet Oral Daily    nicotine  1 patch Transdermal Daily    pantoprazole  40 mg Oral BID    sodium chloride 0.9%  10 mL Intravenous Q6H    sucralfate  1 g Oral QID    zinc oxide-cod liver oil   Topical (Top) BID          Assessment/Plan:  Recurrent syncope/collapse  Subacute CVA-bilateral cerebral/cerebellar  Abnormal gait  Right-sided pneumonia-rule out aspiration  Pulmonary cachexia  COPD with acute exacerbation   Acute on chronic hypoxemic/hypercapnic respiratory failure  Essential HTN-accelerated   HLD   Chronic anemia  Gastric ulcer  Chronic tobacco use  Prophylaxis    Speech adjusted her diet.  And chest x-ray is unremarkable.  - her goal of oxygen he is 88% and above is okay she is not in respiratory distress respiratory rate is around 20 he is she will not need oxygen unless saturation drops below 88%.    -  Discussed with neurology they are recommending Eliquis 5 mg b.I.d. at this time due to distribution of the stroke suggesting embolic and suspicion of Afib cardiac reasons are very high, patient will also get leg placement for definitive diagnoses.  -   DC aspirin since no history of stent.  -   Discussed with the patient about fall  risk and placement.  MRI brain was repeated 10/18-no new stroke, Confirms evolving bilateral cerebral/cerebellar CVAs  Continue antibiotics for right-sided pneumonia   -   Discontinue IV fluids.  Patient reported unintentional weight loss, CT chest, abdomen/pelvis with C, no malignancy    We will discontinue steroids continue bronchodilators, she looks very comfortable and there is no wheezing.  Supplemental oxygen to keep saturations more than 90%     Although D-dimer was elevated V/Q scan and bilateral lower extremity Doppler ultrasound is negative.  It was most likely due to inflammatory process.  Continue PPI, Carafate for history of gastric anastomotic ulcer   Patient will need Protonix daily since she is on Eliquis now.  Continue other home meds-aripiprazole, statin, iron, Toprol-XL, multivitamin   Nicotine patch   BP accelerated occasionally and therefore  add amlodipine  DVT prophylaxis-on Eliquis    Case management consult for rehab placement    Julián Erwin MD   10/25/2022

## 2022-10-25 NOTE — PT/OT/SLP PROGRESS
Occupational Therapy   Treatment    Name: Nimo Dill  MRN: [id number]  Admitting Diagnosis:  recurrent syncope/fall/ataxic gait, Subacute CVA-bilateral cerebral/cerebellar, COPD with acute exacerbation, s/p LINQ placement, 4 Days Post-Op    Recommendations:     Discharge Recommendations: rehabilitation facility  Discharge Equipment Recommendations:  other (see comments) (shower chair vs. ETTB)  Barriers to discharge:       Assessment:     Nimo Dill is a 66 y.o. female with a medical diagnosis of recurrent syncope/fall/ataxic gait, Subacute CVA-bilateral cerebral/cerebellar, COPD with acute exacerbation, s/p LINQ placement.  She presents with generalized weakness, decreased balance with posterior leaning, decreased activity tolerance. Ptwith questionable confusion,insisting she had previously participated in OT and PT today, declined any mobility with OT, declined to void on toilet with OT requested after beginning to soil brief.    Rehab Prognosis:  Good; patient would benefit from acute skilled OT services to address these deficits and reach maximum level of function.       Plan:     Patient to be seen 5 x/week to address the above listed problems via self-care/home management, therapeutic activities, therapeutic exercises  Plan of Care Expires: 11/01/22  Plan of Care Reviewed with: patient    Subjective     Pain/Comfort:  Pain Rating 1: 7/10 (neck and back)    Objective:     Communicated with:  prior to session.  Patient found up in chair with peripheral IV, telemetry, pulse ox (continuous) upon OT entry to room.    General Precautions: Standard, fall   Orthopedic Precautions:N/A   Braces:    Respiratory Status: Room air  O2 sat 92% at rest, O2 sat 88% with activity, R 102     Occupational Performance:     Bed Mobility:    NT, pt sitting uic     Functional Mobility/Transfers:  Patient completed Sit <> Stand Transfer with minimum assistance  with  rolling walker x 3 trials  Functional Mobility: static  standing balance min A due to posterior imbalance with extension of posture /arching back /anterior pelvic tilt.    Activities of Daily Living:  Upper Body Dressing: minimum assistance and pt req'd min a to norma pull over shirt   Lower Body Dressing: SBA to doff/norma B socks able to thread BLE into pant legs, req'd mod A to pull up over hips  Toileting: maximal assistance max A posterior pericleaning in standing +BM in brief, min A to doff brief, max A to norma brief      [facility] 6 Click ADL:      Treatment & Education:  Education on OT POC, inpatient rehab expectations    Patient left up in chair with all lines intact and call button in reach    GOALS:   Multidisciplinary Problems       Occupational Therapy Goals          Problem: Occupational Therapy    Goal Priority Disciplines Outcome Interventions   Occupational Therapy Goal     OT, PT/OT Ongoing, Progressing    Description: Goals to be met by: 11/01/22     Patient will increase functional independence with ADLs by performing:    UE Dressing with [place].  LE Dressing with Stand-by Assistance with RW.  Grooming while standing at sink with Stand-by Assistance with RW.  Toileting from bedside commode with Stand-by Assistance for hygiene and clothing management with RW.   Bathing from  shower chair/bench with Supervision.  Supine to sit with [place].  Toilet transfer to bedside commode with Stand-by Assistance with RW.                         Time Tracking:     OT Date of Treatment: 10/25/22  OT Start Time: 1051  OT Stop Time: 1131  OT Total Time (min): 40 min    Billable Minutes:Self Care/Home Management 2 units  Therapeutic Activity 1 unit          SHERLYN Visit Number: 3    10/25/2022

## 2022-10-25 NOTE — PLAN OF CARE
SW was notified that pt can be admitted tomorrow (10-26-22) to [facility].

## 2022-10-25 NOTE — PLAN OF CARE
JATINDER staffed with Dr. Erwin and pt's nurse, Alayna regarding admission to rehab today. JATINDER was informed that pt is requiring 2 liters of oxygen. JATINDER notified Gisella with [facility] and she is okay with admitting pt today. JATINDER obtained discharge order from Dr. Erwin and requested a COVID swab from pt's nurse, Alayna.

## 2022-10-26 LAB — HEMATOLOGIST REVIEW: NORMAL

## 2022-10-26 PROCEDURE — 36415 COLL VENOUS BLD VENIPUNCTURE: CPT | Performed by: STUDENT IN AN ORGANIZED HEALTH CARE EDUCATION/TRAINING PROGRAM

## 2022-10-26 PROCEDURE — S4991 NICOTINE PATCH NONLEGEND: HCPCS | Performed by: INTERNAL MEDICINE

## 2022-10-26 PROCEDURE — 27000221 HC OXYGEN, UP TO 24 HOURS

## 2022-10-26 PROCEDURE — 94640 AIRWAY INHALATION TREATMENT: CPT

## 2022-10-26 PROCEDURE — A4216 STERILE WATER/SALINE, 10 ML: HCPCS | Performed by: INTERNAL MEDICINE

## 2022-10-26 PROCEDURE — 25000003 PHARM REV CODE 250: Performed by: NURSE PRACTITIONER

## 2022-10-26 PROCEDURE — 85060 BLOOD SMEAR INTERPRETATION: CPT | Performed by: STUDENT IN AN ORGANIZED HEALTH CARE EDUCATION/TRAINING PROGRAM

## 2022-10-26 PROCEDURE — 25000242 PHARM REV CODE 250 ALT 637 W/ HCPCS: Performed by: INTERNAL MEDICINE

## 2022-10-26 PROCEDURE — 25000003 PHARM REV CODE 250: Performed by: INTERNAL MEDICINE

## 2022-10-26 PROCEDURE — 97116 GAIT TRAINING THERAPY: CPT

## 2022-10-26 PROCEDURE — 21400001 HC TELEMETRY ROOM

## 2022-10-26 PROCEDURE — 94761 N-INVAS EAR/PLS OXIMETRY MLT: CPT

## 2022-10-26 PROCEDURE — 97530 THERAPEUTIC ACTIVITIES: CPT

## 2022-10-26 RX ADMIN — SUCRALFATE 1 G: 1 TABLET ORAL at 08:10

## 2022-10-26 RX ADMIN — ATORVASTATIN CALCIUM 40 MG: 40 TABLET, FILM COATED ORAL at 09:10

## 2022-10-26 RX ADMIN — SUCRALFATE 1 G: 1 TABLET ORAL at 09:10

## 2022-10-26 RX ADMIN — TRAMADOL HYDROCHLORIDE 50 MG: 50 TABLET, COATED ORAL at 09:10

## 2022-10-26 RX ADMIN — IPRATROPIUM BROMIDE AND ALBUTEROL SULFATE 3 ML: 2.5; .5 SOLUTION RESPIRATORY (INHALATION) at 07:10

## 2022-10-26 RX ADMIN — CYPROHEPTADINE HYDROCHLORIDE 4 MG: 4 TABLET ORAL at 09:10

## 2022-10-26 RX ADMIN — MICONAZOLE NITRATE 2 % TOPICAL POWDER: at 08:10

## 2022-10-26 RX ADMIN — FERROUS SULFATE TAB 325 MG (65 MG ELEMENTAL FE) 1 EACH: 325 (65 FE) TAB at 09:10

## 2022-10-26 RX ADMIN — APIXABAN 5 MG: 5 TABLET, FILM COATED ORAL at 09:10

## 2022-10-26 RX ADMIN — MICONAZOLE NITRATE 2 % TOPICAL POWDER: at 09:10

## 2022-10-26 RX ADMIN — PANTOPRAZOLE SODIUM 40 MG: 40 TABLET, DELAYED RELEASE ORAL at 08:10

## 2022-10-26 RX ADMIN — ARIPIPRAZOLE 10 MG: 5 TABLET ORAL at 09:10

## 2022-10-26 RX ADMIN — SODIUM CHLORIDE, PRESERVATIVE FREE 10 ML: 5 INJECTION INTRAVENOUS at 06:10

## 2022-10-26 RX ADMIN — TRAMADOL HYDROCHLORIDE 50 MG: 50 TABLET, COATED ORAL at 03:10

## 2022-10-26 RX ADMIN — IPRATROPIUM BROMIDE AND ALBUTEROL SULFATE 3 ML: 2.5; .5 SOLUTION RESPIRATORY (INHALATION) at 01:10

## 2022-10-26 RX ADMIN — AMLODIPINE BESYLATE 5 MG: 5 TABLET ORAL at 09:10

## 2022-10-26 RX ADMIN — NICOTINE 1 PATCH: 14 PATCH TRANSDERMAL at 09:10

## 2022-10-26 RX ADMIN — SODIUM CHLORIDE, PRESERVATIVE FREE 10 ML: 5 INJECTION INTRAVENOUS at 05:10

## 2022-10-26 RX ADMIN — THERA TABS 1 TABLET: TAB at 09:10

## 2022-10-26 RX ADMIN — SUCRALFATE 1 G: 1 TABLET ORAL at 12:10

## 2022-10-26 RX ADMIN — CYPROHEPTADINE HYDROCHLORIDE 4 MG: 4 TABLET ORAL at 08:10

## 2022-10-26 RX ADMIN — Medication: at 09:10

## 2022-10-26 RX ADMIN — SUCRALFATE 1 G: 1 TABLET ORAL at 05:10

## 2022-10-26 RX ADMIN — Medication: at 08:10

## 2022-10-26 RX ADMIN — APIXABAN 5 MG: 5 TABLET, FILM COATED ORAL at 08:10

## 2022-10-26 RX ADMIN — SODIUM CHLORIDE, PRESERVATIVE FREE 10 ML: 5 INJECTION INTRAVENOUS at 12:10

## 2022-10-26 RX ADMIN — PANTOPRAZOLE SODIUM 40 MG: 40 TABLET, DELAYED RELEASE ORAL at 09:10

## 2022-10-26 RX ADMIN — METOPROLOL SUCCINATE 50 MG: 50 TABLET, FILM COATED, EXTENDED RELEASE ORAL at 09:10

## 2022-10-26 NOTE — PLAN OF CARE
Problem: Adult Inpatient Plan of Care  Goal: Plan of Care Review  Outcome: Ongoing, Progressing  Flowsheets (Taken 10/26/2022 0735)  Plan of Care Reviewed With: patient  Goal: Patient-Specific Goal (Individualized)  Outcome: Ongoing, Progressing  Goal: Absence of Hospital-Acquired Illness or Injury  Outcome: Ongoing, Progressing  Intervention: Identify and Manage Fall Risk  Flowsheets (Taken 10/26/2022 0735)  Safety Promotion/Fall Prevention:   assistive device/personal item within reach   side rails raised x 2   nonskid shoes/socks when out of bed  Intervention: Prevent Skin Injury  Flowsheets (Taken 10/26/2022 0735)  Body Position:   position changed independently   supine  Skin Protection: incontinence pads utilized  Intervention: Prevent and Manage VTE (Venous Thromboembolism) Risk  Flowsheets (Taken 10/26/2022 0735)  VTE Prevention/Management:   remove, assess skin, and reapply sequential compression device   fluids promoted  Goal: Optimal Comfort and Wellbeing  Outcome: Ongoing, Progressing  Goal: Readiness for Transition of Care  Outcome: Ongoing, Progressing     Problem: Fluid Imbalance (Pneumonia)  Goal: Fluid Balance  Outcome: Ongoing, Progressing     Problem: Infection (Pneumonia)  Goal: Resolution of Infection Signs and Symptoms  Outcome: Ongoing, Progressing     Problem: Respiratory Compromise (Pneumonia)  Goal: Effective Oxygenation and Ventilation  Outcome: Ongoing, Progressing  Intervention: Promote Airway Secretion Clearance  Flowsheets (Taken 10/26/2022 0735)  Cough And Deep Breathing: done independently per patient     Problem: Infection  Goal: Absence of Infection Signs and Symptoms  Outcome: Ongoing, Progressing     Problem: Impaired Wound Healing  Goal: Optimal Wound Healing  Outcome: Ongoing, Progressing     Problem: Fall Injury Risk  Goal: Absence of Fall and Fall-Related Injury  Outcome: Ongoing, Progressing  Intervention: Identify and Manage Contributors  Flowsheets (Taken 10/26/2022  0735)  Self-Care Promotion:   independence encouraged   meal set-up provided  Medication Review/Management: medications reviewed     Problem: Skin Injury Risk Increased  Goal: Skin Health and Integrity  Outcome: Ongoing, Progressing  Intervention: Optimize Skin Protection  Flowsheets (Taken 10/26/2022 5407)  Pressure Reduction Techniques: frequent weight shift encouraged  Pressure Reduction Devices: foam padding utilized  Skin Protection: incontinence pads utilized     Problem: Adjustment to Illness (Stroke, Ischemic/Transient Ischemic Attack)  Goal: Optimal Coping  Outcome: Ongoing, Progressing     Problem: Bowel Elimination Impaired (Stroke, Ischemic/Transient Ischemic Attack)  Goal: Effective Bowel Elimination  Outcome: Ongoing, Progressing     Problem: Cerebral Tissue Perfusion (Stroke, Ischemic/Transient Ischemic Attack)  Goal: Optimal Cerebral Tissue Perfusion  Outcome: Ongoing, Progressing     Problem: Cognitive Impairment (Stroke, Ischemic/Transient Ischemic Attack)  Goal: Optimal Cognitive Function  Outcome: Ongoing, Progressing     Problem: Communication Impairment (Stroke, Ischemic/Transient Ischemic Attack)  Goal: Improved Communication Skills  Outcome: Ongoing, Progressing     Problem: Functional Ability Impaired (Stroke, Ischemic/Transient Ischemic Attack)  Goal: Optimal Functional Ability  Outcome: Ongoing, Progressing     Problem: Respiratory Compromise (Stroke, Ischemic/Transient Ischemic Attack)  Goal: Effective Oxygenation and Ventilation  Outcome: Ongoing, Progressing     Problem: Sensorimotor Impairment (Stroke, Ischemic/Transient Ischemic Attack)  Goal: Improved Sensorimotor Function  Outcome: Ongoing, Progressing     Problem: Swallowing Impairment (Stroke, Ischemic/Transient Ischemic Attack)  Goal: Optimal Eating and Swallowing without Aspiration  Outcome: Ongoing, Progressing     Problem: Urinary Elimination Impaired (Stroke, Ischemic/Transient Ischemic Attack)  Goal: Effective Urinary  Elimination  Outcome: Ongoing, Progressing

## 2022-10-26 NOTE — PROGRESS NOTES
[facility]  Hospital Medicine Progress Note        Chief Complaint: Inpatient Follow-up    Subjective  A 66-year-old female with significant history of recurrent syncope/fall/ataxic gait.  Diagnosed with the cerebellar infarcts likely embolic on aspirin, Eliquis.  Patient awaiting LINQ placement.  She was admitted to our service and was discharged on 10/11 after being diagnosed with CVA.  Patient admitted hospitalist medicine service on 10/17 again with recurrent syncopal/collapse episodes.  During previous hospitalization patient was significantly anemic, she underwent GI evaluation with EGD showing some ulceration at gastrectomy anastomotic site with no active bleeding.  During previous hospitalization therapy services had recommended skilled nursing facility placement, but the patient refused and went home.  Given persistent/recurrent syncope/collapse decided to repeat MRI.  MRI showed evolving CVA-bilateral cerebral/cerebellar.  Neurology consulted given ongoing symptoms.  CT chest, abdomen, pelvis also ordered given significant weight loss and smoking history.  No occult malignancy.  But consistent with pneumonia.  Initiated appropriate antibiotics.  Home anti thrombotics including Eliquis resumed  MRI brain was repeated 10/18-no new stroke, Confirms evolving bilateral cerebral/cerebellar CVAs  Continue antibiotics for right-sided pneumonia   Patient reported unintentional weight loss, CT chest, abdomen/pelvis with C, no malignancy  We will discontinue steroids continue bronchodilators, she looks very comfortable and there is no wheezing.  Supplemental oxygen to keep saturations more than 90%   Although D-dimer was elevated V/Q scan and bilateral lower extremity Doppler ultrasound is negative.  It was most likely due to inflammatory process.  Continue PPI, Carafate for history of gastric anastomotic ulcer   Patient will need Protonix daily since she is on Eliquis now.    Interval  Hx:   She was placed on oxygen today also, she states she was feeling mildly short of breath.  Afebrile vitals stable   White blood cell count elevation fingers most likely due to steroid use.    Objective/physical exam:  General: In no acute distress, afebrile  Chest:  Few scattered wheezes  Heart: S1, S2, no appreciable murmur  Abdomen: Soft, nontender, BS +  MSK: Warm, no lower extremity edema, no clubbing or cyanosis  Neurologic: Alert and oriented x4,     VITAL SIGNS: 24 HRS MIN & MAX LAST   Temp  Min: 98.2 °F (36.8 °C)  Max: 99.4 °F (37.4 °C) 98.2 °F (36.8 °C)   BP  Min: 90/55  Max: 114/65 (!) 102/58   Pulse  Min: 86  Max: 105  105   Resp  Min: 16  Max: 20 16   SpO2  Min: 91 %  Max: 96 % (!) 94 %       Recent Labs   Lab 10/25/22  0408   WBC 15.3*   RBC 3.26*   HGB 8.8*   HCT 29.1*   MCV 89.3   MCH 27.0   MCHC 30.2*   RDW 19.9*      MPV 10.8*           Recent Labs   Lab 10/23/22  0535 10/25/22  0408    137   K 4.3 4.5   CO2 27 27   BUN 21.5* 20.9*   CREATININE 0.79 0.76   CALCIUM 8.3* 8.3*   MG 1.40*  --    ALBUMIN 1.9* 2.0*   ALKPHOS 93 93   ALT 24 15   AST 44* 16   BILITOT 0.2 0.3            Microbiology Results (last 7 days)       ** No results found for the last 168 hours. **             Scheduled Med:   albuterol-ipratropium  3 mL Nebulization Q6H    amLODIPine  5 mg Oral Daily    apixaban  5 mg Oral BID    ARIPiprazole  10 mg Oral Daily    atorvastatin  40 mg Oral Daily    cyproheptadine  4 mg Oral BID    ferrous sulfate  1 tablet Oral Daily    metoprolol succinate  50 mg Oral Daily    miconazole NITRATE 2 %   Topical (Top) BID    multivitamin  1 tablet Oral Daily    nicotine  1 patch Transdermal Daily    pantoprazole  40 mg Oral BID    sodium chloride 0.9%  10 mL Intravenous Q6H    sucralfate  1 g Oral QID    zinc oxide-cod liver oil   Topical (Top) BID          Assessment/Plan:  Recurrent syncope/collapse  Subacute CVA-bilateral cerebral/cerebellar  Abnormal gait  Right-sided pneumonia-rule  out aspiration  Pulmonary cachexia  COPD with acute exacerbation   Acute on chronic hypoxemic/hypercapnic respiratory failure  Essential HTN-accelerated   HLD   Chronic anemia  Gastric ulcer  Chronic tobacco use  Prophylaxis      - since she has mild shortness of breath requiring oxygen and cough with lingering white blood cell count will keep her 1 more day.  The weakness is expected for this patient however she is more fatigued than yesterday.  Speech evaluated and changed her diet.  - her goal of oxygen he is 88% and above is okay she is not in respiratory distress respiratory rate is around 20 he is she will not need oxygen unless saturation drops below 88%.    - Discussed with neurology they are recommending Eliquis 5 mg b.I.d. at this time due to distribution of the stroke suggesting embolic and suspicion of Afib cardiac reasons are very high, patient will also get leg placement for definitive diagnoses.  - Discussed with the patient about fall risk and placement.Continue other home meds-aripiprazole, statin, iron, Toprol-XL, multivitamin   Nicotine patch   BP accelerated occasionally and therefore  add amlodipine  DVT prophylaxis-on Eliquis    Case management consult for rehab placement    Julián Erwin MD   10/26/2022

## 2022-10-26 NOTE — PT/OT/SLP PROGRESS
Physical Therapy         Treatment        Nimo Dill   MRN: [id number]     PT Received On: 10/26/22  PT Start Time: 1330     PT Stop Time: 1400    PT Total Time (min): 30 min       Billable Minutes:  Gait [id number]  Therapeutic Activity: 15  Total Minutes: 30    Treatment Type: Treatment  PT/PTA: PT     PTA Visit Number: 3       General Precautions: Standard, fall  Orthopedic Precautions: Orthopedic Precautions : N/A   Braces:           Subjective:  Communicated with NSG prior to session.         Objective:  Patient found supine, with      Functional Mobility:  Bed Mobility:   Supine to sit: Minimal Assistance   Scooting: Contact Guard Assistance    Balance:   Static Sit: FAIR: Maintains without assist, but unable to take any challenges   Dynamic Sit:  FAIR: Cannot move trunk without losing balance    Transfer Training:  Sit to stand:Minimal Assistance with Rolling Walker        Gait Training:  Pt ambulates x 20 feet with RW and min A. Pt demos decreased motor control of LLE with ambultion. Pt requires verbal cues for each step in order to perform step through pattern.    Activity Tolerance:  Patient tolerated treatment well    Patient left up in chair with all lines intact and call button in reach.    Assessment:  Niom Dill is a 66 y.o. female with a medical diagnosis of cerebellar CVA. She presents with impaired balance, decreased motor control, gait instability, and impaired functional mobility. Pt would greatly benefit from inpatient rehab upon discharge.    Rehab potential is excellent.    Activity tolerance: Excellent    Discharge recommendations: Discharge Facility/Level of Care Needs: rehabilitation facility     Equipment recommendations: Equipment Needed After Discharge: walker, rolling     GOALS:   Multidisciplinary Problems       Physical Therapy Goals       Not on file              Multidisciplinary Problems (Resolved)          Problem: Physical Therapy    Goal Priority Disciplines Outcome Goal  Variances Interventions   Physical Therapy Goal   (Resolved)     PT, PT/OT Met     Description: Goals to be met by: 22     Patient will increase functional independence with mobility by performin. Supine to sit with Modified [place]  2. Sit to stand transfer with Modified [place]  3. Gait  x 200 feet with Modified [place] using Rolling Walker.                          PLAN:    Patient to be seen daily  to address the above listed problems via gait training, therapeutic activities, therapeutic exercises  Plan of Care expires: 22  Plan of Care reviewed with: patient         10/26/2022

## 2022-10-26 NOTE — PT/OT/SLP PROGRESS
Occupational Therapy   Treatment    Name: Nimo Dill  MRN: [id number]  Admitting Diagnosis:  <principal problem not specified>  5 Days Post-Op    Recommendations:     Discharge Recommendations: rehabilitation facility  Discharge Equipment Recommendations:  walker, rolling, other (see comments) (shower chair vs. ETTB)  Barriers to discharge:  None    Assessment:     Nimo Dill is a 66 y.o. female with a medical diagnosis of syncope/fall/ataxic gait, B cerebral/cerebellar subacute CVA  She presents with  and improved activity tolerance and weakness. Pt would benefit from rehab facility placement upon DC to improve strength and independence with ADLs. Performance deficits affecting function are weakness, impaired endurance, impaired functional mobility, impaired self care skills, gait instability, decreased coordination, decreased safety awareness, pain.     Rehab Prognosis:  Good; patient would benefit from acute skilled OT services to address these deficits and reach maximum level of function.       Plan:     Patient to be seen 5 x/week, daily to address the above listed problems via self-care/home management, therapeutic exercises, therapeutic activities  Plan of Care Expires: 11/01/22  Plan of Care Reviewed with: patient, spouse    Subjective     Pain/Comfort:  Pain Rating 1: 7/10  Location - Side 1: Right  Location 1: neck    Objective:     Communicated with: nsg and PT prior to session.  Patient found HOB elevated with telemetry, pulse ox (continuous), PICC line upon OT entry to room.    General Precautions: Standard, fall   Orthopedic Precautions:N/A   Braces: N/A  Respiratory Status: Nasal cannula, flow 2 L/min     Occupational Performance:     Bed Mobility:    Patient completed Supine to Sit with stand by assistance  Patient completed Sit to Supine with stand by assistance     Functional Mobility/Transfers:  Patient completed Sit <> Stand Transfer with stand by assistance and minimum assistance  with   rolling walker - @ EOB, x 2 reps  Functional Mobility: Pt able to ambulate in room from standing @ EOB <> door and back to bed (~ 10 ft x 2) with CGA with RW - req max cues for stepping/gait with L LE, min-mod cues for RW manipulation    Activities of Daily Living:  Grooming: maximal assistance - Pt brushed hair seated @EOB for ~2 min, Therapist had to finish brushing and pin up in clip    Treatment & Education:  Pt and  educated on POC and safety.     Patient left sitting edge of bed with all lines intact, call button in reach, and spouse present    GOALS:   Multidisciplinary Problems       Occupational Therapy Goals       Not on file              Multidisciplinary Problems (Resolved)          Problem: Occupational Therapy    Goal Priority Disciplines Outcome Interventions   Occupational Therapy Goal   (Resolved)     OT, PT/OT Met    Description: Goals to be met by: 11/01/22     Patient will increase functional independence with ADLs by performing:    UE Dressing with [place].  LE Dressing with Stand-by Assistance with RW.  Grooming while standing at sink with Stand-by Assistance with RW.  Toileting from bedside commode with Stand-by Assistance for hygiene and clothing management with RW.   Bathing from  shower chair/bench with Supervision.  Supine to sit with [place].  Toilet transfer to bedside commode with Stand-by Assistance with RW.                         Time Tracking:     OT Date of Treatment: 10/26/22  OT Start Time: 1336  OT Stop Time: 1356  OT Total Time (min): 20 min    Billable Minutes:Self Care/Home Management - 20 min    OT/SHERLYN: OT     SHERLYN Visit Number: 4    10/26/2022

## 2022-10-26 NOTE — CONSULTS
Inpatient Nutrition Evaluation    Admit Date: 10/17/2022   Total duration of encounter: 9 days    Nutrition Recommendation/Prescription     Continue diet as per SLP/pt tolerates  Continue Robert BID to assist with wounds. 14 g amino acids, 80 kcals per serving  Chocolate Boost Plus daily. 360 kcals and 14 g protein.     Nutrition Assessment     Chart Review    Reason Seen: physician consult    Diagnosis:  Recurrent syncope/collapse  Subacute CVA-bilateral cerebral/cerebellar  Abnormal gait  Right-sided pneumonia-rule out aspiration  Pulmonary cachexia  COPD with acute exacerbation   Acute on chronic hypoxemic/hypercapnic respiratory failure  Essential HTN-accelerated   HLD   Chronic anemia  Gastric ulcer  Chronic tobacco use    Relevant Medical History: recurrent falls, CVAs, PUD    Nutrition-Related Medications: atorvastatin, ferrous sulfate, metoprolol, MVI, carafate     Nutrition-Related Labs:  10/19: WBC-12.7, H/H-8.8/29.6, Na-135, gluc-118, Mg-1.5  10/25: WBC 15.3, RBC 3.26, BUN 20.9, Ca 8.3, Alb 2.0     Diet Order: Diet Adult Regular Cardiac  Oral Supplement Order: Robert  Appetite/Oral Intake: good/% of meals  Factors Affecting Nutritional Intake: NPO  Food/Church/Cultural Preferences: none reported  Food Allergies: none reported    Skin Integrity: skin tear, incision, bruised (ecchymotic)  Wound(s):      Altered Skin Integrity 10/17/22 2300 Left anterior;distal;upper Arm #1 Skin Tear Partial thickness tissue loss. Shallow open ulcer with a red or pink wound bed, without slough. Intact or Open/Ruptured Serum-filled blister.-Tissue loss description: Partial thickness       Altered Skin Integrity 10/06/22 2249 medial Perineum #1 Incontinence associated dermatitis Partial thickness tissue loss. Shallow open ulcer with a red or pink wound bed, without slough. Intact or Open/Ruptured Serum-filled blister.-Tissue loss description: Not applicable noted    Comments    10/19: pt has been NPO. SLP evaluated  "today for swallow function. Was able to advance diet today. Pt noted 5# wt gain in last weeks. Will add supplement to assist with wounds.     10/26: Pt reports good appetite, eating 100% or close to it consistently, tolerating diet well, does not drink Robert - we discussed what it was for and she agrees to start drinking it, she also agrees to chocolate Boost.    Anthropometrics    Height: 5' 4" (162.6 cm)    Last Weight: 49.6 kg (109 lb 5.6 oz) (10/26/22 0602) Weight Method: Bed Scale  BMI (Calculated): 18.8  BMI Classification: underweight (BMI less than 18.5)     Ideal Body Weight (IBW), Female: 120 lb     % Ideal Body Weight, Female (lb): 83.33 %                             Usual Weight Provided By: EMR weight history    Wt Readings from Last 5 Encounters:   10/26/22 49.6 kg (109 lb 5.6 oz)   10/06/22 44.6 kg (98 lb 5.2 oz)   06/15/22 46.4 kg (102 lb 3.2 oz)   11/12/21 46.5 kg (102 lb 8.2 oz)   09/30/13 53.6 kg (118 lb 3.2 oz)     Weight Change(s) Since Admission:  Admit Weight: 45.4 kg (100 lb) (10/17/22 1257)  10/19: 48.3kg; 5# wt gain x2 weeks  10/26: 49.6 kg     Patient Education    Not applicable.    Monitoring & Evaluation     Dietitian will monitor food and beverage intake.  Nutrition Risk/Follow-Up: low (follow-up in 5-7 days)  Patients assigned 'low nutrition risk' status do not qualify for a full nutritional assessment but will be monitored and re-evaluated in a 5-7 day time period. Please consult if re-evaluation needed sooner.   "

## 2022-10-27 LAB
ALBUMIN SERPL-MCNC: 1.8 GM/DL (ref 3.4–4.8)
ALBUMIN/GLOB SERPL: 0.7 RATIO (ref 1.1–2)
ALP SERPL-CCNC: 121 UNIT/L (ref 40–150)
ALT SERPL-CCNC: 16 UNIT/L (ref 0–55)
AST SERPL-CCNC: 23 UNIT/L (ref 5–34)
BASOPHILS # BLD AUTO: 0.11 X10(3)/MCL (ref 0–0.2)
BASOPHILS NFR BLD AUTO: 0.7 %
BILIRUBIN DIRECT+TOT PNL SERPL-MCNC: 0.4 MG/DL
BUN SERPL-MCNC: 17 MG/DL (ref 9.8–20.1)
CALCIUM SERPL-MCNC: 8.1 MG/DL (ref 8.4–10.2)
CHLORIDE SERPL-SCNC: 102 MMOL/L (ref 98–107)
CO2 SERPL-SCNC: 22 MMOL/L (ref 23–31)
CREAT SERPL-MCNC: 0.68 MG/DL (ref 0.55–1.02)
EOSINOPHIL # BLD AUTO: 0.21 X10(3)/MCL (ref 0–0.9)
EOSINOPHIL NFR BLD AUTO: 1.4 %
ERYTHROCYTE [DISTWIDTH] IN BLOOD BY AUTOMATED COUNT: 19.2 % (ref 11.5–17)
GFR SERPLBLD CREATININE-BSD FMLA CKD-EPI: >60 MLS/MIN/1.73/M2
GLOBULIN SER-MCNC: 2.7 GM/DL (ref 2.4–3.5)
GLUCOSE SERPL-MCNC: 60 MG/DL (ref 82–115)
HCT VFR BLD AUTO: 28.4 % (ref 37–47)
HGB BLD-MCNC: 8.4 GM/DL (ref 12–16)
IMM GRANULOCYTES # BLD AUTO: 0.27 X10(3)/MCL (ref 0–0.04)
IMM GRANULOCYTES NFR BLD AUTO: 1.8 %
LYMPHOCYTES # BLD AUTO: 1.51 X10(3)/MCL (ref 0.6–4.6)
LYMPHOCYTES NFR BLD AUTO: 9.9 %
MCH RBC QN AUTO: 27 PG (ref 27–31)
MCHC RBC AUTO-ENTMCNC: 29.6 MG/DL (ref 33–36)
MCV RBC AUTO: 91.3 FL (ref 80–94)
MONOCYTES # BLD AUTO: 1.22 X10(3)/MCL (ref 0.1–1.3)
MONOCYTES NFR BLD AUTO: 8 %
NEUTROPHILS # BLD AUTO: 12 X10(3)/MCL (ref 2.1–9.2)
NEUTROPHILS NFR BLD AUTO: 78.2 %
NRBC BLD AUTO-RTO: 0 %
PLATELET # BLD AUTO: 267 X10(3)/MCL (ref 130–400)
PMV BLD AUTO: 10.7 FL (ref 7.4–10.4)
POTASSIUM SERPL-SCNC: 4.5 MMOL/L (ref 3.5–5.1)
PROT SERPL-MCNC: 4.5 GM/DL (ref 5.8–7.6)
RBC # BLD AUTO: 3.11 X10(6)/MCL (ref 4.2–5.4)
SODIUM SERPL-SCNC: 135 MMOL/L (ref 136–145)
WBC # SPEC AUTO: 15.3 X10(3)/MCL (ref 4.5–11.5)

## 2022-10-27 PROCEDURE — 94761 N-INVAS EAR/PLS OXIMETRY MLT: CPT

## 2022-10-27 PROCEDURE — 80053 COMPREHEN METABOLIC PANEL: CPT | Performed by: STUDENT IN AN ORGANIZED HEALTH CARE EDUCATION/TRAINING PROGRAM

## 2022-10-27 PROCEDURE — 99900031 HC PATIENT EDUCATION (STAT)

## 2022-10-27 PROCEDURE — A4216 STERILE WATER/SALINE, 10 ML: HCPCS | Performed by: INTERNAL MEDICINE

## 2022-10-27 PROCEDURE — 36415 COLL VENOUS BLD VENIPUNCTURE: CPT | Performed by: STUDENT IN AN ORGANIZED HEALTH CARE EDUCATION/TRAINING PROGRAM

## 2022-10-27 PROCEDURE — 25000003 PHARM REV CODE 250: Performed by: INTERNAL MEDICINE

## 2022-10-27 PROCEDURE — 85025 COMPLETE CBC W/AUTO DIFF WBC: CPT | Performed by: STUDENT IN AN ORGANIZED HEALTH CARE EDUCATION/TRAINING PROGRAM

## 2022-10-27 PROCEDURE — 97530 THERAPEUTIC ACTIVITIES: CPT

## 2022-10-27 PROCEDURE — 27000221 HC OXYGEN, UP TO 24 HOURS

## 2022-10-27 PROCEDURE — 94640 AIRWAY INHALATION TREATMENT: CPT

## 2022-10-27 PROCEDURE — 25000242 PHARM REV CODE 250 ALT 637 W/ HCPCS: Performed by: INTERNAL MEDICINE

## 2022-10-27 PROCEDURE — 25000003 PHARM REV CODE 250: Performed by: NURSE PRACTITIONER

## 2022-10-27 PROCEDURE — 97530 THERAPEUTIC ACTIVITIES: CPT | Mod: CQ

## 2022-10-27 PROCEDURE — 21400001 HC TELEMETRY ROOM

## 2022-10-27 PROCEDURE — 97535 SELF CARE MNGMENT TRAINING: CPT

## 2022-10-27 PROCEDURE — S4991 NICOTINE PATCH NONLEGEND: HCPCS | Performed by: INTERNAL MEDICINE

## 2022-10-27 RX ORDER — TRAMADOL HYDROCHLORIDE 50 MG/1
50 TABLET ORAL EVERY 8 HOURS PRN
Status: DISCONTINUED | OUTPATIENT
Start: 2022-10-27 | End: 2022-10-28 | Stop reason: HOSPADM

## 2022-10-27 RX ADMIN — PANTOPRAZOLE SODIUM 40 MG: 40 TABLET, DELAYED RELEASE ORAL at 09:10

## 2022-10-27 RX ADMIN — SUCRALFATE 1 G: 1 TABLET ORAL at 09:10

## 2022-10-27 RX ADMIN — SUCRALFATE 1 G: 1 TABLET ORAL at 06:10

## 2022-10-27 RX ADMIN — ARIPIPRAZOLE 10 MG: 5 TABLET ORAL at 09:10

## 2022-10-27 RX ADMIN — NICOTINE 1 PATCH: 14 PATCH TRANSDERMAL at 09:10

## 2022-10-27 RX ADMIN — SODIUM CHLORIDE, PRESERVATIVE FREE 10 ML: 5 INJECTION INTRAVENOUS at 06:10

## 2022-10-27 RX ADMIN — SODIUM CHLORIDE 500 ML: 9 INJECTION, SOLUTION INTRAVENOUS at 02:10

## 2022-10-27 RX ADMIN — CYPROHEPTADINE HYDROCHLORIDE 4 MG: 4 TABLET ORAL at 09:10

## 2022-10-27 RX ADMIN — CYPROHEPTADINE HYDROCHLORIDE 4 MG: 4 TABLET ORAL at 08:10

## 2022-10-27 RX ADMIN — SUCRALFATE 1 G: 1 TABLET ORAL at 01:10

## 2022-10-27 RX ADMIN — Medication: at 09:10

## 2022-10-27 RX ADMIN — SODIUM CHLORIDE, PRESERVATIVE FREE 10 ML: 5 INJECTION INTRAVENOUS at 12:10

## 2022-10-27 RX ADMIN — THERA TABS 1 TABLET: TAB at 09:10

## 2022-10-27 RX ADMIN — TRAMADOL HYDROCHLORIDE 50 MG: 50 TABLET, COATED ORAL at 01:10

## 2022-10-27 RX ADMIN — TRAMADOL HYDROCHLORIDE 50 MG: 50 TABLET, COATED ORAL at 08:10

## 2022-10-27 RX ADMIN — AMLODIPINE BESYLATE 5 MG: 5 TABLET ORAL at 09:10

## 2022-10-27 RX ADMIN — APIXABAN 5 MG: 5 TABLET, FILM COATED ORAL at 09:10

## 2022-10-27 RX ADMIN — IPRATROPIUM BROMIDE AND ALBUTEROL SULFATE 3 ML: 2.5; .5 SOLUTION RESPIRATORY (INHALATION) at 01:10

## 2022-10-27 RX ADMIN — IPRATROPIUM BROMIDE AND ALBUTEROL SULFATE 3 ML: 2.5; .5 SOLUTION RESPIRATORY (INHALATION) at 08:10

## 2022-10-27 RX ADMIN — TRAMADOL HYDROCHLORIDE 50 MG: 50 TABLET, COATED ORAL at 03:10

## 2022-10-27 RX ADMIN — MICONAZOLE NITRATE 2 % TOPICAL POWDER: at 08:10

## 2022-10-27 RX ADMIN — FERROUS SULFATE TAB 325 MG (65 MG ELEMENTAL FE) 1 EACH: 325 (65 FE) TAB at 09:10

## 2022-10-27 RX ADMIN — IPRATROPIUM BROMIDE AND ALBUTEROL SULFATE 3 ML: 2.5; .5 SOLUTION RESPIRATORY (INHALATION) at 07:10

## 2022-10-27 RX ADMIN — Medication: at 08:10

## 2022-10-27 RX ADMIN — PANTOPRAZOLE SODIUM 40 MG: 40 TABLET, DELAYED RELEASE ORAL at 08:10

## 2022-10-27 RX ADMIN — ALUMINUM HYDROXIDE, MAGNESIUM HYDROXIDE, AND SIMETHICONE 30 ML: 200; 200; 20 SUSPENSION ORAL at 06:10

## 2022-10-27 RX ADMIN — APIXABAN 5 MG: 5 TABLET, FILM COATED ORAL at 08:10

## 2022-10-27 RX ADMIN — MICONAZOLE NITRATE 2 % TOPICAL POWDER: at 09:10

## 2022-10-27 RX ADMIN — ATORVASTATIN CALCIUM 40 MG: 40 TABLET, FILM COATED ORAL at 09:10

## 2022-10-27 NOTE — PLAN OF CARE
JATINDER was notified by [facility] that there is no bed availability until Monday. JATINDER sent new referral to [facility] Phone: [phone] via [employer].

## 2022-10-27 NOTE — PROGRESS NOTES
[facility]  Hospital Medicine Progress Note        Chief Complaint: Inpatient Follow-up    Subjective  A 66-year-old female with significant history of recurrent syncope/fall/ataxic gait.  Diagnosed with the cerebellar infarcts likely embolic on aspirin, Eliquis.  Patient awaiting LINQ placement.  She was admitted to our service and was discharged on 10/11 after being diagnosed with CVA.  Patient admitted hospitalist medicine service on 10/17 again with recurrent syncopal/collapse episodes.  During previous hospitalization patient was significantly anemic, she underwent GI evaluation with EGD showing some ulceration at gastrectomy anastomotic site with no active bleeding.  During previous hospitalization therapy services had recommended skilled nursing facility placement, but the patient refused and went home.  Given persistent/recurrent syncope/collapse decided to repeat MRI.  MRI showed evolving CVA-bilateral cerebral/cerebellar.  Neurology consulted given ongoing symptoms.  CT chest, abdomen, pelvis also ordered given significant weight loss and smoking history.  No occult malignancy.  But consistent with pneumonia.  Initiated appropriate antibiotics.  Home anti thrombotics including Eliquis resumed  MRI brain was repeated 10/18-no new stroke, Confirms evolving bilateral cerebral/cerebellar CVAs  Continue antibiotics for right-sided pneumonia   Patient reported unintentional weight loss, CT chest, abdomen/pelvis with C, no malignancy  We will discontinue steroids continue bronchodilators, she looks very comfortable and there is no wheezing.  Supplemental oxygen to keep saturations more than 90%   Although D-dimer was elevated V/Q scan and bilateral lower extremity Doppler ultrasound is negative.  It was most likely due to inflammatory process.  Continue PPI, Carafate for history of gastric anastomotic ulcer   Patient will need Protonix daily since she is on Eliquis now.    Interval  SUZIE ambulatory encounter  FAMILY PRACTICE ANNUAL PHYSICAL EXAM    CHIEF COMPLAINT:  Establish Care (restarting anxiety and depression meds)       HISTORY OF PRESENT ILLNESS:    Rena Egan is a pleasant 25 year old female who presents today for establishing care, annual physical     New concerns discussed include:   Depression and Anxiety - She was on Lexapro and xanax about a year ago. She did not like how Xanax made her feel but she is interested in discussing treatment - She tells me she would like to maybe just be on Lexapro.  She feels her low days are starting to become more frequent in the last couple of months and the intensity is also higher than before.   No suicidal or Homicidal thoughts.   Sleep is \"just OK\" frequent wakening with difficulty falling sleep again/   She notes some difficulty concentrating at work in the afternoongs.    Sexually active - 1 male partner x 5 years. No history of STI on her him.       PROBLEM LIST:    Patient Active Problem List   Diagnosis   • Depression with anxiety   • Obesity   • Annual physical exam - Dr. BISWAS - 9/30/2019       HISTORIES:    I have reviewed the past medical history, family history, social history, medications and allergies listed in the medical record as obtained by my nursing staff and support staff and agree with their documentation.  Allergies, Medications, Medical history, Surgical history, Social history and Family history were reviewed and updated  .  ALLERGIES:   Allergen Reactions   • Strawberry   (Food Or Med) ANAPHYLAXIS     Any type of berry that has seeds in them her throat swells up     Current Outpatient Medications   Medication Sig Dispense Refill   • escitalopram (LEXAPRO) 10 MG tablet Take 1 tablet by mouth daily. 30 tablet 0   • escitalopram (LEXAPRO) 10 MG tablet Take 1 tablet by mouth daily. 30 tablet 1     No current facility-administered medications for this visit.      History reviewed. No pertinent surgical history.  Social  History     Tobacco Use   • Smoking status: Never Smoker   • Smokeless tobacco: Never Used   Substance Use Topics   • Alcohol use: Yes     Comment: occ mixed drinks   • Drug use: No     Family History   Problem Relation Age of Onset   • Myocardial Infarction Father 50     Health Maintenance   Topic Date Due   • Influenza Vaccine (1) 09/01/2019   • Cervical Cancer Screening  12/02/2019   • DTaP/Tdap/Td Vaccine (7 - Td) 02/24/2020   • Varicella Vaccine  Completed   • HPV (Female) Vaccine  Completed   • Meningococcal Vaccine  Aged Out   • Pneumococcal Vaccine 0-64  Aged Out       REVIEW OF SYSTEMS:    Constitutional: Negative for fever and chills.   Skin: Negative for rash.   HEENT: Negative for eye drainage, rhinorrhea, ear pain or sore throat.  Respiratory: Negative for cough, wheezing or shortness of breath.    Cardiovascular: Negative for chest pain, chest pressure, palpitations or diaphoresis.   Gastrointestinal: Negative for nausea, vomiting, diarrhea or abdominal pain.   Genitourinary: Negative for dysuria, urgency, frequency, hematuria or flank pain.  Extremities: Negative for joint swelling or joint pain.  Neurologic: Negative for change in sensory or motor function.  Negative for headache.  Endocrine: Negative for heat or cold intolerance, weight loss or gain.  Hematological: Negative for bleeding, bruising or adenopathy.  Psychiatric: HPI     PHYSICAL EXAM:    Vital Signs:    Visit Vitals  /82 (BP Location: RUE - Right upper extremity, Patient Position: Sitting, Cuff Size: Large Adult)   Pulse 94   Temp 98 °F (36.7 °C) (Oral)   Resp 20   Ht 5' 7\" (1.702 m)   Wt 105.1 kg   LMP 09/10/2019   SpO2 95%   BMI 36.29 kg/m²     Pulse Ox Interpretation:  Within normal limits.  General:   Alert, cooperative, conversive in no acute distress.  Skin:  Warm and dry without rash.    Head:  Normocephalic, atraumatic.   Neck:  Trachea is midline.     Eyes:  Normal conjunctivae and sclerae.   ENT:  Mucous membranes are  Hx:   Sitting in chair, getting ready for her physical and occupational therapy.  No family is at bedside  Discussed plan for discharge tomorrow to [facility]  Case discussed with patient nurse and  on the floor      Objective/physical exam:  General: In no acute distress, afebrile  Chest:  Few scattered wheezes  Heart: S1, S2, no appreciable murmur, dressing over the loop recorder site  Abdomen: Soft, nontender, BS +  MSK: Warm, no lower extremity edema, no clubbing or cyanosis  Neurologic: Alert and oriented x4,     VITAL SIGNS: 24 HRS MIN & MAX LAST   Temp  Min: 97.6 °F (36.4 °C)  Max: 98.9 °F (37.2 °C) 97.6 °F (36.4 °C)   BP  Min: 91/52  Max: 102/58 (!) 96/58   Pulse  Min: 76  Max: 105  86   Resp  Min: 16  Max: 20 16   SpO2  Min: 94 %  Max: 98 % 96 %       Recent Labs   Lab 10/27/22  0642   WBC 15.3*   RBC 3.11*   HGB 8.4*   HCT 28.4*   MCV 91.3   MCH 27.0   MCHC 29.6*   RDW 19.2*      MPV 10.7*         Recent Labs   Lab 10/23/22  0535 10/25/22  0408 10/27/22  0642      < > 135*   K 4.3   < > 4.5   CO2 27   < > 22*   BUN 21.5*   < > 17.0   CREATININE 0.79   < > 0.68   CALCIUM 8.3*   < > 8.1*   MG 1.40*  --   --    ALBUMIN 1.9*   < > 1.8*   ALKPHOS 93   < > 121   ALT 24   < > 16   AST 44*   < > 23   BILITOT 0.2   < > 0.4    < > = values in this interval not displayed.          Microbiology Results (last 7 days)       ** No results found for the last 168 hours. **             Scheduled Med:   albuterol-ipratropium  3 mL Nebulization Q6H    amLODIPine  5 mg Oral Daily    apixaban  5 mg Oral BID    ARIPiprazole  10 mg Oral Daily    atorvastatin  40 mg Oral Daily    cyproheptadine  4 mg Oral BID    ferrous sulfate  1 tablet Oral Daily    metoprolol succinate  50 mg Oral Daily    miconazole NITRATE 2 %   Topical (Top) BID    multivitamin  1 tablet Oral Daily    nicotine  1 patch Transdermal Daily    pantoprazole  40 mg Oral BID    sodium chloride 0.9%  10 mL Intravenous Q6H    sucralfate  1 g Oral  QID    zinc oxide-cod liver oil   Topical (Top) BID          Assessment/Plan:  Recurrent syncope/collapse  Subacute CVA-bilateral cerebral/cerebellar  Abnormal gait  Right-sided pneumonia-rule out aspiration  Pulmonary cachexia  COPD with acute exacerbation   Acute on chronic hypoxemic/hypercapnic respiratory failure  Essential HTN-now with hypotension  HLD   Chronic anemia  Gastric ulcer  Chronic tobacco use  Hypotension after administration of amlodipine      Status post loop recorder implant on 10/21  Patient was accepted to  ortho rehab yesterday per discharge orders had to monitor overnight.  Now she has lost her room.  Referral has been sent to [facility] and patient has been accepted for admission to the facility tomorrow  Speech evaluated and changed her diet.  Goal SPO2 88%   Developed hypotension and dizziness after Amlodipine administration this morning despite her low blood pressure.  Noted patient was on tramadol q.6, Abilify, trazodone at nighttime.  Discontinued amlodipine, change tramadol to Q 8 and discontinue trazodone also  Ordered normal saline 500 cc bolus x2 in intervals  Continue metoprolol for heart rate control  Discussed with neurology they are recommending Eliquis 5 mg b.I.d. at this time due to distribution of the stroke suggesting embolic and suspicion of Afib- cardiac reasons are very high  Cardiology will follow her as outpatient  Discussed with the patient about fall risk  Continue other home meds-aripiprazole, statin, iron, Toprol-XL, multivitamin   Nicotine patch   Leukocytosis persist but no left shift, no fever  Labs stable       VTE prophylaxis: on Eliquis    Patient condition:  Fair    Anticipated discharge and Disposition:   [facility] tomorrow       Critical care note:  Critical care diagnosis:  Medication induced hypotension requiring normal saline boluses  Critical care interventions: Hands-on evaluation, review of labs/radiographs/records and discussion with patient and family if  moist.   Cardiovascular:  Symmetrical pulses.  Regular rate and rhythm without murmur.  Respiratory:   Normal respiratory effort.  Clear to auscultation.  No wheezes, rales or rhonchi.  Gastrointestinal:  Soft and nontender.  Normal bowel sounds.  No hepatomegaly or splenomegaly.   Musculoskeletal:  No deformity or edema.   Back:  Normal alignment.  No costovertebral angle tenderness.  Neurologic:  Oriented x4.  No focal deficits.  Psychiatric:  Cooperative.  Appropriate mood and affect.    LABORATORY DATA:    No recent labs.    IMAGING STUDIES:    N/A    Body mass index is 36.29 kg/m².    BMI ASSESSMENT/PLAN:  Follow-up in 4 weeks.     Over the last 2 weeks, how often have you been bothered by the following problems?          PHQ2 Score:  3  1. Little interest or pleasure in doing things?:  2  2. Feeling down, depressed, or hopeless?:  1         DEPRESSION ASSESSMENT/PLAN:  Depression Screening performed. Screening time with patient was 15 minutes.     ASSESSMENT:    1. Annual physical exam - Dr. BISWAS - 9/30/2019    2. Depression with anxiety        PLAN:    Orders Placed This Encounter   • SERVICE TO BEHAVIORAL HEALTH   • escitalopram (LEXAPRO) 10 MG tablet       Return for 4 weeks - Depression /Anxiety treatment follow up..    Screenings/Treatments Needed:  Pap and STI screening deferred to December 2019 - Last pap December 2016    Instructions provided as documented in the after visit summary.    The patient indicated understanding of the diagnosis and agreed with the plan of care.            present  Critical care time spent: 35 minutes        All diagnosis and differential diagnosis have been reviewed; assessment and plan has been documented; I have personally reviewed the labs and test results that are presently available; I have reviewed the patients medication list; I have reviewed the consulting providers response and recommendations. I have reviewed or attempted to review medical records based upon their availability    All of the patient's questions have been  addressed and answered. Patient's is agreeable to the above stated plan. I will continue to monitor closely and make adjustments to medical management as needed.        Adam Batista MD  Department of Hospital Medicine   [facility]   10/27/2022 8:34 AM

## 2022-10-27 NOTE — PT/OT/SLP PROGRESS
Occupational Therapy   Treatment    Name: Nimo Dill  MRN: [id number]  Admitting Diagnosis:  <principal problem not specified>  6 Days Post-Op    Recommendations:     Discharge Recommendations: rehabilitation facility  Discharge Equipment Recommendations:  walker, rolling (shower chair vs. ETTB)  Barriers to discharge:  None    Assessment:     Nimo Dill is a 66 y.o. female with a medical diagnosis of syncope/fall/ataxic gait, B cerebral/cerebellar subacute CVA.  She presents with improved activity tolerance and gait. Pt is motivated and puts forth good effort. Performance deficits affecting function are weakness, impaired endurance, impaired self care skills, impaired functional mobility, gait instability, decreased coordination, decreased safety awareness.     Rehab Prognosis:  Good; patient would benefit from acute skilled OT services to address these deficits and reach maximum level of function.       Plan:     Patient to be seen 5 x/week, daily to address the above listed problems via self-care/home management, therapeutic exercises, therapeutic activities  Plan of Care Expires: 11/01/22  Plan of Care Reviewed with: patient    Subjective     Pain/Comfort:  Pain Rating 1: 0/10    Objective:     Communicated with: nsmarlin prior to session.  Patient found up in chair with telemetry, pulse ox (continuous) upon OT entry to room.    General Precautions: Standard, fall   Orthopedic Precautions:N/A   Braces: N/A  Respiratory Status: Nasal cannula, flow 1 L/min     Occupational Performance:     Functional Mobility/Transfers:  Patient completed Sit <> Stand Transfer with contact guard assistance and minimum assistance  with  rolling walker  - @ reps from recliner, 2 reps from wc, Min verbal cues  Functional Mobility: Pt ambulated in room from ~ 10 ft from chair <> doorway with Min A/CGA with RW and O2 tank (1 L/min, held by therapist) - improved stepping with Min cues for step through gait pattern and RW  manipulation.   Pt propelled wc in hallway ~ 20-30 ft with SBA and O2 tank     Activities of Daily Living:  Toileting: maximal assistance and total assistance - hygiene. Pt had BM in brief toward end of session, brief changed and pt cleaned up by therapist with pt in standing @ chair with CGA and RW - x 2      Treatment & Education:  Pt eeducated on POC and saftey    Patient left up in chair with all lines intact, call button in reach, and nsg present    GOALS:   Multidisciplinary Problems       Occupational Therapy Goals       Not on file              Multidisciplinary Problems (Resolved)          Problem: Occupational Therapy    Goal Priority Disciplines Outcome Interventions   Occupational Therapy Goal   (Resolved)     OT, PT/OT Met    Description: Goals to be met by: 11/01/22     Patient will increase functional independence with ADLs by performing:    UE Dressing with [place].  LE Dressing with Stand-by Assistance with RW.  Grooming while standing at sink with Stand-by Assistance with RW.  Toileting from bedside commode with Stand-by Assistance for hygiene and clothing management with RW.   Bathing from  shower chair/bench with Supervision.  Supine to sit with [place].  Toilet transfer to bedside commode with Stand-by Assistance with RW.                         Time Tracking:     OT Date of Treatment: 10/27/22  OT Start Time: 1126  OT Stop Time: 1206  OT Total Time (min): 40 min    Billable Minutes:Self care 15 min  Therapeutic Activity 25 min    OT/SHERLYN: OT     SHERLYN Visit Number: 5    10/27/2022  Edited by ALESSIA Street

## 2022-10-27 NOTE — PT/OT/SLP PROGRESS
Physical Therapy Treatment    Patient Name:  Nimo Dill   MRN:  [id number]    Recommendations:     Discharge Recommendations:  rehabilitation facility, home with home health, nursing facility, skilled   Discharge Equipment Recommendations: walker, rolling     Subjective     Patient awake and alert.     Objective:     General Precautions: Standard, fall   Orthopedic Precautions:N/A   Braces:    Respiratory Status: Nasal cannula, flow . L/min     Communicated with nurse prior to treatment.   .  Functional Mobility:    Rolling:Stand-by Assistance  Supine to sit:Minimal Assistance  Sit to stand transfer: Moderate Assistance  Bed to chair transfer:Moderate Assistance  Pt declined gait stating she already did PT and OT today. Pt assisted BTB and required mod assist. Cues for hand placement and sequence to ensure safety and ease of transfers.       AM-PAC 6 CLICK MOBILITY        Patient left supine with call button in reach..    GOALS:   Multidisciplinary Problems       Physical Therapy Goals       Not on file              Multidisciplinary Problems (Resolved)          Problem: Physical Therapy    Goal Priority Disciplines Outcome Goal Variances Interventions   Physical Therapy Goal   (Resolved)     PT, PT/OT Met     Description: Goals to be met by: 22     Patient will increase functional independence with mobility by performin. Supine to sit with Modified Sims  2. Sit to stand transfer with Modified Sims  3. Gait  x 200 feet with Modified Sims using Rolling Walker.                          Assessment:     Nimo Dill is a 66 y.o. female admitted with a medical diagnosis of <principal problem not specified>.     Rehab Prognosis: Good; patient would benefit from acute skilled PT services to address these deficits and reach maximum level of function.    Recent Surgery: Procedure(s) (LRB):  Insertion, Implantable Loop Recorder (N/A) 6 Days Post-Op    Plan:     During this  hospitalization, patient to be seen daily to address the identified rehab impairments via gait training, therapeutic activities, therapeutic exercises and progress toward the following goals:    Plan of Care Expires:  11/18/22    Billable Minutes     Billable Minutes: Therapeutic Activity 10    Treatment Type: Treatment  PT/PTA: PTA     PTA Visit Number: 4     10/27/2022

## 2022-10-28 VITALS
TEMPERATURE: 99 F | OXYGEN SATURATION: 97 % | HEIGHT: 64 IN | DIASTOLIC BLOOD PRESSURE: 54 MMHG | SYSTOLIC BLOOD PRESSURE: 107 MMHG | WEIGHT: 109.38 LBS | HEART RATE: 102 BPM | RESPIRATION RATE: 18 BRPM | BODY MASS INDEX: 18.67 KG/M2

## 2022-10-28 PROBLEM — R55 SYNCOPE: Status: RESOLVED | Noted: 2022-10-18 | Resolved: 2022-10-28

## 2022-10-28 PROCEDURE — 25000003 PHARM REV CODE 250: Performed by: INTERNAL MEDICINE

## 2022-10-28 PROCEDURE — 94761 N-INVAS EAR/PLS OXIMETRY MLT: CPT

## 2022-10-28 PROCEDURE — 25000003 PHARM REV CODE 250: Performed by: NURSE PRACTITIONER

## 2022-10-28 PROCEDURE — S4991 NICOTINE PATCH NONLEGEND: HCPCS | Performed by: INTERNAL MEDICINE

## 2022-10-28 PROCEDURE — 25000242 PHARM REV CODE 250 ALT 637 W/ HCPCS: Performed by: INTERNAL MEDICINE

## 2022-10-28 PROCEDURE — A4216 STERILE WATER/SALINE, 10 ML: HCPCS | Performed by: INTERNAL MEDICINE

## 2022-10-28 PROCEDURE — 27000221 HC OXYGEN, UP TO 24 HOURS

## 2022-10-28 PROCEDURE — 94640 AIRWAY INHALATION TREATMENT: CPT

## 2022-10-28 RX ORDER — TRAMADOL HYDROCHLORIDE 50 MG/1
50 TABLET ORAL EVERY 8 HOURS PRN
Start: 2022-10-28

## 2022-10-28 RX ORDER — IPRATROPIUM BROMIDE AND ALBUTEROL SULFATE 2.5; .5 MG/3ML; MG/3ML
3 SOLUTION RESPIRATORY (INHALATION) EVERY 6 HOURS
Qty: 75 ML | Refills: 0
Start: 2022-10-28

## 2022-10-28 RX ORDER — POLYETHYLENE GLYCOL 3350 17 G/17G
17 POWDER, FOR SOLUTION ORAL 2 TIMES DAILY PRN
Refills: 0
Start: 2022-10-28

## 2022-10-28 RX ORDER — IBUPROFEN 200 MG
1 TABLET ORAL DAILY
Refills: 0
Start: 2022-10-28 | End: 2022-12-07

## 2022-10-28 RX ORDER — MICONAZOLE NITRATE 2 %
POWDER (GRAM) TOPICAL 2 TIMES DAILY
Refills: 0
Start: 2022-10-28

## 2022-10-28 RX ADMIN — TRAMADOL HYDROCHLORIDE 50 MG: 50 TABLET, COATED ORAL at 02:10

## 2022-10-28 RX ADMIN — IPRATROPIUM BROMIDE AND ALBUTEROL SULFATE 3 ML: 2.5; .5 SOLUTION RESPIRATORY (INHALATION) at 01:10

## 2022-10-28 RX ADMIN — CYPROHEPTADINE HYDROCHLORIDE 4 MG: 4 TABLET ORAL at 10:10

## 2022-10-28 RX ADMIN — FERROUS SULFATE TAB 325 MG (65 MG ELEMENTAL FE) 1 EACH: 325 (65 FE) TAB at 10:10

## 2022-10-28 RX ADMIN — SODIUM CHLORIDE, PRESERVATIVE FREE 10 ML: 5 INJECTION INTRAVENOUS at 12:10

## 2022-10-28 RX ADMIN — TRAMADOL HYDROCHLORIDE 50 MG: 50 TABLET, COATED ORAL at 05:10

## 2022-10-28 RX ADMIN — IPRATROPIUM BROMIDE AND ALBUTEROL SULFATE 3 ML: 2.5; .5 SOLUTION RESPIRATORY (INHALATION) at 08:10

## 2022-10-28 RX ADMIN — SODIUM CHLORIDE, PRESERVATIVE FREE 10 ML: 5 INJECTION INTRAVENOUS at 05:10

## 2022-10-28 RX ADMIN — NICOTINE 1 PATCH: 14 PATCH TRANSDERMAL at 10:10

## 2022-10-28 RX ADMIN — APIXABAN 5 MG: 5 TABLET, FILM COATED ORAL at 10:10

## 2022-10-28 RX ADMIN — THERA TABS 1 TABLET: TAB at 10:10

## 2022-10-28 RX ADMIN — PANTOPRAZOLE SODIUM 40 MG: 40 TABLET, DELAYED RELEASE ORAL at 10:10

## 2022-10-28 RX ADMIN — ATORVASTATIN CALCIUM 40 MG: 40 TABLET, FILM COATED ORAL at 10:10

## 2022-10-28 RX ADMIN — ARIPIPRAZOLE 10 MG: 5 TABLET ORAL at 10:10

## 2022-10-28 NOTE — PT/OT/SLP PROGRESS
Physical Therapy      Patient Name:  Nimo Dill   MRN:  [id number]    Patient declined secondary to bathing and waiting for d/c to rehab.

## 2022-10-28 NOTE — PLAN OF CARE
[facility] sent referral and d/c summary to returning home health facility [facility] via [facility]

## 2022-10-28 NOTE — DISCHARGE SUMMARY
[facility]  Hospital Medicine Discharge Summary    Admit Date: 10/17/2022  Discharge Date and Time: 10/28/34743:31 AM  Admitting Physician:  Team  Discharging Physician: Adam Batista MD.  Primary Care Physician: EILEEN Figueroa  Consults: Cardiology, Neurology, and Dietician     Discharge Diagnoses:  Recurrent syncope/collapse  Subacute CVA-bilateral cerebral/cerebellar  Abnormal gait  Right-sided pneumonia-rule out aspiration  Pulmonary cachexia  COPD with acute exacerbation   Acute on chronic hypoxemic/hypercapnic respiratory failure  Essential HTN-now with hypotension  HLD   Chronic anemia  Gastric ulcer  Chronic tobacco use  Hypotension after administration of amlodipine- resolved with discontinuation of amlodipine     Hospital Course:   A 66-year-old female with significant history of recurrent syncope/fall/ataxic gait.  Diagnosed with the cerebellar infarcts likely embolic on aspirin, Eliquis.  Patient awaiting LINQ placement.  She was admitted to our service and was discharged on 10/11 after being diagnosed with CVA.  Patient admitted hospitalist medicine service on 10/17 again with recurrent syncopal/collapse episodes.  During previous hospitalization patient was significantly anemic, she underwent GI evaluation with EGD showing some ulceration at gastrectomy anastomotic site with no active bleeding.  During previous hospitalization therapy services had recommended skilled nursing facility placement, but the patient refused and went home.  Given persistent/recurrent syncope/collapse decided to repeat MRI.  MRI showed evolving CVA-bilateral cerebral/cerebellar.  Neurology consulted given ongoing symptoms.  CT chest, abdomen, pelvis also ordered given significant weight loss and smoking history.  No occult malignancy.  But consistent with pneumonia.  Initiated appropriate antibiotics.  Home anti thrombotics including Eliquis resumed  MRI brain was repeated 10/18-no new  stroke, Confirms evolving bilateral cerebral/cerebellar CVAs  Continue antibiotics for right-sided pneumonia   Patient reported unintentional weight loss, CT chest, abdomen/pelvis with C, no malignancy  We will discontinue steroids continue bronchodilators, she looks very comfortable and there is no wheezing.  Supplemental oxygen to keep saturations more than 90%   Although D-dimer was elevated V/Q scan and bilateral lower extremity Doppler ultrasound is negative.  It was most likely due to inflammatory process.  Continue PPI, Carafate for history of gastric anastomotic ulcer   Patient will need Protonix daily since she is on Eliquis now.  Status post loop recorder implant on 10/21  Patient was accepted to [facility] rehab yesterday per discharge orders had to monitor overnight.  Now she has lost her room.  Referral has been sent to [facility] and patient has been accepted for admission to the facility tomorrow  Speech evaluated and changed her diet.  Goal SPO2 88%   Developed hypotension and dizziness after Amlodipine administration this morning despite her low blood pressure.  Noted patient was on tramadol q.6, Abilify, trazodone at nighttime.  Discontinued amlodipine, change tramadol to Q 8 and discontinue trazodone also  Ordered normal saline 500 cc bolus x2 in intervals  Continue metoprolol for heart rate control  Discussed with neurology they are recommending Eliquis 5 mg b.I.d. at this time due to distribution of the stroke suggesting embolic and suspicion of Afib- cardiac reasons are very high  Cardiology will follow her as outpatient  Discussed with the patient about fall risk  Continue other home meds-aripiprazole, statin, iron, Toprol-XL, multivitamin   Nicotine patch   Leukocytosis persist but no left shift, no fever  Labs stable   Has been accepted to [facility], patient is cleared for discharge    Pt was seen and examined on the day of discharge  Vitals:  VITAL SIGNS: 24 HRS MIN & MAX LAST   Temp  Min: 98.1 °F (36.7  °C)  Max: 98.9 °F (37.2 °C) 98.9 °F (37.2 °C)   BP  Min: 91/52  Max: 129/55 (!) 102/56     Pulse  Min: 86  Max: 108  99   Resp  Min: 16  Max: 76 20   SpO2  Min: 92 %  Max: 97 % 95 %       Physical Exam:  General: In no acute distress, afebrile  Chest:  Few scattered wheezes  Heart: S1, S2, no appreciable murmur, dressing over the loop recorder site  Abdomen: Soft, nontender, BS +  MSK: Warm, no lower extremity edema, no clubbing or cyanosis  Neurologic: Alert and oriented x4,        Procedures Performed: LOOP RECORDER placement    Significant Diagnostic Studies: See Full reports for all details    Recent Labs   Lab 10/23/22  0535 10/25/22  0408 10/27/22  0642   WBC 14.7* 15.3* 15.3*   RBC 3.47* 3.26* 3.11*   HGB 9.4* 8.8* 8.4*   HCT 31.2* 29.1* 28.4*   MCV 89.9 89.3 91.3   MCH 27.1 27.0 27.0   MCHC 30.1* 30.2* 29.6*   RDW 19.3* 19.9* 19.2*    330 267   MPV 10.3 10.8* 10.7*       Recent Labs   Lab 10/23/22  0535 10/25/22  0408 10/27/22  0642    137 135*   K 4.3 4.5 4.5   CO2 27 27 22*   BUN 21.5* 20.9* 17.0   CREATININE 0.79 0.76 0.68   CALCIUM 8.3* 8.3* 8.1*   MG 1.40*  --   --    ALBUMIN 1.9* 2.0* 1.8*   ALKPHOS 93 93 121   ALT 24 15 16   AST 44* 16 23   BILITOT 0.2 0.3 0.4        Microbiology Results (last 7 days)       ** No results found for the last 168 hours. **             X-Ray Chest 1 View  Narrative: EXAMINATION:  XR CHEST 1 VIEW    CLINICAL HISTORY:  dyspnea;    TECHNIQUE:  Frontal view(s) of the chest.    COMPARISON:  Radiography 10/18/2022    FINDINGS:  Bilateral lower lung opacities show mild improvement since prior exam with smaller bilateral pleural effusions.  No new consolidation appreciated.  No pneumothorax.  Cardiac silhouette within normal limits.  Impression: Mild improvement in bilateral lower lung aeration since 10/18/2022.    Electronically signed by: Efren Lino  Date:    10/24/2022  Time:    14:55         Medication List        START taking these medications       albuterol-ipratropium 2.5 mg-0.5 mg/3 mL nebulizer solution  Commonly known as: DUO-NEB  Take 3 mLs by nebulization every 6 (six) hours. Rescue     miconazole NITRATE 2 % 2 % top powder  Commonly known as: MICOTIN  Apply topically 2 (two) times a day.     nicotine 14 mg/24 hr  Commonly known as: NICODERM CQ  Place 1 patch onto the skin once daily.     polyethylene glycol 17 gram Pwpk  Commonly known as: GLYCOLAX  Take 17 g by mouth 2 (two) times daily as needed.     traMADoL 50 mg tablet  Commonly known as: ULTRAM  Take 1 tablet (50 mg total) by mouth every 8 (eight) hours as needed for Pain.     zinc oxide-cod liver oil 40 % Pste paste  Commonly known as: DESITIN  Apply topically 2 (two) times a day.            CONTINUE taking these medications      apixaban 5 mg Tab  Commonly known as: ELIQUIS  Take 1 tablet (5 mg total) by mouth 2 (two) times daily.     ARIPiprazole 10 MG Tab  Commonly known as: ABILIFY     atorvastatin 40 MG tablet  Commonly known as: LIPITOR  Take 1 tablet (40 mg total) by mouth once daily.     cyproheptadine 4 mg tablet  Commonly known as: PERIACTIN     ferrous sulfate 325 (65 FE) MG EC tablet  Take 1 tablet (325 mg total) by mouth once daily.     metoprolol succinate 50 MG 24 hr tablet  Commonly known as: TOPROL-XL  Take 1 tablet (50 mg total) by mouth once daily.     multivitamin Tab  Take 1 tablet by mouth once daily.     pantoprazole 40 MG tablet  Commonly known as: PROTONIX  Take 1 tablet (40 mg total) by mouth 2 (two) times daily.     sucralfate 1 gram tablet  Commonly known as: CARAFATE  Take 1 tablet (1 g total) by mouth 4 (four) times daily.            STOP taking these medications      ALPRAZolam 0.5 MG tablet  Commonly known as: XANAX     aspirin 81 MG Chew     HYDROcodone-acetaminophen 5-325 mg per tablet  Commonly known as: NORCO     ketoconazole 2 % cream  Commonly known as: NIZORAL     paroxetine 40 MG tablet  Commonly known as: PAXIL     zolpidem 10 mg Tab  Commonly known  as: AMBIEN               Where to Get Your Medications        Information about where to get these medications is not yet available    Ask your nurse or doctor about these medications  albuterol-ipratropium 2.5 mg-0.5 mg/3 mL nebulizer solution  miconazole NITRATE 2 % 2 % top powder  nicotine 14 mg/24 hr  polyethylene glycol 17 gram Pwpk  traMADoL 50 mg tablet  zinc oxide-cod liver oil 40 % Pste paste          Explained in detail to the patient about the discharge plan, medications, and follow-up visits. Pt understands and agrees with the treatment plan  Discharge Disposition: [facility] for rehab   Discharged Condition: stable  Diet-   Dietary Orders (From admission, onward)       Start     Ordered    10/26/22 1654  Dietary nutrition supplements Boost Plus Chocolate; Daily  Continuous        Question Answer Comment   Select PO Supplement: Boost Plus Chocolate    Frequency: Daily        10/26/22 1654    10/19/22 1332  Dietary nutrition supplements Robert - Fruit Punch; BID  Continuous        Question Answer Comment   Select PO Supplement: Robert - Fruit Punch    Frequency: BID        10/19/22 1332    10/19/22 1041  Diet Adult Regular Cardiac  Diet effective now        Question:  Diet Modifier:  Answer:  Cardiac    10/19/22 1041                   Medications Per DC med rec  Activities as tolerated   Follow-up Information       Archie Lara MD Follow up in 1 week(s).    Specialty: Cardiology  Contact information:  [facility] [facility]  [address]  [place] [address]  [phone]               EILEEN Figueroa. Schedule an appointment as soon as possible for a visit in 2 week(s).    Specialty: Family Medicine  Contact information:  [address] [place] [address] [address]  [place] [address]  [phone]                           For further questions contact hospitalist office    Discharge time 35 minutes    For worsening symptoms, chest pain, shortness of breath, increased abdominal pain, high grade fever, stroke or stroke  like symptoms, immediately go to the nearest Emergency Room or call 911 as soon as possible.      Adam Batista MD  Department of Hospital Medicine   [facility]   10/28/2022 8:31 AM

## 2022-10-28 NOTE — PLAN OF CARE
Problem: Adult Inpatient Plan of Care  Goal: Plan of Care Review  Outcome: Ongoing, Progressing  Flowsheets (Taken 10/27/2022 2332)  Plan of Care Reviewed With: patient  Goal: Patient-Specific Goal (Individualized)  Outcome: Ongoing, Progressing  Goal: Absence of Hospital-Acquired Illness or Injury  Outcome: Ongoing, Progressing  Intervention: Identify and Manage Fall Risk  Flowsheets (Taken 10/27/2022 2332)  Safety Promotion/Fall Prevention: assistive device/personal item within reach  Goal: Optimal Comfort and Wellbeing  Outcome: Ongoing, Progressing  Goal: Readiness for Transition of Care  Outcome: Ongoing, Progressing

## 2022-10-28 NOTE — PLAN OF CARE
JATINDER updated by Baylee with [facility]. Report can be given to charge nurse, Anh ([phone]) and accepting doctor is Dr. Cruz. Jatinder updated pt's nurse, Radha.

## 2022-11-08 ENCOUNTER — OFFICE VISIT (OUTPATIENT)
Dept: NEUROLOGY | Facility: CLINIC | Age: 67
End: 2022-11-08
Payer: MEDICARE

## 2022-11-08 VITALS
SYSTOLIC BLOOD PRESSURE: 96 MMHG | WEIGHT: 109 LBS | HEIGHT: 64 IN | BODY MASS INDEX: 18.61 KG/M2 | DIASTOLIC BLOOD PRESSURE: 69 MMHG

## 2022-11-08 DIAGNOSIS — I63.9 CEREBROVASCULAR ACCIDENT (CVA), UNSPECIFIED MECHANISM: Primary | ICD-10-CM

## 2022-11-08 PROCEDURE — 99999 PR PBB SHADOW E&M-EST. PATIENT-LVL III: ICD-10-PCS | Mod: PBBFAC,,, | Performed by: NURSE PRACTITIONER

## 2022-11-08 PROCEDURE — 99999 PR PBB SHADOW E&M-EST. PATIENT-LVL III: CPT | Mod: PBBFAC,,, | Performed by: NURSE PRACTITIONER

## 2022-11-08 PROCEDURE — 99213 OFFICE O/P EST LOW 20 MIN: CPT | Mod: S$PBB,,, | Performed by: NURSE PRACTITIONER

## 2022-11-08 PROCEDURE — 99213 PR OFFICE/OUTPT VISIT, EST, LEVL III, 20-29 MIN: ICD-10-PCS | Mod: S$PBB,,, | Performed by: NURSE PRACTITIONER

## 2022-11-08 PROCEDURE — 99213 OFFICE O/P EST LOW 20 MIN: CPT | Mod: PBBFAC | Performed by: NURSE PRACTITIONER

## 2022-11-08 NOTE — PROGRESS NOTES
Subjective:       Patient ID: Nimo Dill is a 66 y.o. female.    Chief Complaint:  Stroke (Hosp f/u. Pt denies HA, slurred speech, blurred vision or weakness )      History of Present Illness  Patient presents for hospital follow up of stroke. Patient presented to [facility] on 10/6/22 with reports of generalized weakness and increased falls x 2 weeks. Patient was diagnosed with pneumonia. MRI brain had been performed at outlying facility on 22 showing old cerebellar infarcts and old right centrum semiovale infarct. On 10/6/22 patient had worsening dizziness, increased number of falls, and slurred speech and was brought to [facility] ED.  Initial labs noted elevated troponin and UTI.  CTh showed no acute intracranial abnormality. MRI brain showed several acute pontine and infratentorial supratentorial nonhemorrhagic lacunar infarct. CTA head/neck revealed no evidence for stenosis, occlusion, aneurysm, or thrombus of the intracranial vasculature. Cardioembolic in nature; patient was started on OAC. Echo and CUS normal. Patient had Linq placed 10/21/22. Today, patient denies any new onset of numbness, tingling or weakness. She is in a wheelchair. Is currently participating in inpatient rehab. Reports she will be discharged on Thursday of this week.       Past Medical History:   Diagnosis Date    Common bile duct dilatation     Epigastric abdominal pain     Gastric ulcer, acute with hemorrhage and perforation     PUD (peptic ulcer disease)        Past Surgical History:   Procedure Laterality Date    APPENDECTOMY       SECTION, CLASSIC      CHOLECYSTECTOMY      Distal gastrectomy      ESOPHAGOGASTRODUODENOSCOPY N/A 10/10/2022    Procedure: EGD;  Surgeon: Nati Smith MD;  Location: [facility] ENDOSCOPY;  Service: Gastroenterology;  Laterality: N/A;    GASTRECTOMY      HYSTERECTOMY      INSERTION OF IMPLANTABLE LOOP RECORDER N/A 10/21/2022    Procedure: Insertion, Implantable Loop Recorder;  Surgeon: Chas Gibson  MD;  Location: [facility] CATH LAB;  Service: Cardiology;  Laterality: N/A;    Rectovaginal fistula repair      TONSILLECTOMY      VAGOTOMY AND PYLOROPLASTY         Family History   Problem Relation Age of Onset    Stroke Father     Hypertension Father        Social History     Socioeconomic History    Marital status:    Tobacco Use    Smoking status: Every Day     Packs/day: 1.00     Years: 40.00     Pack years: 40.00     Types: Cigarettes    Smokeless tobacco: Never   Substance and Sexual Activity    Alcohol use: No    Drug use: No    Sexual activity: Yes     Partners: Male       Current Outpatient Medications   Medication Sig Dispense Refill    albuterol-ipratropium (DUO-NEB) 2.5 mg-0.5 mg/3 mL nebulizer solution Take 3 mLs by nebulization every 6 (six) hours. Rescue 75 mL 0    apixaban (ELIQUIS) 5 mg Tab Take 1 tablet (5 mg total) by mouth 2 (two) times daily. 60 tablet 2    ARIPiprazole (ABILIFY) 10 MG Tab Take 10 mg by mouth once daily.      atorvastatin (LIPITOR) 40 MG tablet Take 1 tablet (40 mg total) by mouth once daily. 90 tablet 3    cyproheptadine (PERIACTIN) 4 mg tablet       ferrous sulfate 325 (65 FE) MG EC tablet Take 1 tablet (325 mg total) by mouth once daily. 30 tablet 3    metoprolol succinate (TOPROL-XL) 50 MG 24 hr tablet Take 1 tablet (50 mg total) by mouth once daily. 30 tablet 11    miconazole NITRATE 2 % (MICOTIN) 2 % top powder Apply topically 2 (two) times a day.  0    multivitamin Tab Take 1 tablet by mouth once daily. 30 tablet 2    nicotine (NICODERM CQ) 14 mg/24 hr Place 1 patch onto the skin once daily.  0    pantoprazole (PROTONIX) 40 MG tablet Take 1 tablet (40 mg total) by mouth 2 (two) times daily. 60 tablet 3    polyethylene glycol (GLYCOLAX) 17 gram PwPk Take 17 g by mouth 2 (two) times daily as needed.  0    sucralfate (CARAFATE) 1 gram tablet Take 1 tablet (1 g total) by mouth 4 (four) times daily. 120 tablet 0    traMADoL (ULTRAM) 50 mg tablet Take 1 tablet (50 mg total)  by mouth every 8 (eight) hours as needed for Pain.      zinc oxide-cod liver oil (DESITIN) 40 % Pste paste Apply topically 2 (two) times a day.       No current facility-administered medications for this visit.       Review of patient's allergies indicates:   Allergen Reactions    Penicillins Hives and Blisters        Review of Systems  Review of Systems   Neurological:  Positive for weakness. Negative for dizziness, facial asymmetry, speech difficulty, light-headedness, numbness and headaches.   All other systems reviewed and are negative.    Objective:      Neurologic Exam     Mental Status   Oriented to person, place, and time.   Attention: normal. Concentration: normal.   Speech: speech is normal   Level of consciousness: alert  Knowledge: good.   Normal comprehension.     Cranial Nerves     CN II   Visual fields full to confrontation.     CN III, IV, VI   Pupils are equal, round, and reactive to light.  Extraocular motions are normal.     CN V   Facial sensation intact.     CN VII   Facial expression full, symmetric.     CN VIII   CN VIII normal.     CN XII   CN XII normal.     Motor Exam   Muscle bulk: normal  Right arm tone: normal  Left arm tone: normal  Right arm pronator drift: present  Right leg tone: normal  Left leg tone: normal    Sensory Exam   Light touch normal.     Gait, Coordination, and Reflexes In wheelchair     Physical Exam  Vitals and nursing note reviewed.   Eyes:      Extraocular Movements: EOM normal.      Pupils: Pupils are equal, round, and reactive to light.   Pulmonary:      Effort: Pulmonary effort is normal.   Neurological:      Mental Status: She is oriented to person, place, and time.   Psychiatric:         Speech: Speech normal.         Assessment:        1. Cerebrovascular accident (CVA), unspecified mechanism        Plan:     Continue Eliquis for suspected Afib  Continue statin; goal LDL <70 to be managed by PCP  Encouraged smoking cessation with patient; she is currently on  Nicoderm patch.  Secondary stroke prevention measures discussed. Education provided on signs and symptoms of stroke; advised to call 9-1-1 with any new onset of numbness, tingling or weakness, difficulty with speech or facial droop.    Patient to follow up with Dr. Candelario in 3 months

## 2022-12-13 ENCOUNTER — DOCUMENTATION ONLY (OUTPATIENT)
Dept: INFUSION THERAPY | Facility: HOSPITAL | Age: 67
End: 2022-12-13
Payer: MEDICARE

## 2022-12-13 ENCOUNTER — TELEPHONE (OUTPATIENT)
Dept: NEUROLOGY | Facility: CLINIC | Age: 67
End: 2022-12-13
Payer: MEDICARE

## 2022-12-13 NOTE — TELEPHONE ENCOUNTER
Reports pt has an appt for Prolia tomorrow 12/14/22 at 1 pm.  States original order was sent by Dr. Christen Hernandez, but they have been unable to contact her.  Is asking if CHOCO Aragon can send in an order for this appt.   States seeking a callback so she can notify pt if a r/s is needed.     LOV: 11/8/22    NOV: 2/13/23

## 2022-12-13 NOTE — PROGRESS NOTES
Pt does not have orders for labs or Prolia injection scheduled for 12/14/22; Chrsiten Hernandez NP last saw pt in 11/2021 and is no longer a provider at [facility]; called office of Lisa Sam NP at [phone], which is an Neurology Clinic; JULIO Whitt MA attempted to reach patient to cancel 12/14/22 appt and was unable to contact patient; JULIO Whitt MA will cancel pt's appt for 12/14/22.

## 2023-01-16 PROBLEM — J18.9 CAP (COMMUNITY ACQUIRED PNEUMONIA): Status: RESOLVED | Noted: 2022-10-11 | Resolved: 2023-01-16

## 2023-01-16 PROBLEM — N39.0 UTI (URINARY TRACT INFECTION): Status: RESOLVED | Noted: 2022-10-11 | Resolved: 2023-01-16

## 2023-01-30 PROBLEM — I63.9 STROKE: Status: RESOLVED | Noted: 2022-10-07 | Resolved: 2023-01-30

## (undated) DEVICE — CONTAINER SPECIMEN 4OZ

## (undated) DEVICE — BITE BLOCK ADULT LATEX FREE

## (undated) DEVICE — ADHESIVE DERMABOND ADVANCED

## (undated) DEVICE — CLOSURE SKIN STERI STRIP 1/2X4

## (undated) DEVICE — COLLECTION SPECIMEN NEPTUNE

## (undated) DEVICE — TUBING O2 FEMALE CONN 13FT

## (undated) DEVICE — KIT CANIST SUCTION 1200CC

## (undated) DEVICE — SOL IRRI STRL WATER 1000ML

## (undated) DEVICE — KIT SURGICAL COLON .25 1.1OZ

## (undated) DEVICE — TIP SUCTION YANKAUER